# Patient Record
Sex: FEMALE | Race: WHITE | NOT HISPANIC OR LATINO | Employment: FULL TIME | RURAL
[De-identification: names, ages, dates, MRNs, and addresses within clinical notes are randomized per-mention and may not be internally consistent; named-entity substitution may affect disease eponyms.]

---

## 2016-12-19 LAB — CRC RECOMMENDATION EXT: NORMAL

## 2020-04-06 ENCOUNTER — HISTORICAL (OUTPATIENT)
Dept: ADMINISTRATIVE | Facility: HOSPITAL | Age: 55
End: 2020-04-06

## 2020-07-30 ENCOUNTER — HISTORICAL (OUTPATIENT)
Dept: ADMINISTRATIVE | Facility: HOSPITAL | Age: 55
End: 2020-07-30

## 2021-03-30 ENCOUNTER — HOSPITAL ENCOUNTER (OUTPATIENT)
Dept: RADIOLOGY | Facility: HOSPITAL | Age: 56
Discharge: HOME | End: 2021-03-30
Attending: NURSE PRACTITIONER
Payer: COMMERCIAL

## 2021-03-30 DIAGNOSIS — Q76.7 OTHER CONGENITAL ANOMALY OF RIBS AND STERNUM: ICD-10-CM

## 2021-03-30 DIAGNOSIS — Q76.6 OTHER CONGENITAL ANOMALY OF RIBS AND STERNUM: ICD-10-CM

## 2021-03-30 DIAGNOSIS — R07.81 PLEURITIC CHEST PAIN: ICD-10-CM

## 2021-03-30 PROCEDURE — 25500020 PHARM REV CODE 255: Performed by: NURSE PRACTITIONER

## 2021-03-30 PROCEDURE — 71260 CT CHEST WITH CONTRAST: ICD-10-PCS | Mod: 26,,, | Performed by: RADIOLOGY

## 2021-03-30 PROCEDURE — 71260 CT THORAX DX C+: CPT | Mod: 26,,, | Performed by: RADIOLOGY

## 2021-03-30 PROCEDURE — 71260 CT THORAX DX C+: CPT | Mod: TC

## 2021-03-30 RX ADMIN — IOPAMIDOL 100 ML: 755 INJECTION, SOLUTION INTRAVENOUS at 01:03

## 2021-04-02 ENCOUNTER — TELEPHONE (OUTPATIENT)
Dept: FAMILY MEDICINE | Facility: CLINIC | Age: 56
End: 2021-04-02

## 2021-04-06 ENCOUNTER — TELEPHONE (OUTPATIENT)
Dept: FAMILY MEDICINE | Facility: CLINIC | Age: 56
End: 2021-04-06

## 2021-04-11 ENCOUNTER — OFFICE VISIT (OUTPATIENT)
Dept: FAMILY MEDICINE | Facility: CLINIC | Age: 56
End: 2021-04-11
Payer: COMMERCIAL

## 2021-04-11 VITALS
DIASTOLIC BLOOD PRESSURE: 90 MMHG | HEIGHT: 60 IN | SYSTOLIC BLOOD PRESSURE: 150 MMHG | OXYGEN SATURATION: 97 % | HEART RATE: 88 BPM | BODY MASS INDEX: 29.06 KG/M2 | TEMPERATURE: 99 F | WEIGHT: 148 LBS

## 2021-04-11 DIAGNOSIS — J45.909 ASTHMA, UNSPECIFIED ASTHMA SEVERITY, UNSPECIFIED WHETHER COMPLICATED, UNSPECIFIED WHETHER PERSISTENT: Primary | ICD-10-CM

## 2021-04-11 DIAGNOSIS — R06.2 WHEEZING: ICD-10-CM

## 2021-04-11 DIAGNOSIS — J01.00 ACUTE NON-RECURRENT MAXILLARY SINUSITIS: ICD-10-CM

## 2021-04-11 PROCEDURE — 99214 OFFICE O/P EST MOD 30 MIN: CPT | Mod: ,,, | Performed by: NURSE PRACTITIONER

## 2021-04-11 PROCEDURE — 99051 PR MEDICAL SERVICES, EVE/WKEND/HOLIDAY: ICD-10-PCS | Mod: ,,, | Performed by: NURSE PRACTITIONER

## 2021-04-11 PROCEDURE — 99214 PR OFFICE/OUTPT VISIT, EST, LEVL IV, 30-39 MIN: ICD-10-PCS | Mod: ,,, | Performed by: NURSE PRACTITIONER

## 2021-04-11 PROCEDURE — 99051 MED SERV EVE/WKEND/HOLIDAY: CPT | Mod: ,,, | Performed by: NURSE PRACTITIONER

## 2021-04-11 RX ORDER — LEVOTHYROXINE SODIUM 75 UG/1
75 TABLET ORAL DAILY
COMMUNITY
Start: 2021-04-05 | End: 2021-04-21 | Stop reason: SDUPTHER

## 2021-04-11 RX ORDER — AZITHROMYCIN 250 MG/1
TABLET, FILM COATED ORAL
Qty: 6 TABLET | Refills: 0 | Status: SHIPPED | OUTPATIENT
Start: 2021-04-11 | End: 2021-04-21 | Stop reason: ALTCHOICE

## 2021-04-11 RX ORDER — ALBUTEROL SULFATE 90 UG/1
2 AEROSOL, METERED RESPIRATORY (INHALATION) EVERY 6 HOURS PRN
Qty: 18 G | Refills: 1 | Status: SHIPPED | OUTPATIENT
Start: 2021-04-11 | End: 2021-04-21 | Stop reason: SDUPTHER

## 2021-04-11 RX ORDER — MONTELUKAST SODIUM 10 MG/1
10 TABLET ORAL NIGHTLY
COMMUNITY
End: 2021-04-21 | Stop reason: SDUPTHER

## 2021-04-11 RX ORDER — ERGOCALCIFEROL 1.25 MG/1
50000 CAPSULE ORAL
COMMUNITY
Start: 2021-03-06 | End: 2021-04-21 | Stop reason: SDUPTHER

## 2021-04-11 RX ORDER — HYDROCODONE BITARTRATE AND ACETAMINOPHEN 7.5; 325 MG/1; MG/1
TABLET ORAL
COMMUNITY
Start: 2021-04-07 | End: 2024-03-04

## 2021-04-11 RX ORDER — ALBUTEROL SULFATE 90 UG/1
180 INHALANT RESPIRATORY (INHALATION)
COMMUNITY
End: 2021-04-11

## 2021-04-11 RX ORDER — PREDNISONE 20 MG/1
40 TABLET ORAL DAILY
Qty: 10 TABLET | Refills: 0 | Status: SHIPPED | OUTPATIENT
Start: 2021-04-11 | End: 2021-04-16

## 2021-04-21 ENCOUNTER — OFFICE VISIT (OUTPATIENT)
Dept: FAMILY MEDICINE | Facility: CLINIC | Age: 56
End: 2021-04-21
Payer: COMMERCIAL

## 2021-04-21 VITALS
DIASTOLIC BLOOD PRESSURE: 64 MMHG | BODY MASS INDEX: 28.32 KG/M2 | WEIGHT: 150 LBS | HEIGHT: 61 IN | SYSTOLIC BLOOD PRESSURE: 112 MMHG | HEART RATE: 76 BPM | OXYGEN SATURATION: 99 % | TEMPERATURE: 98 F

## 2021-04-21 DIAGNOSIS — M81.0 OSTEOPOROSIS, UNSPECIFIED OSTEOPOROSIS TYPE, UNSPECIFIED PATHOLOGICAL FRACTURE PRESENCE: ICD-10-CM

## 2021-04-21 DIAGNOSIS — R06.2 WHEEZING: ICD-10-CM

## 2021-04-21 DIAGNOSIS — E03.9 HYPOTHYROIDISM, UNSPECIFIED TYPE: Primary | ICD-10-CM

## 2021-04-21 DIAGNOSIS — E55.9 VITAMIN D DEFICIENCY: ICD-10-CM

## 2021-04-21 DIAGNOSIS — J45.909 ASTHMA, UNSPECIFIED ASTHMA SEVERITY, UNSPECIFIED WHETHER COMPLICATED, UNSPECIFIED WHETHER PERSISTENT: ICD-10-CM

## 2021-04-21 DIAGNOSIS — M19.90 ARTHRITIS: ICD-10-CM

## 2021-04-21 LAB
25(OH)D3 SERPL-MCNC: 77 NG/ML
ALBUMIN SERPL BCP-MCNC: 4 G/DL (ref 3.5–5)
ALBUMIN/GLOB SERPL: 1.4 {RATIO}
ALP SERPL-CCNC: 69 U/L (ref 46–118)
ALT SERPL W P-5'-P-CCNC: 21 U/L (ref 13–56)
ANION GAP SERPL CALCULATED.3IONS-SCNC: 8 MMOL/L (ref 7–16)
AST SERPL W P-5'-P-CCNC: 13 U/L (ref 15–37)
BILIRUB SERPL-MCNC: 0.4 MG/DL (ref 0–1.2)
BUN SERPL-MCNC: 9 MG/DL (ref 7–18)
BUN/CREAT SERPL: 16 (ref 6–20)
CALCIUM SERPL-MCNC: 8.7 MG/DL (ref 8.5–10.1)
CHLORIDE SERPL-SCNC: 110 MMOL/L (ref 98–107)
CO2 SERPL-SCNC: 30 MMOL/L (ref 21–32)
CREAT SERPL-MCNC: 0.58 MG/DL (ref 0.55–1.02)
GLOBULIN SER-MCNC: 2.9 G/DL (ref 2–4)
GLUCOSE SERPL-MCNC: 77 MG/DL (ref 74–106)
POTASSIUM SERPL-SCNC: 4 MMOL/L (ref 3.5–5.1)
PROT SERPL-MCNC: 6.9 G/DL (ref 6.4–8.2)
SODIUM SERPL-SCNC: 144 MMOL/L (ref 136–145)
TSH SERPL DL<=0.005 MIU/L-ACNC: 0.69 UIU/ML (ref 0.36–3.74)

## 2021-04-21 PROCEDURE — 80053 COMPREHENSIVE METABOLIC PANEL: ICD-10-PCS | Mod: QW,,, | Performed by: CLINICAL MEDICAL LABORATORY

## 2021-04-21 PROCEDURE — 99214 OFFICE O/P EST MOD 30 MIN: CPT | Mod: ,,, | Performed by: NURSE PRACTITIONER

## 2021-04-21 PROCEDURE — 84443 ASSAY THYROID STIM HORMONE: CPT | Mod: QW,,, | Performed by: CLINICAL MEDICAL LABORATORY

## 2021-04-21 PROCEDURE — 84443 TSH: ICD-10-PCS | Mod: QW,,, | Performed by: CLINICAL MEDICAL LABORATORY

## 2021-04-21 PROCEDURE — 82306 VITAMIN D: ICD-10-PCS | Mod: ,,, | Performed by: CLINICAL MEDICAL LABORATORY

## 2021-04-21 PROCEDURE — 99214 PR OFFICE/OUTPT VISIT, EST, LEVL IV, 30-39 MIN: ICD-10-PCS | Mod: ,,, | Performed by: NURSE PRACTITIONER

## 2021-04-21 PROCEDURE — 80053 COMPREHEN METABOLIC PANEL: CPT | Mod: QW,,, | Performed by: CLINICAL MEDICAL LABORATORY

## 2021-04-21 PROCEDURE — 82306 VITAMIN D 25 HYDROXY: CPT | Mod: ,,, | Performed by: CLINICAL MEDICAL LABORATORY

## 2021-04-21 RX ORDER — MELOXICAM 7.5 MG/1
7.5 TABLET ORAL
COMMUNITY
End: 2022-09-14 | Stop reason: ALTCHOICE

## 2021-04-21 RX ORDER — ERGOCALCIFEROL 1.25 MG/1
50000 CAPSULE ORAL
Qty: 8 CAPSULE | Refills: 5 | Status: SHIPPED | OUTPATIENT
Start: 2021-04-22 | End: 2023-10-02

## 2021-04-21 RX ORDER — MONTELUKAST SODIUM 10 MG/1
10 TABLET ORAL NIGHTLY
Qty: 30 TABLET | Refills: 5 | Status: SHIPPED | OUTPATIENT
Start: 2021-04-21 | End: 2022-09-14 | Stop reason: ALTCHOICE

## 2021-04-21 RX ORDER — ALBUTEROL SULFATE 90 UG/1
2 AEROSOL, METERED RESPIRATORY (INHALATION) EVERY 6 HOURS PRN
Qty: 18 G | Refills: 5 | Status: SHIPPED | OUTPATIENT
Start: 2021-04-21 | End: 2024-03-25 | Stop reason: SDUPTHER

## 2021-04-21 RX ORDER — IBANDRONATE SODIUM 150 MG/1
150 TABLET, FILM COATED ORAL
Qty: 1 TABLET | Refills: 5 | Status: SHIPPED | OUTPATIENT
Start: 2021-04-21 | End: 2022-09-14 | Stop reason: ALTCHOICE

## 2021-04-21 RX ORDER — LEVOTHYROXINE SODIUM 75 UG/1
75 TABLET ORAL DAILY
Qty: 30 TABLET | Refills: 5 | Status: SHIPPED | OUTPATIENT
Start: 2021-04-21 | End: 2021-11-15 | Stop reason: SDUPTHER

## 2021-04-26 ENCOUNTER — TELEPHONE (OUTPATIENT)
Dept: FAMILY MEDICINE | Facility: CLINIC | Age: 56
End: 2021-04-26

## 2021-04-27 ENCOUNTER — TELEPHONE (OUTPATIENT)
Dept: FAMILY MEDICINE | Facility: CLINIC | Age: 56
End: 2021-04-27

## 2021-04-27 DIAGNOSIS — M81.0 OSTEOPOROSIS, UNSPECIFIED OSTEOPOROSIS TYPE, UNSPECIFIED PATHOLOGICAL FRACTURE PRESENCE: ICD-10-CM

## 2021-04-27 DIAGNOSIS — E55.9 VITAMIN D DEFICIENCY: ICD-10-CM

## 2021-04-29 RX ORDER — ERGOCALCIFEROL 1.25 MG/1
50000 CAPSULE ORAL
Qty: 8 CAPSULE | Refills: 5 | OUTPATIENT
Start: 2021-04-29

## 2021-06-01 ENCOUNTER — OFFICE VISIT (OUTPATIENT)
Dept: OBSTETRICS AND GYNECOLOGY | Facility: CLINIC | Age: 56
End: 2021-06-01
Payer: COMMERCIAL

## 2021-06-01 VITALS
WEIGHT: 150.81 LBS | BODY MASS INDEX: 28.47 KG/M2 | DIASTOLIC BLOOD PRESSURE: 42 MMHG | SYSTOLIC BLOOD PRESSURE: 85 MMHG | HEIGHT: 61 IN

## 2021-06-01 DIAGNOSIS — Z01.419 WOMEN'S ANNUAL ROUTINE GYNECOLOGICAL EXAMINATION: Primary | ICD-10-CM

## 2021-06-01 DIAGNOSIS — N95.1 SYMPTOMS, SUCH AS FLUSHING, SLEEPLESSNESS, HEADACHE, LACK OF CONCENTRATION, ASSOCIATED WITH THE MENOPAUSE: ICD-10-CM

## 2021-06-01 DIAGNOSIS — Z12.72 SPECIAL SCREENING FOR MALIGNANT NEOPLASMS, VAGINA: ICD-10-CM

## 2021-06-01 PROCEDURE — 88142 CYTOPATH C/V THIN LAYER: CPT | Mod: GCY | Performed by: OBSTETRICS & GYNECOLOGY

## 2021-06-01 PROCEDURE — 3008F PR BODY MASS INDEX (BMI) DOCUMENTED: ICD-10-PCS | Mod: ,,, | Performed by: OBSTETRICS & GYNECOLOGY

## 2021-06-01 PROCEDURE — 99213 OFFICE O/P EST LOW 20 MIN: CPT | Mod: PBBFAC | Performed by: OBSTETRICS & GYNECOLOGY

## 2021-06-01 PROCEDURE — 3008F BODY MASS INDEX DOCD: CPT | Mod: ,,, | Performed by: OBSTETRICS & GYNECOLOGY

## 2021-06-01 PROCEDURE — 99386 PREV VISIT NEW AGE 40-64: CPT | Mod: S$PBB,,, | Performed by: OBSTETRICS & GYNECOLOGY

## 2021-06-01 PROCEDURE — 99386 PR PREVENTIVE VISIT,NEW,40-64: ICD-10-PCS | Mod: S$PBB,,, | Performed by: OBSTETRICS & GYNECOLOGY

## 2021-06-03 LAB
GH SERPL-MCNC: NORMAL NG/ML
INSULIN SERPL-ACNC: NORMAL U[IU]/ML
LAB AP CLINICAL INFORMATION: NORMAL
LAB AP GYN INTERPRETATION: NEGATIVE
LAB AP PAP DISCLAIMER COMMENTS: NORMAL
RENIN PLAS-CCNC: NORMAL NG/ML/H

## 2021-06-17 ENCOUNTER — OFFICE VISIT (OUTPATIENT)
Dept: FAMILY MEDICINE | Facility: CLINIC | Age: 56
End: 2021-06-17
Payer: COMMERCIAL

## 2021-06-17 VITALS
TEMPERATURE: 99 F | DIASTOLIC BLOOD PRESSURE: 68 MMHG | HEART RATE: 98 BPM | SYSTOLIC BLOOD PRESSURE: 120 MMHG | HEIGHT: 61 IN | WEIGHT: 143 LBS | OXYGEN SATURATION: 98 % | BODY MASS INDEX: 27 KG/M2

## 2021-06-17 DIAGNOSIS — R07.0 PAIN IN THROAT: Primary | ICD-10-CM

## 2021-06-17 DIAGNOSIS — J00 ACUTE NASOPHARYNGITIS (COMMON COLD): ICD-10-CM

## 2021-06-17 LAB
CTP QC/QA: YES
S PYO RRNA THROAT QL PROBE: NEGATIVE

## 2021-06-17 PROCEDURE — 3008F BODY MASS INDEX DOCD: CPT | Mod: ,,, | Performed by: FAMILY MEDICINE

## 2021-06-17 PROCEDURE — 99213 PR OFFICE/OUTPT VISIT, EST, LEVL III, 20-29 MIN: ICD-10-PCS | Mod: ,,, | Performed by: FAMILY MEDICINE

## 2021-06-17 PROCEDURE — 3008F PR BODY MASS INDEX (BMI) DOCUMENTED: ICD-10-PCS | Mod: ,,, | Performed by: FAMILY MEDICINE

## 2021-06-17 PROCEDURE — 99213 OFFICE O/P EST LOW 20 MIN: CPT | Mod: ,,, | Performed by: FAMILY MEDICINE

## 2021-06-17 PROCEDURE — 87880 STREP A ASSAY W/OPTIC: CPT | Mod: QW,,, | Performed by: FAMILY MEDICINE

## 2021-06-17 PROCEDURE — 87880 POCT RAPID STREP A: ICD-10-PCS | Mod: QW,,, | Performed by: FAMILY MEDICINE

## 2021-06-17 RX ORDER — ESTRADIOL 1 MG/1
1 TABLET ORAL DAILY
COMMUNITY
Start: 2021-06-01 | End: 2022-09-14 | Stop reason: ALTCHOICE

## 2021-06-17 RX ORDER — PREDNISONE 20 MG/1
20 TABLET ORAL DAILY
Qty: 5 TABLET | Refills: 0 | Status: SHIPPED | OUTPATIENT
Start: 2021-06-17 | End: 2022-09-14 | Stop reason: ALTCHOICE

## 2021-06-17 RX ORDER — CETIRIZINE HYDROCHLORIDE AND PSEUDOEPHEDRINE HYDROCHLORIDE 5; 120 MG/1; MG/1
1 TABLET, FILM COATED, EXTENDED RELEASE ORAL 2 TIMES DAILY PRN
COMMUNITY
Start: 2020-12-27 | End: 2022-09-14 | Stop reason: ALTCHOICE

## 2021-06-17 RX ORDER — IPRATROPIUM BROMIDE 21 UG/1
2 SPRAY, METERED NASAL 3 TIMES DAILY
Qty: 30 ML | Refills: 0 | Status: SHIPPED | OUTPATIENT
Start: 2021-06-17 | End: 2022-12-27

## 2021-06-17 RX ORDER — TRAMADOL HYDROCHLORIDE 50 MG/1
50 TABLET ORAL NIGHTLY
COMMUNITY
Start: 2021-06-08

## 2021-06-17 RX ORDER — ONDANSETRON 4 MG/1
4 TABLET, FILM COATED ORAL EVERY 6 HOURS PRN
Qty: 30 TABLET | Refills: 0 | Status: SHIPPED | OUTPATIENT
Start: 2021-06-17 | End: 2022-06-16 | Stop reason: SDUPTHER

## 2021-06-17 RX ORDER — SUMATRIPTAN SUCCINATE 25 MG/1
TABLET ORAL
COMMUNITY
Start: 2020-08-17 | End: 2022-09-14 | Stop reason: ALTCHOICE

## 2021-06-17 RX ORDER — ERGOCALCIFEROL 1.25 MG/1
CAPSULE ORAL
COMMUNITY
Start: 2020-12-14 | End: 2021-11-15 | Stop reason: SDUPTHER

## 2021-06-17 RX ORDER — PSEUDOEPHEDRINE HCL 30 MG
TABLET ORAL
COMMUNITY
Start: 2021-01-22 | End: 2022-09-14 | Stop reason: ALTCHOICE

## 2021-06-21 ENCOUNTER — TELEPHONE (OUTPATIENT)
Dept: FAMILY MEDICINE | Facility: CLINIC | Age: 56
End: 2021-06-21

## 2021-06-21 RX ORDER — AZITHROMYCIN 250 MG/1
TABLET, FILM COATED ORAL
Qty: 6 TABLET | Refills: 0 | Status: SHIPPED | OUTPATIENT
Start: 2021-06-21 | End: 2021-06-26

## 2021-09-10 ENCOUNTER — OFFICE VISIT (OUTPATIENT)
Dept: FAMILY MEDICINE | Facility: CLINIC | Age: 56
End: 2021-09-10
Payer: COMMERCIAL

## 2021-09-10 VITALS
DIASTOLIC BLOOD PRESSURE: 76 MMHG | OXYGEN SATURATION: 98 % | HEART RATE: 83 BPM | TEMPERATURE: 98 F | WEIGHT: 149.5 LBS | HEIGHT: 61 IN | SYSTOLIC BLOOD PRESSURE: 124 MMHG | BODY MASS INDEX: 28.22 KG/M2

## 2021-09-10 DIAGNOSIS — G47.09 OTHER INSOMNIA: Primary | ICD-10-CM

## 2021-09-10 DIAGNOSIS — M79.672 LEFT FOOT PAIN: ICD-10-CM

## 2021-09-10 PROCEDURE — 1160F PR REVIEW ALL MEDS BY PRESCRIBER/CLIN PHARMACIST DOCUMENTED: ICD-10-PCS | Mod: ,,, | Performed by: FAMILY MEDICINE

## 2021-09-10 PROCEDURE — 3008F PR BODY MASS INDEX (BMI) DOCUMENTED: ICD-10-PCS | Mod: ,,, | Performed by: FAMILY MEDICINE

## 2021-09-10 PROCEDURE — 3044F PR MOST RECENT HEMOGLOBIN A1C LEVEL <7.0%: ICD-10-PCS | Mod: ,,, | Performed by: FAMILY MEDICINE

## 2021-09-10 PROCEDURE — 3044F HG A1C LEVEL LT 7.0%: CPT | Mod: ,,, | Performed by: FAMILY MEDICINE

## 2021-09-10 PROCEDURE — 1159F MED LIST DOCD IN RCRD: CPT | Mod: ,,, | Performed by: FAMILY MEDICINE

## 2021-09-10 PROCEDURE — 99214 PR OFFICE/OUTPT VISIT, EST, LEVL IV, 30-39 MIN: ICD-10-PCS | Mod: ,,, | Performed by: FAMILY MEDICINE

## 2021-09-10 PROCEDURE — 3078F DIAST BP <80 MM HG: CPT | Mod: ,,, | Performed by: FAMILY MEDICINE

## 2021-09-10 PROCEDURE — 1159F PR MEDICATION LIST DOCUMENTED IN MEDICAL RECORD: ICD-10-PCS | Mod: ,,, | Performed by: FAMILY MEDICINE

## 2021-09-10 PROCEDURE — 99214 OFFICE O/P EST MOD 30 MIN: CPT | Mod: ,,, | Performed by: FAMILY MEDICINE

## 2021-09-10 PROCEDURE — 3074F PR MOST RECENT SYSTOLIC BLOOD PRESSURE < 130 MM HG: ICD-10-PCS | Mod: ,,, | Performed by: FAMILY MEDICINE

## 2021-09-10 PROCEDURE — 3008F BODY MASS INDEX DOCD: CPT | Mod: ,,, | Performed by: FAMILY MEDICINE

## 2021-09-10 PROCEDURE — 1160F RVW MEDS BY RX/DR IN RCRD: CPT | Mod: ,,, | Performed by: FAMILY MEDICINE

## 2021-09-10 PROCEDURE — 3078F PR MOST RECENT DIASTOLIC BLOOD PRESSURE < 80 MM HG: ICD-10-PCS | Mod: ,,, | Performed by: FAMILY MEDICINE

## 2021-09-10 PROCEDURE — 3074F SYST BP LT 130 MM HG: CPT | Mod: ,,, | Performed by: FAMILY MEDICINE

## 2021-09-11 RX ORDER — TRAZODONE HYDROCHLORIDE 50 MG/1
TABLET ORAL
Qty: 60 TABLET | Refills: 2 | Status: SHIPPED | OUTPATIENT
Start: 2021-09-11 | End: 2022-09-14 | Stop reason: ALTCHOICE

## 2021-09-13 ENCOUNTER — TELEPHONE (OUTPATIENT)
Dept: FAMILY MEDICINE | Facility: CLINIC | Age: 56
End: 2021-09-13

## 2021-10-19 ENCOUNTER — PATIENT OUTREACH (OUTPATIENT)
Dept: FAMILY MEDICINE | Facility: CLINIC | Age: 56
End: 2021-10-19

## 2021-11-15 ENCOUNTER — OFFICE VISIT (OUTPATIENT)
Dept: FAMILY MEDICINE | Facility: CLINIC | Age: 56
End: 2021-11-15
Payer: COMMERCIAL

## 2021-11-15 VITALS
BODY MASS INDEX: 27.47 KG/M2 | HEIGHT: 61 IN | SYSTOLIC BLOOD PRESSURE: 120 MMHG | DIASTOLIC BLOOD PRESSURE: 80 MMHG | TEMPERATURE: 98 F | OXYGEN SATURATION: 97 % | HEART RATE: 80 BPM | WEIGHT: 145.5 LBS

## 2021-11-15 DIAGNOSIS — Z01.818 PREOPERATIVE CLEARANCE: Primary | ICD-10-CM

## 2021-11-15 DIAGNOSIS — E03.9 HYPOTHYROIDISM, UNSPECIFIED TYPE: ICD-10-CM

## 2021-11-15 DIAGNOSIS — Z01.810 PREOP CARDIOVASCULAR EXAM: ICD-10-CM

## 2021-11-15 DIAGNOSIS — Z01.812 PRE-OPERATIVE LABORATORY EXAMINATION: ICD-10-CM

## 2021-11-15 LAB
ANION GAP SERPL CALCULATED.3IONS-SCNC: 11 MMOL/L (ref 7–16)
BASOPHILS # BLD AUTO: 0.04 K/UL (ref 0–0.2)
BASOPHILS NFR BLD AUTO: 0.5 % (ref 0–1)
BUN SERPL-MCNC: 10 MG/DL (ref 7–18)
BUN/CREAT SERPL: 15 (ref 6–20)
CALCIUM SERPL-MCNC: 8.7 MG/DL (ref 8.5–10.1)
CHLORIDE SERPL-SCNC: 106 MMOL/L (ref 98–107)
CO2 SERPL-SCNC: 29 MMOL/L (ref 21–32)
CREAT SERPL-MCNC: 0.68 MG/DL (ref 0.55–1.02)
DIFFERENTIAL METHOD BLD: ABNORMAL
EOSINOPHIL # BLD AUTO: 0.28 K/UL (ref 0–0.5)
EOSINOPHIL NFR BLD AUTO: 3.7 % (ref 1–4)
ERYTHROCYTE [DISTWIDTH] IN BLOOD BY AUTOMATED COUNT: 12.8 % (ref 11.5–14.5)
GLUCOSE SERPL-MCNC: 80 MG/DL (ref 74–106)
HCT VFR BLD AUTO: 40.1 % (ref 38–47)
HGB BLD-MCNC: 13.2 G/DL (ref 12–16)
IMM GRANULOCYTES # BLD AUTO: 0.01 K/UL (ref 0–0.04)
IMM GRANULOCYTES NFR BLD: 0.1 % (ref 0–0.4)
LYMPHOCYTES # BLD AUTO: 2.9 K/UL (ref 1–4.8)
LYMPHOCYTES NFR BLD AUTO: 38.5 % (ref 27–41)
MCH RBC QN AUTO: 28.3 PG (ref 27–31)
MCHC RBC AUTO-ENTMCNC: 32.9 G/DL (ref 32–36)
MCV RBC AUTO: 86.1 FL (ref 80–96)
MONOCYTES # BLD AUTO: 0.62 K/UL (ref 0–0.8)
MONOCYTES NFR BLD AUTO: 8.2 % (ref 2–6)
MPC BLD CALC-MCNC: 10.6 FL (ref 9.4–12.4)
NEUTROPHILS # BLD AUTO: 3.69 K/UL (ref 1.8–7.7)
NEUTROPHILS NFR BLD AUTO: 49 % (ref 53–65)
NRBC # BLD AUTO: 0 X10E3/UL
NRBC, AUTO (.00): 0 %
PLATELET # BLD AUTO: 267 K/UL (ref 150–400)
POTASSIUM SERPL-SCNC: 4 MMOL/L (ref 3.5–5.1)
RBC # BLD AUTO: 4.66 M/UL (ref 4.2–5.4)
SODIUM SERPL-SCNC: 142 MMOL/L (ref 136–145)
TSH SERPL DL<=0.005 MIU/L-ACNC: 2.05 UIU/ML (ref 0.36–3.74)
WBC # BLD AUTO: 7.54 K/UL (ref 4.5–11)

## 2021-11-15 PROCEDURE — 3074F SYST BP LT 130 MM HG: CPT | Mod: ,,, | Performed by: FAMILY MEDICINE

## 2021-11-15 PROCEDURE — 3008F PR BODY MASS INDEX (BMI) DOCUMENTED: ICD-10-PCS | Mod: ,,, | Performed by: FAMILY MEDICINE

## 2021-11-15 PROCEDURE — 1159F MED LIST DOCD IN RCRD: CPT | Mod: ,,, | Performed by: FAMILY MEDICINE

## 2021-11-15 PROCEDURE — 3079F DIAST BP 80-89 MM HG: CPT | Mod: ,,, | Performed by: FAMILY MEDICINE

## 2021-11-15 PROCEDURE — 80048 BASIC METABOLIC PANEL: ICD-10-PCS | Mod: ,,, | Performed by: CLINICAL MEDICAL LABORATORY

## 2021-11-15 PROCEDURE — 99213 OFFICE O/P EST LOW 20 MIN: CPT | Mod: ,,, | Performed by: FAMILY MEDICINE

## 2021-11-15 PROCEDURE — 1159F PR MEDICATION LIST DOCUMENTED IN MEDICAL RECORD: ICD-10-PCS | Mod: ,,, | Performed by: FAMILY MEDICINE

## 2021-11-15 PROCEDURE — 84443 TSH: ICD-10-PCS | Mod: ,,, | Performed by: CLINICAL MEDICAL LABORATORY

## 2021-11-15 PROCEDURE — 3074F PR MOST RECENT SYSTOLIC BLOOD PRESSURE < 130 MM HG: ICD-10-PCS | Mod: ,,, | Performed by: FAMILY MEDICINE

## 2021-11-15 PROCEDURE — 3044F HG A1C LEVEL LT 7.0%: CPT | Mod: ,,, | Performed by: FAMILY MEDICINE

## 2021-11-15 PROCEDURE — 99213 PR OFFICE/OUTPT VISIT, EST, LEVL III, 20-29 MIN: ICD-10-PCS | Mod: ,,, | Performed by: FAMILY MEDICINE

## 2021-11-15 PROCEDURE — 1160F RVW MEDS BY RX/DR IN RCRD: CPT | Mod: ,,, | Performed by: FAMILY MEDICINE

## 2021-11-15 PROCEDURE — 93000 PR ELECTROCARDIOGRAM, COMPLETE: ICD-10-PCS | Mod: ,,, | Performed by: FAMILY MEDICINE

## 2021-11-15 PROCEDURE — 3008F BODY MASS INDEX DOCD: CPT | Mod: ,,, | Performed by: FAMILY MEDICINE

## 2021-11-15 PROCEDURE — 84443 ASSAY THYROID STIM HORMONE: CPT | Mod: ,,, | Performed by: CLINICAL MEDICAL LABORATORY

## 2021-11-15 PROCEDURE — 3079F PR MOST RECENT DIASTOLIC BLOOD PRESSURE 80-89 MM HG: ICD-10-PCS | Mod: ,,, | Performed by: FAMILY MEDICINE

## 2021-11-15 PROCEDURE — 85025 CBC WITH DIFFERENTIAL: ICD-10-PCS | Mod: ,,, | Performed by: CLINICAL MEDICAL LABORATORY

## 2021-11-15 PROCEDURE — 3044F PR MOST RECENT HEMOGLOBIN A1C LEVEL <7.0%: ICD-10-PCS | Mod: ,,, | Performed by: FAMILY MEDICINE

## 2021-11-15 PROCEDURE — 85025 COMPLETE CBC W/AUTO DIFF WBC: CPT | Mod: ,,, | Performed by: CLINICAL MEDICAL LABORATORY

## 2021-11-15 PROCEDURE — 93000 ELECTROCARDIOGRAM COMPLETE: CPT | Mod: ,,, | Performed by: FAMILY MEDICINE

## 2021-11-15 PROCEDURE — 80048 BASIC METABOLIC PNL TOTAL CA: CPT | Mod: ,,, | Performed by: CLINICAL MEDICAL LABORATORY

## 2021-11-15 PROCEDURE — 1160F PR REVIEW ALL MEDS BY PRESCRIBER/CLIN PHARMACIST DOCUMENTED: ICD-10-PCS | Mod: ,,, | Performed by: FAMILY MEDICINE

## 2021-11-15 RX ORDER — LEVOTHYROXINE SODIUM 75 UG/1
75 TABLET ORAL DAILY
Qty: 30 TABLET | Refills: 5 | Status: SHIPPED | OUTPATIENT
Start: 2021-11-15 | End: 2022-06-16 | Stop reason: SDUPTHER

## 2021-11-15 RX ORDER — ERGOCALCIFEROL 1.25 MG/1
CAPSULE ORAL
Qty: 24 CAPSULE | Refills: 0 | Status: SHIPPED | OUTPATIENT
Start: 2021-11-15 | End: 2022-06-16 | Stop reason: SDUPTHER

## 2021-11-16 ENCOUNTER — TELEPHONE (OUTPATIENT)
Dept: FAMILY MEDICINE | Facility: CLINIC | Age: 56
End: 2021-11-16
Payer: COMMERCIAL

## 2021-12-06 ENCOUNTER — HOSPITAL ENCOUNTER (OUTPATIENT)
Dept: RADIOLOGY | Facility: HOSPITAL | Age: 56
Discharge: HOME | End: 2021-12-06
Attending: ORTHOPAEDIC SURGERY
Payer: COMMERCIAL

## 2021-12-06 DIAGNOSIS — M79.642 LEFT HAND PAIN: ICD-10-CM

## 2021-12-06 PROBLEM — M19.042 PRIMARY OSTEOARTHRITIS OF LEFT HAND: Status: ACTIVE | Noted: 2021-12-06

## 2021-12-06 PROCEDURE — 73130 X-RAY EXAM OF HAND: CPT | Mod: TC,LT

## 2021-12-20 ENCOUNTER — OFFICE VISIT (OUTPATIENT)
Dept: FAMILY MEDICINE | Facility: CLINIC | Age: 56
End: 2021-12-20
Payer: COMMERCIAL

## 2021-12-20 VITALS
SYSTOLIC BLOOD PRESSURE: 115 MMHG | OXYGEN SATURATION: 98 % | WEIGHT: 146 LBS | DIASTOLIC BLOOD PRESSURE: 55 MMHG | HEART RATE: 77 BPM | RESPIRATION RATE: 17 BRPM | BODY MASS INDEX: 27.56 KG/M2 | TEMPERATURE: 98 F | HEIGHT: 61 IN

## 2021-12-20 DIAGNOSIS — B02.9 HERPES ZOSTER WITHOUT COMPLICATION: Primary | ICD-10-CM

## 2021-12-20 PROBLEM — B02.8 HERPES ZOSTER WITH COMPLICATION: Status: ACTIVE | Noted: 2021-12-20

## 2021-12-20 PROCEDURE — 99213 PR OFFICE/OUTPT VISIT, EST, LEVL III, 20-29 MIN: ICD-10-PCS | Mod: ,,, | Performed by: NURSE PRACTITIONER

## 2021-12-20 PROCEDURE — 99213 OFFICE O/P EST LOW 20 MIN: CPT | Mod: ,,, | Performed by: NURSE PRACTITIONER

## 2021-12-20 RX ORDER — VALACYCLOVIR HYDROCHLORIDE 1 G/1
1000 TABLET, FILM COATED ORAL 3 TIMES DAILY
Qty: 21 TABLET | Refills: 0 | Status: SHIPPED | OUTPATIENT
Start: 2021-12-20 | End: 2022-09-14 | Stop reason: ALTCHOICE

## 2022-05-15 ENCOUNTER — OFFICE VISIT (OUTPATIENT)
Dept: FAMILY MEDICINE | Facility: CLINIC | Age: 57
End: 2022-05-15
Payer: COMMERCIAL

## 2022-05-15 VITALS
HEIGHT: 61 IN | HEART RATE: 87 BPM | TEMPERATURE: 99 F | OXYGEN SATURATION: 98 % | SYSTOLIC BLOOD PRESSURE: 119 MMHG | DIASTOLIC BLOOD PRESSURE: 82 MMHG | BODY MASS INDEX: 27.94 KG/M2 | WEIGHT: 148 LBS

## 2022-05-15 DIAGNOSIS — J32.9 SINUSITIS, UNSPECIFIED CHRONICITY, UNSPECIFIED LOCATION: Primary | ICD-10-CM

## 2022-05-15 DIAGNOSIS — J02.9 SORE THROAT: ICD-10-CM

## 2022-05-15 DIAGNOSIS — R05.9 COUGH: ICD-10-CM

## 2022-05-15 LAB
CTP QC/QA: YES
CTP QC/QA: YES
FLUAV AG NPH QL: NEGATIVE
FLUBV AG NPH QL: NEGATIVE
S PYO RRNA THROAT QL PROBE: NEGATIVE
SARS-COV-2 AG RESP QL IA.RAPID: NEGATIVE

## 2022-05-15 PROCEDURE — 87880 POCT RAPID STREP A: ICD-10-PCS | Mod: QW,,, | Performed by: FAMILY MEDICINE

## 2022-05-15 PROCEDURE — 1160F PR REVIEW ALL MEDS BY PRESCRIBER/CLIN PHARMACIST DOCUMENTED: ICD-10-PCS | Mod: ,,, | Performed by: FAMILY MEDICINE

## 2022-05-15 PROCEDURE — 3074F SYST BP LT 130 MM HG: CPT | Mod: ,,, | Performed by: FAMILY MEDICINE

## 2022-05-15 PROCEDURE — 96372 PR INJECTION,THERAP/PROPH/DIAG2ST, IM OR SUBCUT: ICD-10-PCS | Mod: ,,, | Performed by: FAMILY MEDICINE

## 2022-05-15 PROCEDURE — 3008F PR BODY MASS INDEX (BMI) DOCUMENTED: ICD-10-PCS | Mod: ,,, | Performed by: FAMILY MEDICINE

## 2022-05-15 PROCEDURE — 99214 OFFICE O/P EST MOD 30 MIN: CPT | Mod: 25,,, | Performed by: FAMILY MEDICINE

## 2022-05-15 PROCEDURE — 87428 POCT SARS-COV2 (COVID) WITH FLU ANTIGEN: ICD-10-PCS | Mod: QW,,, | Performed by: FAMILY MEDICINE

## 2022-05-15 PROCEDURE — 3079F DIAST BP 80-89 MM HG: CPT | Mod: ,,, | Performed by: FAMILY MEDICINE

## 2022-05-15 PROCEDURE — 87428 SARSCOV & INF VIR A&B AG IA: CPT | Mod: QW,,, | Performed by: FAMILY MEDICINE

## 2022-05-15 PROCEDURE — 99051 MED SERV EVE/WKEND/HOLIDAY: CPT | Mod: ,,, | Performed by: FAMILY MEDICINE

## 2022-05-15 PROCEDURE — 1160F RVW MEDS BY RX/DR IN RCRD: CPT | Mod: ,,, | Performed by: FAMILY MEDICINE

## 2022-05-15 PROCEDURE — 3074F PR MOST RECENT SYSTOLIC BLOOD PRESSURE < 130 MM HG: ICD-10-PCS | Mod: ,,, | Performed by: FAMILY MEDICINE

## 2022-05-15 PROCEDURE — 99051 PR MEDICAL SERVICES, EVE/WKEND/HOLIDAY: ICD-10-PCS | Mod: ,,, | Performed by: FAMILY MEDICINE

## 2022-05-15 PROCEDURE — 96372 THER/PROPH/DIAG INJ SC/IM: CPT | Mod: ,,, | Performed by: FAMILY MEDICINE

## 2022-05-15 PROCEDURE — 3008F BODY MASS INDEX DOCD: CPT | Mod: ,,, | Performed by: FAMILY MEDICINE

## 2022-05-15 PROCEDURE — 99214 PR OFFICE/OUTPT VISIT, EST, LEVL IV, 30-39 MIN: ICD-10-PCS | Mod: 25,,, | Performed by: FAMILY MEDICINE

## 2022-05-15 PROCEDURE — 1159F PR MEDICATION LIST DOCUMENTED IN MEDICAL RECORD: ICD-10-PCS | Mod: ,,, | Performed by: FAMILY MEDICINE

## 2022-05-15 PROCEDURE — 87880 STREP A ASSAY W/OPTIC: CPT | Mod: QW,,, | Performed by: FAMILY MEDICINE

## 2022-05-15 PROCEDURE — 3079F PR MOST RECENT DIASTOLIC BLOOD PRESSURE 80-89 MM HG: ICD-10-PCS | Mod: ,,, | Performed by: FAMILY MEDICINE

## 2022-05-15 PROCEDURE — 1159F MED LIST DOCD IN RCRD: CPT | Mod: ,,, | Performed by: FAMILY MEDICINE

## 2022-05-15 RX ORDER — AZITHROMYCIN 250 MG/1
TABLET, FILM COATED ORAL
Qty: 6 TABLET | Refills: 0 | Status: SHIPPED | OUTPATIENT
Start: 2022-05-15 | End: 2022-09-14 | Stop reason: ALTCHOICE

## 2022-05-15 RX ORDER — CEFTRIAXONE 1 G/1
1 INJECTION, POWDER, FOR SOLUTION INTRAMUSCULAR; INTRAVENOUS
Status: COMPLETED | OUTPATIENT
Start: 2022-05-15 | End: 2022-05-15

## 2022-05-15 RX ORDER — CYCLOSPORINE 0.5 MG/ML
EMULSION OPHTHALMIC
COMMUNITY
Start: 2022-03-28 | End: 2023-10-02

## 2022-05-15 RX ORDER — DEXAMETHASONE SODIUM PHOSPHATE 4 MG/ML
4 INJECTION, SOLUTION INTRA-ARTICULAR; INTRALESIONAL; INTRAMUSCULAR; INTRAVENOUS; SOFT TISSUE
Status: COMPLETED | OUTPATIENT
Start: 2022-05-15 | End: 2022-05-15

## 2022-05-15 RX ORDER — PREDNISONE 20 MG/1
20 TABLET ORAL DAILY
Qty: 5 TABLET | Refills: 0 | Status: SHIPPED | OUTPATIENT
Start: 2022-05-15 | End: 2022-05-20

## 2022-05-15 RX ORDER — PREDNISOLONE ACETATE 10 MG/ML
SUSPENSION/ DROPS OPHTHALMIC
COMMUNITY
Start: 2021-12-07 | End: 2023-10-02

## 2022-05-15 RX ORDER — PANTOPRAZOLE SODIUM 40 MG/1
40 TABLET, DELAYED RELEASE ORAL DAILY PRN
COMMUNITY
Start: 2022-04-27

## 2022-05-15 RX ADMIN — DEXAMETHASONE SODIUM PHOSPHATE 4 MG: 4 INJECTION, SOLUTION INTRA-ARTICULAR; INTRALESIONAL; INTRAMUSCULAR; INTRAVENOUS; SOFT TISSUE at 02:05

## 2022-05-15 RX ADMIN — CEFTRIAXONE 1 G: 1 INJECTION, POWDER, FOR SOLUTION INTRAMUSCULAR; INTRAVENOUS at 02:05

## 2022-05-15 NOTE — PROGRESS NOTES
Subjective:       Patient ID: Muna Newberry is a 56 y.o. female.    Chief Complaint: Cough (X 3 days), Sinus Problem (Congestion and drainage X 3 days), and Sore Throat (X 3 days)    Pt with cough, congestion, drainage, fatigue sinus pressure x 3 days.     Review of Systems   Constitutional: Positive for fatigue. Negative for activity change, appetite change, chills, diaphoresis, fever and unexpected weight change.   HENT: Positive for nasal congestion, postnasal drip, rhinorrhea, sinus pressure/congestion and sore throat. Negative for dental problem, drooling, ear discharge, ear pain, facial swelling, hearing loss, mouth sores, nosebleeds, sneezing, tinnitus, trouble swallowing, voice change and goiter.    Eyes: Negative for photophobia, discharge, itching and visual disturbance.   Respiratory: Positive for cough. Negative for apnea, choking, chest tightness, shortness of breath, wheezing and stridor.    Cardiovascular: Negative for chest pain, palpitations, leg swelling and claudication.   Gastrointestinal: Negative for abdominal distention, abdominal pain, anal bleeding, blood in stool, change in bowel habit, constipation, diarrhea, nausea, vomiting and change in bowel habit.   Endocrine: Negative for cold intolerance, heat intolerance, polydipsia, polyphagia and polyuria.   Genitourinary: Negative for bladder incontinence, decreased urine volume, difficulty urinating, dysuria, enuresis, flank pain, frequency, hematuria, nocturia, pelvic pain and urgency.   Musculoskeletal: Negative for arthralgias, back pain, gait problem, joint swelling, leg pain, myalgias, neck pain, neck stiffness and joint deformity.   Integumentary:  Negative for pallor, rash, wound, breast mass and breast tenderness.   Allergic/Immunologic: Negative for environmental allergies, food allergies and immunocompromised state.   Neurological: Negative for dizziness, vertigo, tremors, seizures, syncope, facial asymmetry, speech difficulty,  weakness, light-headedness, numbness, headaches, disturbances in coordination, memory loss and coordination difficulties.   Hematological: Negative for adenopathy. Does not bruise/bleed easily.   Psychiatric/Behavioral: Negative for agitation, behavioral problems, confusion, decreased concentration, dysphoric mood, hallucinations, self-injury, sleep disturbance and suicidal ideas. The patient is not nervous/anxious and is not hyperactive.    Breast: Negative for mass and tenderness        Objective:      Physical Exam  Vitals reviewed.   Constitutional:       Appearance: Normal appearance.   HENT:      Head: Normocephalic and atraumatic.      Right Ear: Tympanic membrane, ear canal and external ear normal.      Left Ear: Tympanic membrane, ear canal and external ear normal.      Nose: Congestion and rhinorrhea present.      Comments: Bilateral sinus pressure.      Mouth/Throat:      Mouth: Mucous membranes are moist.      Pharynx: Oropharynx is clear. Posterior oropharyngeal erythema present.   Eyes:      Extraocular Movements: Extraocular movements intact.      Conjunctiva/sclera: Conjunctivae normal.      Pupils: Pupils are equal, round, and reactive to light.   Cardiovascular:      Rate and Rhythm: Normal rate and regular rhythm.      Pulses: Normal pulses.      Heart sounds: Normal heart sounds.   Pulmonary:      Effort: Pulmonary effort is normal.      Breath sounds: Normal breath sounds.   Abdominal:      General: Bowel sounds are normal.      Palpations: Abdomen is soft.   Musculoskeletal:         General: Normal range of motion.      Cervical back: Normal range of motion and neck supple.   Skin:     General: Skin is warm and dry.   Neurological:      General: No focal deficit present.      Mental Status: She is alert. Mental status is at baseline.   Psychiatric:         Mood and Affect: Mood normal.         Behavior: Behavior normal.         Thought Content: Thought content normal.         Judgment: Judgment  normal.         Assessment:       1. Sinusitis, unspecified chronicity, unspecified location    2. Sore throat    3. Cough        Plan:     Sinusitis, unspecified chronicity, unspecified location  -     cefTRIAXone injection 1 g  -     dexamethasone injection 4 mg    Sore throat  -     POCT rapid strep A    Cough  -     POCT SARS-COV2 (COVID) with Flu Antigen    Other orders  -     predniSONE (DELTASONE) 20 MG tablet; Take 1 tablet (20 mg total) by mouth once daily. for 5 days  Dispense: 5 tablet; Refill: 0  -     azithromycin (Z-MAR) 250 MG tablet; Take 2 tablets by mouth on day 1; Take 1 tablet by mouth on days 2-5  Dispense: 6 tablet; Refill: 0     Covid, strep and flu negative.

## 2022-05-15 NOTE — LETTER
May 15, 2022      [facility]  [address] [place] [address]  Phone: [phone]  Fax: [phone]       Patient: Muna Newberry   YOB: 1965  Date of Visit: 05/15/2022    To Whom It May Concern:    Ant Newberry  was at [facility] on 05/15/2022. The patient may return to work/school on 05/16/2022 with no restrictions. This letter also pertains to this date  5/13/2022. If you have any questions or concerns, or if I can be of further assistance, please do not hesitate to contact me.    Sincerely,    Nataliya Cornell, CMA

## 2022-06-16 ENCOUNTER — OFFICE VISIT (OUTPATIENT)
Dept: FAMILY MEDICINE | Facility: CLINIC | Age: 57
End: 2022-06-16
Payer: COMMERCIAL

## 2022-06-16 VITALS
TEMPERATURE: 98 F | SYSTOLIC BLOOD PRESSURE: 110 MMHG | WEIGHT: 152 LBS | HEART RATE: 83 BPM | OXYGEN SATURATION: 98 % | BODY MASS INDEX: 28.7 KG/M2 | HEIGHT: 61 IN | DIASTOLIC BLOOD PRESSURE: 60 MMHG

## 2022-06-16 DIAGNOSIS — E03.9 HYPOTHYROIDISM, UNSPECIFIED TYPE: ICD-10-CM

## 2022-06-16 DIAGNOSIS — D50.8 OTHER IRON DEFICIENCY ANEMIA: Primary | ICD-10-CM

## 2022-06-16 DIAGNOSIS — E55.9 VITAMIN D DEFICIENCY: ICD-10-CM

## 2022-06-16 LAB
25(OH)D3 SERPL-MCNC: 42.7 NG/ML
ALBUMIN SERPL BCP-MCNC: 4 G/DL (ref 3.5–5)
ALBUMIN/GLOB SERPL: 1.5 {RATIO}
ALP SERPL-CCNC: 97 U/L (ref 46–118)
ALT SERPL W P-5'-P-CCNC: 27 U/L (ref 13–56)
ANION GAP SERPL CALCULATED.3IONS-SCNC: 11 MMOL/L (ref 7–16)
AST SERPL W P-5'-P-CCNC: 16 U/L (ref 15–37)
BASOPHILS # BLD AUTO: 0.03 K/UL (ref 0–0.2)
BASOPHILS NFR BLD AUTO: 0.5 % (ref 0–1)
BILIRUB SERPL-MCNC: 0.4 MG/DL (ref 0–1.2)
BUN SERPL-MCNC: 9 MG/DL (ref 7–18)
BUN/CREAT SERPL: 13 (ref 6–20)
CALCIUM SERPL-MCNC: 8.9 MG/DL (ref 8.5–10.1)
CHLORIDE SERPL-SCNC: 106 MMOL/L (ref 98–107)
CO2 SERPL-SCNC: 28 MMOL/L (ref 21–32)
CREAT SERPL-MCNC: 0.68 MG/DL (ref 0.55–1.02)
DIFFERENTIAL METHOD BLD: ABNORMAL
EOSINOPHIL # BLD AUTO: 0.25 K/UL (ref 0–0.5)
EOSINOPHIL NFR BLD AUTO: 3.8 % (ref 1–4)
ERYTHROCYTE [DISTWIDTH] IN BLOOD BY AUTOMATED COUNT: 12.7 % (ref 11.5–14.5)
FERRITIN SERPL-MCNC: 483 NG/ML (ref 8–252)
GLOBULIN SER-MCNC: 2.7 G/DL (ref 2–4)
GLUCOSE SERPL-MCNC: 79 MG/DL (ref 74–106)
HCT VFR BLD AUTO: 42.5 % (ref 38–47)
HGB BLD-MCNC: 14.1 G/DL (ref 12–16)
IMM GRANULOCYTES # BLD AUTO: 0.01 K/UL (ref 0–0.04)
IMM GRANULOCYTES NFR BLD: 0.2 % (ref 0–0.4)
IRON SATN MFR SERPL: 44 % (ref 14–50)
IRON SERPL-MCNC: 92 ΜG/DL (ref 50–170)
LYMPHOCYTES # BLD AUTO: 2.4 K/UL (ref 1–4.8)
LYMPHOCYTES NFR BLD AUTO: 36.4 % (ref 27–41)
MCH RBC QN AUTO: 29.9 PG (ref 27–31)
MCHC RBC AUTO-ENTMCNC: 33.2 G/DL (ref 32–36)
MCV RBC AUTO: 90 FL (ref 80–96)
MONOCYTES # BLD AUTO: 0.5 K/UL (ref 0–0.8)
MONOCYTES NFR BLD AUTO: 7.6 % (ref 2–6)
MPC BLD CALC-MCNC: 10.5 FL (ref 9.4–12.4)
NEUTROPHILS # BLD AUTO: 3.4 K/UL (ref 1.8–7.7)
NEUTROPHILS NFR BLD AUTO: 51.5 % (ref 53–65)
NRBC # BLD AUTO: 0 X10E3/UL
NRBC, AUTO (.00): 0 %
PLATELET # BLD AUTO: 277 K/UL (ref 150–400)
POTASSIUM SERPL-SCNC: 4.2 MMOL/L (ref 3.5–5.1)
PROT SERPL-MCNC: 6.7 G/DL (ref 6.4–8.2)
RBC # BLD AUTO: 4.72 M/UL (ref 4.2–5.4)
SODIUM SERPL-SCNC: 141 MMOL/L (ref 136–145)
TIBC SERPL-MCNC: 207 ΜG/DL (ref 250–450)
TSH SERPL DL<=0.005 MIU/L-ACNC: 1.64 UIU/ML (ref 0.36–3.74)
WBC # BLD AUTO: 6.59 K/UL (ref 4.5–11)

## 2022-06-16 PROCEDURE — 82306 VITAMIN D: ICD-10-PCS | Mod: ICN,,, | Performed by: CLINICAL MEDICAL LABORATORY

## 2022-06-16 PROCEDURE — 99396 PR PREVENTIVE VISIT,EST,40-64: ICD-10-PCS | Mod: ,,, | Performed by: FAMILY MEDICINE

## 2022-06-16 PROCEDURE — 83550 IRON AND TIBC: ICD-10-PCS | Mod: ICN,,, | Performed by: CLINICAL MEDICAL LABORATORY

## 2022-06-16 PROCEDURE — 3008F PR BODY MASS INDEX (BMI) DOCUMENTED: ICD-10-PCS | Mod: ,,, | Performed by: FAMILY MEDICINE

## 2022-06-16 PROCEDURE — 1160F RVW MEDS BY RX/DR IN RCRD: CPT | Mod: ,,, | Performed by: FAMILY MEDICINE

## 2022-06-16 PROCEDURE — 83550 IRON BINDING TEST: CPT | Mod: ICN,,, | Performed by: CLINICAL MEDICAL LABORATORY

## 2022-06-16 PROCEDURE — 1159F MED LIST DOCD IN RCRD: CPT | Mod: ,,, | Performed by: FAMILY MEDICINE

## 2022-06-16 PROCEDURE — 3074F PR MOST RECENT SYSTOLIC BLOOD PRESSURE < 130 MM HG: ICD-10-PCS | Mod: ,,, | Performed by: FAMILY MEDICINE

## 2022-06-16 PROCEDURE — 83540 IRON AND TIBC: ICD-10-PCS | Mod: ICN,,, | Performed by: CLINICAL MEDICAL LABORATORY

## 2022-06-16 PROCEDURE — 1159F PR MEDICATION LIST DOCUMENTED IN MEDICAL RECORD: ICD-10-PCS | Mod: ,,, | Performed by: FAMILY MEDICINE

## 2022-06-16 PROCEDURE — 99396 PREV VISIT EST AGE 40-64: CPT | Mod: ,,, | Performed by: FAMILY MEDICINE

## 2022-06-16 PROCEDURE — 1160F PR REVIEW ALL MEDS BY PRESCRIBER/CLIN PHARMACIST DOCUMENTED: ICD-10-PCS | Mod: ,,, | Performed by: FAMILY MEDICINE

## 2022-06-16 PROCEDURE — 80050 GENERAL HEALTH PANEL: CPT | Mod: ICN,,, | Performed by: CLINICAL MEDICAL LABORATORY

## 2022-06-16 PROCEDURE — 3008F BODY MASS INDEX DOCD: CPT | Mod: ,,, | Performed by: FAMILY MEDICINE

## 2022-06-16 PROCEDURE — 3078F PR MOST RECENT DIASTOLIC BLOOD PRESSURE < 80 MM HG: ICD-10-PCS | Mod: ,,, | Performed by: FAMILY MEDICINE

## 2022-06-16 PROCEDURE — 3074F SYST BP LT 130 MM HG: CPT | Mod: ,,, | Performed by: FAMILY MEDICINE

## 2022-06-16 PROCEDURE — 80050 PR  GENERAL HEALTH PANEL: ICD-10-PCS | Mod: ICN,,, | Performed by: CLINICAL MEDICAL LABORATORY

## 2022-06-16 PROCEDURE — 82306 VITAMIN D 25 HYDROXY: CPT | Mod: ICN,,, | Performed by: CLINICAL MEDICAL LABORATORY

## 2022-06-16 PROCEDURE — 82728 ASSAY OF FERRITIN: CPT | Mod: ICN,,, | Performed by: CLINICAL MEDICAL LABORATORY

## 2022-06-16 PROCEDURE — 82728 FERRITIN: ICD-10-PCS | Mod: ICN,,, | Performed by: CLINICAL MEDICAL LABORATORY

## 2022-06-16 PROCEDURE — 3078F DIAST BP <80 MM HG: CPT | Mod: ,,, | Performed by: FAMILY MEDICINE

## 2022-06-16 PROCEDURE — 83540 ASSAY OF IRON: CPT | Mod: ICN,,, | Performed by: CLINICAL MEDICAL LABORATORY

## 2022-06-16 RX ORDER — ERGOCALCIFEROL 1.25 MG/1
CAPSULE ORAL
Qty: 24 CAPSULE | Refills: 0 | Status: SHIPPED | OUTPATIENT
Start: 2022-06-16 | End: 2022-12-27 | Stop reason: SDUPTHER

## 2022-06-16 RX ORDER — ONDANSETRON 4 MG/1
4 TABLET, FILM COATED ORAL EVERY 6 HOURS PRN
Qty: 30 TABLET | Refills: 0 | Status: SHIPPED | OUTPATIENT
Start: 2022-06-16 | End: 2022-09-14 | Stop reason: ALTCHOICE

## 2022-06-16 RX ORDER — LEVOTHYROXINE SODIUM 75 UG/1
75 TABLET ORAL DAILY
Qty: 30 TABLET | Refills: 5 | Status: SHIPPED | OUTPATIENT
Start: 2022-06-16 | End: 2023-03-20 | Stop reason: SDUPTHER

## 2022-06-17 NOTE — PROGRESS NOTES
[facility]    Chief Complaint    Follow up      History of Present Illness      Muna Newberry is a 56 y.o. female with chronic conditions of asthma, hypothyroidism, migraines, iron def anemia and osteoarthritis who presents today for routine follow up. Pt states she has been seeing Dr. King (heme/onc) for iron def anemia. Was felt her chronic leg pain/myalgia were related to low ferritin. Tried PO iron supplement but had too many GI side effects. Underwent iron infusion in April. Leg cramps improved for a month or two, but she's noted return of symptoms over the last few weeks. States other chronic conditions stable. Tolerating medications w/o adverse side effects.    Past Medical History:  Past Medical History:   Diagnosis Date    Idiopathic thrombotic thrombocytopenic purpura     Osteoarthrosis     Osteoporosis        Past Surgical History:   has a past surgical history that includes Tonsillectomy; Cholecystectomy; Hysterectomy; Appendectomy; and Oophorectomy.    Social History:  Social History     Tobacco Use    Smoking status: Never Smoker    Smokeless tobacco: Never Used   Substance Use Topics    Alcohol use: Never    Drug use: Never       I personally reviewed all past medical, surgical, and social.     Review of Systems   Constitutional: Negative for fatigue and fever.   HENT: Negative for ear pain.    Eyes: Negative for pain and visual disturbance.   Respiratory: Negative for chest tightness and shortness of breath.    Cardiovascular: Negative for chest pain and leg swelling.   Gastrointestinal: Negative for abdominal pain.   Genitourinary: Negative for difficulty urinating.   Musculoskeletal: Positive for myalgias. Negative for gait problem.   Skin: Negative for rash.   Neurological: Negative for dizziness and light-headedness.   Hematological: Does not bruise/bleed easily.        Medications:  Outpatient Encounter Medications as of 6/16/2022   Medication Sig Dispense Refill     albuterol (VENTOLIN HFA) 90 mcg/actuation inhaler Inhale 2 puffs into the lungs every 6 (six) hours as needed for Wheezing. Rescue 18 g 5    azithromycin (Z-MAR) 250 MG tablet Take 2 tablets by mouth on day 1; Take 1 tablet by mouth on days 2-5 6 tablet 0    ergocalciferol (ERGOCALCIFEROL) 50,000 unit Cap Take 1 capsule (50,000 Units total) by mouth twice a week. 8 capsule 5    ergocalciferol (VITAMIN D2) 50,000 unit Cap Vitamin D (Ergocalciferol) 1.25 MG (52892 UT) Oral Capsule QTY: 24 capsule Days: 90 Refills: 1  Written: 20 Patient Instructions: Vitamin D2 1,250 mcg (50,000 unit) oral capsule Dispense: 24 - Take 1 capsule twice weekly. capsules 24 capsule 0    estradioL (ESTRACE) 1 MG tablet Take 1 mg by mouth once daily.      HYDROcodone-acetaminophen (NORCO) 7.5-325 mg per tablet TAKE 1 TABLET BY MOUTH EVERY 12 TO 24 HOURS.      ibandronate (BONIVA) 150 mg tablet Take 1 tablet (150 mg total) by mouth every 30 days. 1 tablet 5    ipratropium (ATROVENT) 21 mcg (0.03 %) nasal spray 2 sprays by Nasal route 3 (three) times daily. 30 mL 0    meloxicam (MOBIC) 7.5 MG tablet Take 7.5 mg by mouth every Monday and Thursday.      montelukast (SINGULAIR) 10 mg tablet Take 1 tablet (10 mg total) by mouth every evening. 30 tablet 5    ondansetron (ZOFRAN) 4 MG tablet Take 1 tablet (4 mg total) by mouth every 6 (six) hours as needed for Nausea. 30 tablet 0    pantoprazole (PROTONIX) 40 MG tablet Take 40 mg by mouth once daily.      prednisoLONE acetate (PRED FORTE) 1 % DrpS       predniSONE (DELTASONE) 20 MG tablet Take 1 tablet (20 mg total) by mouth once daily. (Patient not taking: No sig reported) 5 tablet 0    pseudoephedrine (SUDAFED) 30 MG tablet Sudafed 30 MG Oral Tablet QTY: 30 tablet Days: 30 Refills: 0  Written: 21 Patient Instructions: as directed--Take one tablet daily as needed      RESTASIS 0.05 % ophthalmic emulsion SMARTSI Drop(s) In Eye(s) Every 12 Hours      sumatriptan  (IMITREX) 25 MG Tab Imitrex 25 MG Oral Tablet QTY: 9  Days: 0 Refills: 1  Written: 08/17/20 Patient Instructions: Imitrex 25 mg oral tablet Dispense: 9 - take 1 tablet (25 mg) by oral route once with fluids as early as possible after the onset of a migraine attack;may repeat after 2 hours if headache returns, not to exceed 200mg in 24hrs tablets      SYNTHROID 75 mcg tablet Take 1 tablet (75 mcg total) by mouth once daily. 30 tablet 5    traMADoL (ULTRAM) 50 mg tablet Take 50 mg by mouth 2 (two) times daily as needed.      traZODone (DESYREL) 50 MG tablet One to two tablets at bedtime as needed for insomnia 60 tablet 2    valACYclovir (VALTREX) 1000 MG tablet Take 1 tablet (1,000 mg total) by mouth 3 (three) times daily. for 7 days 21 tablet 0    ZYRTEC-D 5-120 mg Tb12 Take 1 tablet by mouth 2 (two) times daily as needed.      [DISCONTINUED] ergocalciferol (VITAMIN D2) 50,000 unit Cap Vitamin D (Ergocalciferol) 1.25 MG (14371 UT) Oral Capsule QTY: 24 capsule Days: 90 Refills: 1  Written: 12/14/20 Patient Instructions: Vitamin D2 1,250 mcg (50,000 unit) oral capsule Dispense: 24 - Take 1 capsule twice weekly. capsules 24 capsule 0    [DISCONTINUED] ondansetron (ZOFRAN) 4 MG tablet Take 1 tablet (4 mg total) by mouth every 6 (six) hours as needed for Nausea. 30 tablet 0    [DISCONTINUED] SYNTHROID 75 mcg tablet Take 1 tablet (75 mcg total) by mouth once daily. 30 tablet 5     No facility-administered encounter medications on file as of 6/16/2022.       Allergies:  Review of patient's allergies indicates:   Allergen Reactions    Phenergan [promethazine]      IV phenergan only    Sulfa (sulfonamide antibiotics) Blisters    Opioids - morphine analogues Rash    Penicillins Rash       Health Maintenance:    There is no immunization history on file for this patient.   Health Maintenance   Topic Date Due    Hepatitis C Screening  Never done    TETANUS VACCINE  Never done    Mammogram  07/30/2021    Lipid  "Panel  06/01/2022    DEXA Scan  12/30/2023        Physical Exam      Vital Signs  Temp: 97.9 °F (36.6 °C)  Temp src: Oral  Pulse: 83  SpO2: 98 %  BP: 110/60  BP Location: Left arm  Patient Position: Sitting  Height and Weight  Height: 5' 1" (154.9 cm)  Weight: 68.9 kg (152 lb)  BSA (Calculated - sq m): 1.72 sq meters  BMI (Calculated): 28.7  Weight in (lb) to have BMI = 25: 132]    Physical Exam  Constitutional:       Appearance: Normal appearance.   HENT:      Head: Normocephalic and atraumatic.   Eyes:      Extraocular Movements: Extraocular movements intact.      Conjunctiva/sclera: Conjunctivae normal.      Pupils: Pupils are equal, round, and reactive to light.   Cardiovascular:      Rate and Rhythm: Normal rate and regular rhythm.      Pulses: Normal pulses.           Radial pulses are 2+ on the right side and 2+ on the left side.      Heart sounds: Normal heart sounds.   Pulmonary:      Effort: Pulmonary effort is normal.      Breath sounds: Normal breath sounds.   Abdominal:      Palpations: Abdomen is soft.      Tenderness: There is no abdominal tenderness.   Musculoskeletal:         General: Normal range of motion.      Right lower leg: No edema.      Left lower leg: No edema.   Skin:     General: Skin is warm and dry.      Findings: No rash.   Neurological:      General: No focal deficit present.      Mental Status: She is alert. Mental status is at baseline.   Psychiatric:         Mood and Affect: Mood normal.          Laboratory:  CBC:  Recent Labs   Lab 06/01/21  1400 11/15/21  1614 06/16/22  1432   WBC 5.02 7.54 6.59   RBC 4.54 4.66 4.72   Hemoglobin 12.7 13.2 14.1   Hematocrit 40.6 40.1 42.5   Platelet Count 249 267 277   MCV 89.4 86.1 90.0   MCH 28.0 28.3 29.9   MCHC 31.3 L 32.9 33.2     CMP:  Recent Labs   Lab 04/21/21  1627 06/01/21  1400 11/15/21  1614 06/16/22  1432   Glucose 77 87   < > 79   Calcium 8.7 8.6   < > 8.9   Albumin 4.0 3.9  --  4.0   Total Protein 6.9 7.1  --  6.7   Sodium 144 141  "  < > 141   Potassium 4.0 3.9   < > 4.2   CO2 30 26   < > 28   Chloride 110 H 108 H   < > 106   BUN 9 9   < > 9   Alk Phos 69 59  --  97   ALT 21 31  --  27   AST 13 L 22  --  16   Bilirubin, Total 0.4 0.3  --  0.4    < > = values in this interval not displayed.     LIPIDS:  Recent Labs   Lab 06/01/21  1400 11/15/21  1614 06/16/22  1432   TSH 0.839 2.050 1.640   HDL Cholesterol 56  --   --    Cholesterol 221 H  --   --    Triglycerides 119  --   --    LDL Calculated 141  --   --    Cholesterol/HDL Ratio (Risk Factor) 3.9  --   --    Non-  --   --      TSH:  Recent Labs   Lab 06/01/21  1400 11/15/21  1614 06/16/22  1432   TSH 0.839 2.050 1.640     A1C:  Recent Labs   Lab 06/01/21  1400   Hemoglobin A1C 5.3       Assessment/Plan     Muna Newberry is a 56 y.o.female with:     1. Hypothyroidism, unspecified type  - SYNTHROID 75 mcg tablet; Take 1 tablet (75 mcg total) by mouth once daily.  Dispense: 30 tablet; Refill: 5  - TSH; Future  - TSH    2. Other iron deficiency anemia  - CBC Auto Differential; Future  - Comprehensive Metabolic Panel; Future  - Iron and TIBC; Future  - Ferritin; Future  - CBC Auto Differential  - Comprehensive Metabolic Panel  - Iron and TIBC  - Ferritin    3. Vitamin D deficiency  - Vitamin D; Future  - Vitamin D       Total time spent face-to-face and non-face-to-face coordinating care for this encounter was: 30 min    Chronic conditions status updated as per HPI.  Other than changes above, cont current medications and maintain follow up with specialists.  Return to clinic in 6 months.    Spike Mathis,   [facility]

## 2022-06-20 ENCOUNTER — TELEPHONE (OUTPATIENT)
Dept: FAMILY MEDICINE | Facility: CLINIC | Age: 57
End: 2022-06-20
Payer: COMMERCIAL

## 2022-06-20 NOTE — TELEPHONE ENCOUNTER
----- Message from Spike Mathis DO sent at 6/17/2022 12:33 PM CDT -----  Ferritin and other iron studies normal. TSH and other labs normal.

## 2022-09-12 ENCOUNTER — HOSPITAL ENCOUNTER (OUTPATIENT)
Dept: RADIOLOGY | Facility: HOSPITAL | Age: 57
Discharge: HOME | End: 2022-09-12
Payer: COMMERCIAL

## 2022-09-12 DIAGNOSIS — Z12.31 VISIT FOR SCREENING MAMMOGRAM: ICD-10-CM

## 2022-09-12 PROCEDURE — 77067 SCR MAMMO BI INCL CAD: CPT | Mod: TC

## 2022-09-14 ENCOUNTER — OFFICE VISIT (OUTPATIENT)
Dept: FAMILY MEDICINE | Facility: CLINIC | Age: 57
End: 2022-09-14
Payer: COMMERCIAL

## 2022-09-14 VITALS
TEMPERATURE: 98 F | HEIGHT: 61 IN | WEIGHT: 150 LBS | OXYGEN SATURATION: 99 % | DIASTOLIC BLOOD PRESSURE: 76 MMHG | HEART RATE: 92 BPM | BODY MASS INDEX: 28.32 KG/M2 | SYSTOLIC BLOOD PRESSURE: 132 MMHG

## 2022-09-14 DIAGNOSIS — G43.111 INTRACTABLE MIGRAINE WITH AURA WITH STATUS MIGRAINOSUS: ICD-10-CM

## 2022-09-14 DIAGNOSIS — G43.111 INTRACTABLE MIGRAINE WITH AURA WITH STATUS MIGRAINOSUS: Primary | ICD-10-CM

## 2022-09-14 PROCEDURE — 1159F PR MEDICATION LIST DOCUMENTED IN MEDICAL RECORD: ICD-10-PCS | Mod: ,,, | Performed by: NURSE PRACTITIONER

## 2022-09-14 PROCEDURE — 1159F MED LIST DOCD IN RCRD: CPT | Mod: ,,, | Performed by: NURSE PRACTITIONER

## 2022-09-14 PROCEDURE — 3075F SYST BP GE 130 - 139MM HG: CPT | Mod: ,,, | Performed by: NURSE PRACTITIONER

## 2022-09-14 PROCEDURE — 99213 OFFICE O/P EST LOW 20 MIN: CPT | Mod: ,,, | Performed by: NURSE PRACTITIONER

## 2022-09-14 PROCEDURE — 1160F PR REVIEW ALL MEDS BY PRESCRIBER/CLIN PHARMACIST DOCUMENTED: ICD-10-PCS | Mod: ,,, | Performed by: NURSE PRACTITIONER

## 2022-09-14 PROCEDURE — 3008F PR BODY MASS INDEX (BMI) DOCUMENTED: ICD-10-PCS | Mod: ,,, | Performed by: NURSE PRACTITIONER

## 2022-09-14 PROCEDURE — 99213 PR OFFICE/OUTPT VISIT, EST, LEVL III, 20-29 MIN: ICD-10-PCS | Mod: ,,, | Performed by: NURSE PRACTITIONER

## 2022-09-14 PROCEDURE — 3078F DIAST BP <80 MM HG: CPT | Mod: ,,, | Performed by: NURSE PRACTITIONER

## 2022-09-14 PROCEDURE — 3075F PR MOST RECENT SYSTOLIC BLOOD PRESS GE 130-139MM HG: ICD-10-PCS | Mod: ,,, | Performed by: NURSE PRACTITIONER

## 2022-09-14 PROCEDURE — 3008F BODY MASS INDEX DOCD: CPT | Mod: ,,, | Performed by: NURSE PRACTITIONER

## 2022-09-14 PROCEDURE — 1160F RVW MEDS BY RX/DR IN RCRD: CPT | Mod: ,,, | Performed by: NURSE PRACTITIONER

## 2022-09-14 PROCEDURE — 3078F PR MOST RECENT DIASTOLIC BLOOD PRESSURE < 80 MM HG: ICD-10-PCS | Mod: ,,, | Performed by: NURSE PRACTITIONER

## 2022-09-14 RX ORDER — PROMETHAZINE HYDROCHLORIDE 12.5 MG/1
12.5 TABLET ORAL EVERY 6 HOURS PRN
Qty: 30 TABLET | Refills: 0 | Status: SHIPPED | OUTPATIENT
Start: 2022-09-14 | End: 2024-03-25

## 2022-09-14 RX ORDER — RIMEGEPANT SULFATE 75 MG/75MG
75 TABLET, ORALLY DISINTEGRATING ORAL DAILY PRN
Qty: 8 TABLET | Refills: 2 | Status: SHIPPED | OUTPATIENT
Start: 2022-09-14 | End: 2022-09-14 | Stop reason: SDUPTHER

## 2022-09-14 RX ORDER — RIMEGEPANT SULFATE 75 MG/75MG
75 TABLET, ORALLY DISINTEGRATING ORAL DAILY PRN
Qty: 30 TABLET | Refills: 0 | Status: SHIPPED | OUTPATIENT
Start: 2022-09-14 | End: 2023-06-02

## 2022-09-14 NOTE — PROGRESS NOTES
[facility] Medicine          Chief Complaint        Chief Complaint   Patient presents with    Referral             History of Present Illness           Muna Newberry is a 57 y.o. female with chronic conditions of migraines who presents today for headache.  Pt has had persistent migraines with aura for years and has had multiple treatments for this including sumatriptan, maxalt, amitriptyline, topamax, fioricet with no improvement.  Pt would like to try Nurtec next to see if this can improve her s/s.  She was following with Dr. Castro but no longer seeing him due to him moving from [place].            Past Medical History:     Past Medical History:   Diagnosis Date    Idiopathic thrombotic thrombocytopenic purpura     Osteoarthrosis     Osteoporosis              Past Surgical History:      has a past surgical history that includes Tonsillectomy; Cholecystectomy; Hysterectomy; Appendectomy; Oophorectomy; and Breast biopsy.          Social History:     Social History     Tobacco Use    Smoking status: Never    Smokeless tobacco: Never   Substance Use Topics    Alcohol use: Never    Drug use: Never             I personally reviewed all past medical, surgical, and social.           Review of Systems   Constitutional: Negative.    HENT: Negative.     Eyes: Negative.    Respiratory: Negative.     Cardiovascular: Negative.    Gastrointestinal: Negative.    Endocrine: Negative.    Genitourinary: Negative.    Musculoskeletal: Negative.    Skin: Negative.    Allergic/Immunologic: Negative.    Neurological:  Positive for headaches.   Psychiatric/Behavioral: Negative.              Medications:     Outpatient Encounter Medications as of 9/14/2022   Medication Sig Dispense Refill    albuterol (VENTOLIN HFA) 90 mcg/actuation inhaler Inhale 2 puffs into the lungs every 6 (six) hours as needed for Wheezing. Rescue 18 g 5    ergocalciferol (ERGOCALCIFEROL) 50,000 unit Cap Take 1 capsule (50,000 Units total) by mouth twice a  week. 8 capsule 5    ergocalciferol (VITAMIN D2) 50,000 unit Cap Vitamin D (Ergocalciferol) 1.25 MG (17707 UT) Oral Capsule QTY: 24 capsule Days: 90 Refills: 1  Written: 20 Patient Instructions: Vitamin D2 1,250 mcg (50,000 unit) oral capsule Dispense: 24 - Take 1 capsule twice weekly. capsules 24 capsule 0    HYDROcodone-acetaminophen (NORCO) 7.5-325 mg per tablet TAKE 1 TABLET BY MOUTH EVERY 12 TO 24 HOURS.      ipratropium (ATROVENT) 21 mcg (0.03 %) nasal spray 2 sprays by Nasal route 3 (three) times daily. 30 mL 0    pantoprazole (PROTONIX) 40 MG tablet Take 40 mg by mouth once daily.      prednisoLONE acetate (PRED FORTE) 1 % DrpS       promethazine (PHENERGAN) 12.5 MG Tab Take 1 tablet (12.5 mg total) by mouth every 6 (six) hours as needed (nausea assoc. with migraines). 30 tablet 0    RESTASIS 0.05 % ophthalmic emulsion SMARTSI Drop(s) In Eye(s) Every 12 Hours      rimegepant (NURTEC) 75 mg odt Take 1 tablet (75 mg total) by mouth daily as needed for Migraine. Place ODT tablet on the tongue; alternatively the ODT tablet may be placed under the tongue 8 tablet 2    SYNTHROID 75 mcg tablet Take 1 tablet (75 mcg total) by mouth once daily. 30 tablet 5    traMADoL (ULTRAM) 50 mg tablet Take 50 mg by mouth 2 (two) times daily as needed.      [DISCONTINUED] azithromycin (Z-MAR) 250 MG tablet Take 2 tablets by mouth on day 1; Take 1 tablet by mouth on days 2-5 6 tablet 0    [DISCONTINUED] estradioL (ESTRACE) 1 MG tablet Take 1 mg by mouth once daily.      [DISCONTINUED] ibandronate (BONIVA) 150 mg tablet Take 1 tablet (150 mg total) by mouth every 30 days. 1 tablet 5    [DISCONTINUED] meloxicam (MOBIC) 7.5 MG tablet Take 7.5 mg by mouth every Monday and Thursday.      [DISCONTINUED] montelukast (SINGULAIR) 10 mg tablet Take 1 tablet (10 mg total) by mouth every evening. 30 tablet 5    [DISCONTINUED] ondansetron (ZOFRAN) 4 MG tablet Take 1 tablet (4 mg total) by mouth every 6 (six) hours as needed for  "Nausea. 30 tablet 0    [DISCONTINUED] predniSONE (DELTASONE) 20 MG tablet Take 1 tablet (20 mg total) by mouth once daily. (Patient not taking: No sig reported) 5 tablet 0    [DISCONTINUED] pseudoephedrine (SUDAFED) 30 MG tablet Sudafed 30 MG Oral Tablet QTY: 30 tablet Days: 30 Refills: 0  Written: 01/22/21 Patient Instructions: as directed--Take one tablet daily as needed      [DISCONTINUED] sumatriptan (IMITREX) 25 MG Tab Imitrex 25 MG Oral Tablet QTY: 9  Days: 0 Refills: 1  Written: 08/17/20 Patient Instructions: Imitrex 25 mg oral tablet Dispense: 9 - take 1 tablet (25 mg) by oral route once with fluids as early as possible after the onset of a migraine attack;may repeat after 2 hours if headache returns, not to exceed 200mg in 24hrs tablets      [DISCONTINUED] traZODone (DESYREL) 50 MG tablet One to two tablets at bedtime as needed for insomnia 60 tablet 2    [DISCONTINUED] valACYclovir (VALTREX) 1000 MG tablet Take 1 tablet (1,000 mg total) by mouth 3 (three) times daily. for 7 days 21 tablet 0    [DISCONTINUED] ZYRTEC-D 5-120 mg Tb12 Take 1 tablet by mouth 2 (two) times daily as needed.       No facility-administered encounter medications on file as of 9/14/2022.             Allergies:     Review of patient's allergies indicates:   Allergen Reactions    Phenergan [promethazine]      IV phenergan only    Sulfa (sulfonamide antibiotics) Blisters    Opioids - morphine analogues Rash    Penicillins Rash             Health Maintenance:       There is no immunization history on file for this patient.      Health Maintenance   Topic Date Due    Hepatitis C Screening  Never done    TETANUS VACCINE  Never done    Lipid Panel  06/01/2022    Mammogram  09/12/2023    DEXA Scan  12/30/2023              Physical Exam           Vital Signs  Temp: 98.3 °F (36.8 °C)  Temp src: Oral  Pulse: 92  SpO2: 99 %  BP: 132/76  BP Location: Right arm  Patient Position: Sitting  Height and Weight  Height: 5' 1" (154.9 cm)  Weight: 68 kg " (150 lb)  BSA (Calculated - sq m): 1.71 sq meters  BMI (Calculated): 28.4  Weight in (lb) to have BMI = 25: 132]          Physical Exam  Vitals and nursing note reviewed.   Constitutional:       General: She is not in acute distress.     Appearance: Normal appearance. She is not ill-appearing.   HENT:      Head: Normocephalic.      Right Ear: External ear normal.      Left Ear: External ear normal.      Nose: Nose normal. No congestion or rhinorrhea.      Mouth/Throat:      Mouth: Mucous membranes are moist.   Eyes:      Pupils: Pupils are equal, round, and reactive to light.   Cardiovascular:      Rate and Rhythm: Normal rate and regular rhythm.      Pulses: Normal pulses.      Heart sounds: Normal heart sounds. No murmur heard.    No friction rub. No gallop.   Pulmonary:      Effort: Pulmonary effort is normal. No respiratory distress.      Breath sounds: Normal breath sounds. No stridor. No wheezing, rhonchi or rales.   Abdominal:      General: There is no distension.      Palpations: Abdomen is soft.   Musculoskeletal:         General: Normal range of motion.      Cervical back: Normal range of motion and neck supple. No rigidity or tenderness.   Skin:     General: Skin is warm and dry.      Coloration: Skin is not jaundiced or pale.   Neurological:      General: No focal deficit present.      Mental Status: She is alert and oriented to person, place, and time. Mental status is at baseline.      Cranial Nerves: No cranial nerve deficit.      Sensory: No sensory deficit.      Motor: No weakness.      Coordination: Coordination normal.      Gait: Gait normal.   Psychiatric:         Mood and Affect: Mood normal.         Behavior: Behavior normal.         Thought Content: Thought content normal.         Judgment: Judgment normal.              Laboratory:     CBC:     Recent Labs   Lab 06/01/21  1400 11/15/21  1614 06/16/22  1432   WBC 5.02 7.54 6.59   RBC 4.54 4.66 4.72   Hemoglobin 12.7 13.2 14.1   Hematocrit 40.6  40.1 42.5   Platelet Count 249 267 277   MCV 89.4 86.1 90.0   MCH 28.0 28.3 29.9   MCHC 31.3 L 32.9 33.2        CMP:     Recent Labs   Lab 04/21/21  1627 06/01/21  1400 11/15/21  1614 06/16/22  1432   Glucose 77 87   < > 79   Calcium 8.7 8.6   < > 8.9   Albumin 4.0 3.9  --  4.0   Total Protein 6.9 7.1  --  6.7   Sodium 144 141   < > 141   Potassium 4.0 3.9   < > 4.2   CO2 30 26   < > 28   Chloride 110 H 108 H   < > 106   BUN 9 9   < > 9   Alk Phos 69 59  --  97   ALT 21 31  --  27   AST 13 L 22  --  16   Bilirubin, Total 0.4 0.3  --  0.4    < > = values in this interval not displayed.        LIPIDS:     Recent Labs   Lab 06/01/21  1400 11/15/21  1614 06/16/22  1432   TSH 0.839 2.050 1.640   HDL Cholesterol 56  --   --    Cholesterol 221 H  --   --    Triglycerides 119  --   --    LDL Calculated 141  --   --    Cholesterol/HDL Ratio (Risk Factor) 3.9  --   --    Non-  --   --         TSH:     Recent Labs   Lab 06/01/21  1400 11/15/21  1614 06/16/22  1432   TSH 0.839 2.050 1.640        A1C:     Recent Labs   Lab 06/01/21  1400   Hemoglobin A1C 5.3             Assessment/Plan          Muna Newberry is a 57 y.o.female with:           1. Intractable migraine with aura with status migrainosus  - rimegepant (NURTEC) 75 mg odt; Take 1 tablet (75 mg total) by mouth daily as needed for Migraine. Place ODT tablet on the tongue; alternatively the ODT tablet may be placed under the tongue  Dispense: 8 tablet; Refill: 2  - promethazine (PHENERGAN) 12.5 MG Tab; Take 1 tablet (12.5 mg total) by mouth every 6 (six) hours as needed (nausea assoc. with migraines).  Dispense: 30 tablet; Refill: 0      Plan:  -try Nurtec due to failure of multiple migraine meds mentioned in HPI; refer to Dr. Garby       Total time spent face-to-face and non-face-to-face coordinating care for this encounter was: 25 min          Chronic conditions status updated as per HPI.  Other than changes above, cont current medications and maintain follow  up with specialists.  Return to clinic PRN.          NORIS PalafoxC     [facility]

## 2022-11-14 ENCOUNTER — OFFICE VISIT (OUTPATIENT)
Dept: NEUROLOGY | Facility: CLINIC | Age: 57
End: 2022-11-14
Payer: COMMERCIAL

## 2022-11-14 VITALS
HEIGHT: 61 IN | SYSTOLIC BLOOD PRESSURE: 120 MMHG | OXYGEN SATURATION: 98 % | HEART RATE: 76 BPM | BODY MASS INDEX: 27.94 KG/M2 | WEIGHT: 148 LBS | DIASTOLIC BLOOD PRESSURE: 76 MMHG

## 2022-11-14 DIAGNOSIS — G43.111 INTRACTABLE MIGRAINE WITH AURA WITH STATUS MIGRAINOSUS: Primary | ICD-10-CM

## 2022-11-14 PROCEDURE — 99204 PR OFFICE/OUTPT VISIT, NEW, LEVL IV, 45-59 MIN: ICD-10-PCS | Mod: S$PBB,,, | Performed by: PSYCHIATRY & NEUROLOGY

## 2022-11-14 PROCEDURE — 99215 OFFICE O/P EST HI 40 MIN: CPT | Mod: PBBFAC | Performed by: PSYCHIATRY & NEUROLOGY

## 2022-11-14 PROCEDURE — 1160F PR REVIEW ALL MEDS BY PRESCRIBER/CLIN PHARMACIST DOCUMENTED: ICD-10-PCS | Mod: ,,, | Performed by: PSYCHIATRY & NEUROLOGY

## 2022-11-14 PROCEDURE — 1159F MED LIST DOCD IN RCRD: CPT | Mod: ,,, | Performed by: PSYCHIATRY & NEUROLOGY

## 2022-11-14 PROCEDURE — 1160F RVW MEDS BY RX/DR IN RCRD: CPT | Mod: ,,, | Performed by: PSYCHIATRY & NEUROLOGY

## 2022-11-14 PROCEDURE — 99204 OFFICE O/P NEW MOD 45 MIN: CPT | Mod: S$PBB,,, | Performed by: PSYCHIATRY & NEUROLOGY

## 2022-11-14 PROCEDURE — 3074F PR MOST RECENT SYSTOLIC BLOOD PRESSURE < 130 MM HG: ICD-10-PCS | Mod: ,,, | Performed by: PSYCHIATRY & NEUROLOGY

## 2022-11-14 PROCEDURE — 3078F DIAST BP <80 MM HG: CPT | Mod: ,,, | Performed by: PSYCHIATRY & NEUROLOGY

## 2022-11-14 PROCEDURE — 3078F PR MOST RECENT DIASTOLIC BLOOD PRESSURE < 80 MM HG: ICD-10-PCS | Mod: ,,, | Performed by: PSYCHIATRY & NEUROLOGY

## 2022-11-14 PROCEDURE — 3008F BODY MASS INDEX DOCD: CPT | Mod: ,,, | Performed by: PSYCHIATRY & NEUROLOGY

## 2022-11-14 PROCEDURE — 1159F PR MEDICATION LIST DOCUMENTED IN MEDICAL RECORD: ICD-10-PCS | Mod: ,,, | Performed by: PSYCHIATRY & NEUROLOGY

## 2022-11-14 PROCEDURE — 3008F PR BODY MASS INDEX (BMI) DOCUMENTED: ICD-10-PCS | Mod: ,,, | Performed by: PSYCHIATRY & NEUROLOGY

## 2022-11-14 PROCEDURE — 3074F SYST BP LT 130 MM HG: CPT | Mod: ,,, | Performed by: PSYCHIATRY & NEUROLOGY

## 2022-11-14 RX ORDER — ATOGEPANT 60 MG/1
60 TABLET ORAL DAILY
Qty: 90 TABLET | Refills: 3 | Status: SHIPPED | OUTPATIENT
Start: 2022-11-14 | End: 2023-06-01

## 2022-11-14 NOTE — PATIENT INSTRUCTIONS
Avoid any triggers or stressors  Healthy lifestyle habits   Cont Pain mgmt eval  Trial Qulipta daily   F/u 3 months

## 2022-11-14 NOTE — PROGRESS NOTES
Subjective:       Patient ID: Muna Newberry is a 57 y.o. female     Chief Complaint:    Chief Complaint   Patient presents with    Migraine        Allergies:  Phenergan [promethazine], Sulfa (sulfonamide antibiotics), Opioids - morphine analogues, and Penicillins    Current Medications:    Outpatient Encounter Medications as of 2022   Medication Sig Dispense Refill    albuterol (VENTOLIN HFA) 90 mcg/actuation inhaler Inhale 2 puffs into the lungs every 6 (six) hours as needed for Wheezing. Rescue 18 g 5    ergocalciferol (ERGOCALCIFEROL) 50,000 unit Cap Take 1 capsule (50,000 Units total) by mouth twice a week. 8 capsule 5    HYDROcodone-acetaminophen (NORCO) 7.5-325 mg per tablet TAKE 1 TABLET BY MOUTH EVERY 12 TO 24 HOURS.      prednisoLONE acetate (PRED FORTE) 1 % DrpS       promethazine (PHENERGAN) 12.5 MG Tab Take 1 tablet (12.5 mg total) by mouth every 6 (six) hours as needed (nausea assoc. with migraines). 30 tablet 0    RESTASIS 0.05 % ophthalmic emulsion SMARTSI Drop(s) In Eye(s) Every 12 Hours      rimegepant (NURTEC) 75 mg odt Take 1 tablet (75 mg total) by mouth daily as needed for Migraine. Place ODT tablet on the tongue; alternatively the ODT tablet may be placed under the tongue 30 tablet 0    SYNTHROID 75 mcg tablet Take 1 tablet (75 mcg total) by mouth once daily. 30 tablet 5    traMADoL (ULTRAM) 50 mg tablet Take 50 mg by mouth 2 (two) times daily as needed.      ergocalciferol (VITAMIN D2) 50,000 unit Cap Vitamin D (Ergocalciferol) 1.25 MG (62429 UT) Oral Capsule QTY: 24 capsule Days: 90 Refills: 1  Written: 20 Patient Instructions: Vitamin D2 1,250 mcg (50,000 unit) oral capsule Dispense: 24 - Take 1 capsule twice weekly. capsules (Patient not taking: Reported on 2022) 24 capsule 0    ipratropium (ATROVENT) 21 mcg (0.03 %) nasal spray 2 sprays by Nasal route 3 (three) times daily. (Patient not taking: Reported on 2022) 30 mL 0    pantoprazole (PROTONIX) 40 MG  "tablet Take 40 mg by mouth once daily.       No facility-administered encounter medications on file as of 11/14/2022.       History of Present Illness  Patient is a 57-year-old white female who presents in clinic after several years of chronic migraine- like headaches that had been refractory previously to multiple over-the-counter meds, migraine abortive agents and migraine prophylactics including triptans, Topamax, Elavil, Fioricet, etc.   Patient recently started a trial of Nurtec p.r.n. for hopeful improvement of migraine headache frequency and intensity- it helped her mod well.  Pt has also failed all 3 neuropathic pain meds for postherpetic neuralgia   Describes HEADACHE origin as posterior occipital in nature and radiates anteriorly over L facial area - stress/ anxiety, poor sleep exacerbate above symptoms   Pt gets Lidocaine injec's  quarterly from Pain mgmt which works well   Spinal stimulator in thoracic spine for chronic spinal pains but no surgery required   Freq roughly weekly to twice weekly       Past Medical History:   Diagnosis Date    Idiopathic thrombotic thrombocytopenic purpura     Osteoarthrosis     Osteoporosis        Past Surgical History:   Procedure Laterality Date    APPENDECTOMY      BREAST BIOPSY      CHOLECYSTECTOMY      HYSTERECTOMY      OOPHORECTOMY      TONSILLECTOMY         Social History  Ms. Newberry  reports that she has never smoked. She has never used smokeless tobacco. She reports that she does not drink alcohol and does not use drugs.    Family History  Ms.'s Newberry family history includes Breast cancer in her paternal aunt; Heart disease in her mother; Leukemia in her father.    Review of Systems  Review of Systems   Neurological:  Positive for headaches.   All other systems reviewed and are negative.   Objective:   /76 (BP Location: Right arm, Patient Position: Sitting, BP Method: Large (Manual))   Pulse 76   Ht 5' 1" (1.549 m)   Wt 67.1 kg (148 lb)   SpO2 98%   BMI " 27.96 kg/m²    NEUROLOGICAL EXAMINATION:     MENTAL STATUS   Oriented to person, place, and time.   Level of consciousness: alert  Knowledge: good.     CRANIAL NERVES   Cranial nerves II through XII intact.     MOTOR EXAM     Strength   Strength 5/5 throughout.     GAIT AND COORDINATION     Gait  Gait: normal     Physical Exam  Vitals reviewed.   Constitutional:       Appearance: She is normal weight.   Neurological:      General: No focal deficit present.      Mental Status: She is alert and oriented to person, place, and time. Mental status is at baseline.      Cranial Nerves: Cranial nerves 2-12 are intact.      Motor: Motor strength is normal.      Gait: Gait is intact.        Assessment:     Intractable migraine with aura with status migrainosus  -     Ambulatory referral/consult to Neurology       Primary Diagnosis and ICD10  Intractable migraine with aura with status migrainosus [G43.111]    Plan:     There are no Patient Instructions on file for this visit.    There are no discontinued medications.    Requested Prescriptions      No prescriptions requested or ordered in this encounter

## 2022-11-22 RX ORDER — GALCANEZUMAB 120 MG/ML
120 INJECTION, SOLUTION SUBCUTANEOUS
Qty: 3 EACH | Refills: 3 | Status: SHIPPED | OUTPATIENT
Start: 2022-11-22 | End: 2023-10-02

## 2022-12-21 NOTE — TELEPHONE ENCOUNTER
"Subjective:     Chief Complaint   Patient presents with    Follow-Up     Physical therapy        HPI:   Jaclyn presents today with     Problem   Tobacco Dependence    Chronic in nature. Stable. Continues smoking a pack of cigarettes daily. still not ready to quit.     Pelvic Floor Dysfunction    Chronic in nature. Significant improvement with pelvic floor PT. States rectal and bladder prolapse. Pain has been much improved.     Stage 3b Chronic Kidney Disease (Hcc)    Continues to follow with Dr. Najjar and is doing well.  Labs 11/10/2022 indicated GFR of 45.  This is a slight increase.  Patient states she is doing well with hydration.     Sciatica of Right Side    Chronic in nature. States the \"overlapping vertebra\" and she has had flares of sciatica pain. States needs to continue current PT prior to trying another PT.     Essential Tremor    Chronic in nature. Worsening recently. States due to increased excitement. States she does not want to take medication. States she is still overall ok currently. States will plan to decrease caffeine.     Bipolar Affective Disorder, Current Episode Depressed (Hcc)    Chronic in nature. Stable. Continues taking remeron 30 mg PO nightly.  Treatment continues to work well without side effects.     Facet arthropathy, lumbar    States significantly more severe recently. States that she has been struggling with this recently. States that it has been difficult to control. Interested in pain management in the new year.      Fibromyalgia    Chronic in nature. Stable overall.  Has had a lot of increased pain but states that she feels she is overall doing well.  Patient does not wish to take narcotic pain medications we discussed this as she states these are not well researched for fibromyalgia and she states she does not feel that they would be a good choice for her discussed that I agree with her. continues taking gabapentin 600 mg PO three times daily and voltaren 4 times daily. " Spoke to patient verbalized results. Patient understood. No questions or concerns voiced at this time.   States pain is well-managed with current regimen. States that she attempts to walk for 30 minutes daily. Reports that her fibromyalgia is sensitive to cold.  States that there has been a lot going on lately putting hopefully closing on her house soon.      Mood Disorder (Hcc) (Resolved)   Abdominal Pain, Luq (Left Upper Quadrant) (Resolved)   Essential Hypertension (Resolved)    Blood pressure today is low at 112/62.  Patient states that it has been running in the 110s at home.  She does continue to have dizziness, unchanged.  Denies any headaches, blurry vision, chest pain or palpitations.  Consider referral to neurology.     Recurrent Major Depressive Disorder (Hcc) (Resolved)       Current Outpatient Medications Ordered in Epic   Medication Sig Dispense Refill    topiramate (TOPAMAX) 50 MG tablet TAKE 1 TABLET BY MOUTH THREE TIMES A  Tablet 0    mirtazapine (REMERON) 30 MG Tab tablet TAKE 1 TABLET BY MOUTH EVERYDAY AT BEDTIME 90 Tablet 0    Milnacipran HCl (SAVELLA) 100 MG Tab Take 1 Tablet by mouth 2 times a day. 180 Tablet 2    baclofen (LIORESAL) 10 MG Tab Take 1 Tablet by mouth 3 times a day. 270 Tablet 3    albuterol (PROAIR HFA) 108 (90 Base) MCG/ACT Aero Soln inhalation aerosol Inhale 2 Puffs every four hours as needed for Shortness of Breath. 8.5 g 0    gabapentin (NEURONTIN) 600 MG tablet TAKE 1 TABLET BY MOUTH THREE TIMES A  Tablet 0    diclofenac sodium (VOLTAREN) 1 % Gel Apply 2 g topically 4 times a day as needed (pain). 100 g 0    topiramate (TOPAMAX) 50 MG tablet Take 1 Tablet by mouth 3 times a day. 270 Tablet 0    gabapentin (NEURONTIN) 600 MG tablet Take 1 Tablet by mouth 3 times a day. 270 Tablet 0    ondansetron (ZOFRAN ODT) 4 MG TABLET DISPERSIBLE Take 1 Tablet by mouth every 8 hours as needed for Nausea. 15 Tablet 0    Omega-3 Fatty Acids (FISH OIL) 1000 MG Cap capsule Take 1,000 mg by mouth 1 time a day as needed.      magnesium oxide (MAG-OX) 400 MG Tab tablet Take 400 mg by  "mouth every day.      clindamycin (CLEOCIN) 2 % vaginal cream Use vaginally after sexual activity for irritation and odor related to BV. 40 g 0    lidocaine (LIDODERM) 5 % Patch Place 1 Patch on the skin every 24 hours. 10 Patch 0    Loratadine (CLARITIN PO) Take 1 tablet by mouth every day.      PROAIR  (90 Base) MCG/ACT Aero Soln inhalation aerosol INHALE 2 PUFFS BY MOUTH EVERY 6 HOURS AS NEEDED FOR SHORTNESS OF BREATH. 8.5 Inhaler 3    hydrOXYzine HCl (ATARAX) 25 MG Tab Take 1 Tab by mouth 3 times a day as needed for Anxiety. 90 Tab 0    dicyclomine (BENTYL) 20 MG Tab Take 20 mg by mouth every 6 hours as needed.       No current Epic-ordered facility-administered medications on file.       Health Maintenance: Discuss hep C screening with next labs    Review of Systems   Constitutional:  Negative for chills, fever and malaise/fatigue.   HENT:  Negative for hearing loss.    Respiratory:  Negative for cough.    Cardiovascular:  Negative for chest pain, palpitations and leg swelling.   Gastrointestinal:  Negative for heartburn.   Musculoskeletal:  Negative for myalgias.   Neurological:  Negative for dizziness.   Psychiatric/Behavioral:  Negative for depression and suicidal ideas.        Objective:     Exam:  /70 (BP Location: Left arm, Patient Position: Sitting, BP Cuff Size: Adult)   Pulse 84   Temp 36.6 °C (97.8 °F) (Temporal)   Ht 1.664 m (5' 5.5\")   Wt 84.1 kg (185 lb 6.4 oz)   LMP 12/01/2000   SpO2 93%   BMI 30.38 kg/m²  Body mass index is 30.38 kg/m².    Physical Exam  Constitutional:       Appearance: Normal appearance.   HENT:      Head: Normocephalic and atraumatic.   Eyes:      Pupils: Pupils are equal, round, and reactive to light.   Cardiovascular:      Rate and Rhythm: Normal rate and regular rhythm.      Pulses: Normal pulses.      Heart sounds: Normal heart sounds.   Pulmonary:      Effort: Pulmonary effort is normal.      Breath sounds: Normal breath sounds.   Skin:     General: " Skin is warm and dry.      Capillary Refill: Capillary refill takes less than 2 seconds.   Neurological:      General: No focal deficit present.      Mental Status: She is alert and oriented to person, place, and time.     Assessment & Plan:     55 y.o. female with the following -     Problem List Items Addressed This Visit       Bipolar affective disorder, current episode depressed (HCC)     -states that Remeron 30 mg         Essential tremor     - will continue conservative management.         Facet arthropathy, lumbar     -continue savella and gabapentin.          Relevant Orders    Referral to Pain Management    Fibromyalgia     -states continue current medications.   -states that she is continuing exercise which is going well. Will continue to monitor.   -Referral to pain management.         Relevant Orders    Referral to Pain Management    Pelvic floor dysfunction     - continue pelvic floor PT         Sciatica of right side     - will re-address at next visit.         Relevant Orders    Referral to Pain Management    Stage 3b chronic kidney disease (HCC)     -continue to Dr. Najjar            Return in about 6 months (around 6/19/2023), or if symptoms worsen or fail to improve, for annual wellness.    I have placed the below orders and discussed them with an approved delegating provider. The MA is performing the below orders under the direction of Dr. Duke.     Please note that this dictation was created using voice recognition software. I have made every reasonable attempt to correct obvious errors, but I expect that there are errors of grammar and possibly content that I did not discover before finalizing the note.

## 2022-12-27 ENCOUNTER — OFFICE VISIT (OUTPATIENT)
Dept: FAMILY MEDICINE | Facility: CLINIC | Age: 57
End: 2022-12-27
Payer: COMMERCIAL

## 2022-12-27 VITALS
TEMPERATURE: 100 F | BODY MASS INDEX: 27.94 KG/M2 | SYSTOLIC BLOOD PRESSURE: 130 MMHG | HEART RATE: 106 BPM | HEIGHT: 61 IN | RESPIRATION RATE: 16 BRPM | DIASTOLIC BLOOD PRESSURE: 70 MMHG | OXYGEN SATURATION: 98 % | WEIGHT: 148 LBS

## 2022-12-27 DIAGNOSIS — U07.1 COVID-19 VIRUS INFECTION: Primary | ICD-10-CM

## 2022-12-27 DIAGNOSIS — R05.9 COUGH, UNSPECIFIED TYPE: ICD-10-CM

## 2022-12-27 PROBLEM — J32.9 CHRONIC SINUSITIS: Status: ACTIVE | Noted: 2021-01-25

## 2022-12-27 PROBLEM — K86.89 PANCREATIC INSUFFICIENCY: Status: ACTIVE | Noted: 2022-12-27

## 2022-12-27 PROBLEM — D50.9 IRON DEFICIENCY ANEMIA: Status: ACTIVE | Noted: 2022-12-27

## 2022-12-27 PROBLEM — G89.29 CHRONIC PAIN: Status: ACTIVE | Noted: 2020-06-13

## 2022-12-27 PROBLEM — G25.81 RESTLESS LEGS SYNDROME: Status: ACTIVE | Noted: 2022-12-27

## 2022-12-27 LAB
CTP QC/QA: YES
FLUAV AG NPH QL: NEGATIVE
FLUBV AG NPH QL: NEGATIVE
SARS-COV-2 AG RESP QL IA.RAPID: POSITIVE

## 2022-12-27 PROCEDURE — 3008F BODY MASS INDEX DOCD: CPT | Mod: ,,, | Performed by: NURSE PRACTITIONER

## 2022-12-27 PROCEDURE — 1160F PR REVIEW ALL MEDS BY PRESCRIBER/CLIN PHARMACIST DOCUMENTED: ICD-10-PCS | Mod: ,,, | Performed by: NURSE PRACTITIONER

## 2022-12-27 PROCEDURE — 87428 POCT SARS-COV2 (COVID) WITH FLU ANTIGEN: ICD-10-PCS | Mod: QW,,, | Performed by: NURSE PRACTITIONER

## 2022-12-27 PROCEDURE — 99214 OFFICE O/P EST MOD 30 MIN: CPT | Mod: ,,, | Performed by: NURSE PRACTITIONER

## 2022-12-27 PROCEDURE — 3078F PR MOST RECENT DIASTOLIC BLOOD PRESSURE < 80 MM HG: ICD-10-PCS | Mod: ,,, | Performed by: NURSE PRACTITIONER

## 2022-12-27 PROCEDURE — 1159F MED LIST DOCD IN RCRD: CPT | Mod: ,,, | Performed by: NURSE PRACTITIONER

## 2022-12-27 PROCEDURE — 1160F RVW MEDS BY RX/DR IN RCRD: CPT | Mod: ,,, | Performed by: NURSE PRACTITIONER

## 2022-12-27 PROCEDURE — 87428 SARSCOV & INF VIR A&B AG IA: CPT | Mod: QW,,, | Performed by: NURSE PRACTITIONER

## 2022-12-27 PROCEDURE — 1159F PR MEDICATION LIST DOCUMENTED IN MEDICAL RECORD: ICD-10-PCS | Mod: ,,, | Performed by: NURSE PRACTITIONER

## 2022-12-27 PROCEDURE — 3078F DIAST BP <80 MM HG: CPT | Mod: ,,, | Performed by: NURSE PRACTITIONER

## 2022-12-27 PROCEDURE — 3075F PR MOST RECENT SYSTOLIC BLOOD PRESS GE 130-139MM HG: ICD-10-PCS | Mod: ,,, | Performed by: NURSE PRACTITIONER

## 2022-12-27 PROCEDURE — 99214 PR OFFICE/OUTPT VISIT, EST, LEVL IV, 30-39 MIN: ICD-10-PCS | Mod: ,,, | Performed by: NURSE PRACTITIONER

## 2022-12-27 PROCEDURE — 3075F SYST BP GE 130 - 139MM HG: CPT | Mod: ,,, | Performed by: NURSE PRACTITIONER

## 2022-12-27 PROCEDURE — 3008F PR BODY MASS INDEX (BMI) DOCUMENTED: ICD-10-PCS | Mod: ,,, | Performed by: NURSE PRACTITIONER

## 2022-12-27 RX ORDER — ONDANSETRON 4 MG/1
4 TABLET, FILM COATED ORAL EVERY 6 HOURS PRN
Qty: 20 TABLET | Refills: 0 | Status: SHIPPED | OUTPATIENT
Start: 2022-12-27 | End: 2024-03-25 | Stop reason: SDUPTHER

## 2022-12-27 NOTE — PROGRESS NOTES
[facility]    Chief Complaint      Chief Complaint   Patient presents with    covid test      @ home test pos. Yesterday     Fatigue    Generalized Body Aches    Headache    Nasal Congestion    Cough    Nausea    Emesis    Fever     History of Present Illness      Muna Newberry is a 57 y.o. female with chronic conditions of ITP and osteoporosis who presents today for c/o URI symptoms.    URI   This is a new problem. The problem has been gradually worsening. The maximum temperature recorded prior to her arrival was 101 - 101.9 F. The fever has been present for 1 to 2 days. Associated symptoms include congestion, coughing, diarrhea, ear pain, headaches, nausea, a plugged ear sensation, rhinorrhea, sneezing, a sore throat and vomiting (yesterday). Pertinent negatives include no abdominal pain, chest pain, dysuria, rash, sinus pain or wheezing. She has tried acetaminophen (Dayquil and tylenol arthritis) for the symptoms. The treatment provided mild relief.     Past Medical History:  Past Medical History:   Diagnosis Date    Idiopathic thrombotic thrombocytopenic purpura     Osteoarthrosis     Osteoporosis      Past Surgical History:   has a past surgical history that includes Tonsillectomy; Cholecystectomy; Hysterectomy; Appendectomy; Oophorectomy; and Breast biopsy.    Social History:  Social History     Tobacco Use    Smoking status: Never    Smokeless tobacco: Never   Substance Use Topics    Alcohol use: Never    Drug use: Never     I personally reviewed all past medical, surgical, and social.     Review of Systems   Constitutional:  Positive for chills and malaise/fatigue. Negative for fever.   HENT:  Positive for congestion, ear pain, rhinorrhea, sneezing and sore throat. Negative for ear discharge and sinus pain.    Eyes:  Negative for discharge and redness.   Respiratory:  Positive for cough. Negative for sputum production, shortness of breath and wheezing.    Cardiovascular:  Negative for chest  pain.   Gastrointestinal:  Positive for diarrhea, nausea and vomiting (yesterday). Negative for abdominal pain.   Genitourinary:  Negative for dysuria, frequency and urgency.   Musculoskeletal:  Positive for back pain and myalgias.   Skin:  Negative for itching and rash.   Neurological:  Positive for headaches.   Psychiatric/Behavioral:  Negative for depression and suicidal ideas.       Medications:  Outpatient Encounter Medications as of 2022   Medication Sig Dispense Refill    albuterol (VENTOLIN HFA) 90 mcg/actuation inhaler Inhale 2 puffs into the lungs every 6 (six) hours as needed for Wheezing. Rescue 18 g 5    ergocalciferol (ERGOCALCIFEROL) 50,000 unit Cap Take 1 capsule (50,000 Units total) by mouth twice a week. 8 capsule 5    galcanezumab-gnlm (EMGALITY PEN) 120 mg/mL PnIj Inject 120 mg into the skin every 30 days. 3 each 3    HYDROcodone-acetaminophen (NORCO) 7.5-325 mg per tablet TAKE 1 TABLET BY MOUTH EVERY 12 TO 24 HOURS.      pantoprazole (PROTONIX) 40 MG tablet Take 40 mg by mouth once daily.      promethazine (PHENERGAN) 12.5 MG Tab Take 1 tablet (12.5 mg total) by mouth every 6 (six) hours as needed (nausea assoc. with migraines). 30 tablet 0    RESTASIS 0.05 % ophthalmic emulsion SMARTSI Drop(s) In Eye(s) Every 12 Hours      rimegepant (NURTEC) 75 mg odt Take 1 tablet (75 mg total) by mouth daily as needed for Migraine. Place ODT tablet on the tongue; alternatively the ODT tablet may be placed under the tongue 30 tablet 0    SYNTHROID 75 mcg tablet Take 1 tablet (75 mcg total) by mouth once daily. 30 tablet 5    traMADoL (ULTRAM) 50 mg tablet Take 50 mg by mouth 2 (two) times daily as needed.      atogepant (QULIPTA) 60 mg Tab Take 60 mg by mouth once daily. 90 tablet 3    nirmatrelvir-ritonavir 300 mg (150 mg x 2)-100 mg copackaged tablets (EUA) Take 3 tablets by mouth 2 (two) times daily for 5 days. Each dose contains 2 nirmatrelvir (pink tablets) and 1 ritonavir (white tablet). Take  "all 3 tablets together 30 tablet 0    ondansetron (ZOFRAN) 4 MG tablet Take 1 tablet (4 mg total) by mouth every 6 (six) hours as needed for Nausea. 20 tablet 0    prednisoLONE acetate (PRED FORTE) 1 % DrpS       [DISCONTINUED] ergocalciferol (VITAMIN D2) 50,000 unit Cap Vitamin D (Ergocalciferol) 1.25 MG (14368 UT) Oral Capsule QTY: 24 capsule Days: 90 Refills: 1  Written: 12/14/20 Patient Instructions: Vitamin D2 1,250 mcg (50,000 unit) oral capsule Dispense: 24 - Take 1 capsule twice weekly. capsules (Patient not taking: Reported on 11/14/2022) 24 capsule 0    [DISCONTINUED] ipratropium (ATROVENT) 21 mcg (0.03 %) nasal spray 2 sprays by Nasal route 3 (three) times daily. (Patient not taking: Reported on 11/14/2022) 30 mL 0     No facility-administered encounter medications on file as of 12/27/2022.     Allergies:  Review of patient's allergies indicates:   Allergen Reactions    Phenergan [promethazine]      IV phenergan only    Sulfa (sulfonamide antibiotics) Blisters    Opioids - morphine analogues Rash    Penicillins Rash     Health Maintenance:    There is no immunization history on file for this patient.   Health Maintenance   Topic Date Due    Hepatitis C Screening  Never done    TETANUS VACCINE  Never done    Lipid Panel  06/01/2022    Mammogram  09/12/2023    DEXA Scan  12/30/2023      Physical Exam      Vital Signs  Temp: 100.1 °F (37.8 °C)  Pulse: 106  Resp: 16  SpO2: 98 %  BP: 130/70  Pain Score:   6  Height and Weight  Height: 5' 1" (154.9 cm)  Weight: 67.1 kg (148 lb)  BSA (Calculated - sq m): 1.7 sq meters  BMI (Calculated): 28  Weight in (lb) to have BMI = 25: 132]    Physical Exam  Vitals reviewed.   Constitutional:       General: She is not in acute distress.     Appearance: Normal appearance.   HENT:      Head: Normocephalic and atraumatic.      Right Ear: External ear normal.      Left Ear: External ear normal.      Nose: Congestion present.      Right Turbinates: Swollen.      Left Turbinates: " Swollen.      Mouth/Throat:      Mouth: Mucous membranes are moist.      Pharynx: Posterior oropharyngeal erythema present.   Eyes:      Conjunctiva/sclera: Conjunctivae normal.   Cardiovascular:      Rate and Rhythm: Normal rate and regular rhythm.      Heart sounds: Normal heart sounds.   Pulmonary:      Effort: Pulmonary effort is normal. No respiratory distress.      Breath sounds: Normal breath sounds.   Musculoskeletal:         General: Normal range of motion.      Cervical back: Normal range of motion.   Skin:     General: Skin is warm.   Neurological:      Mental Status: She is alert and oriented to person, place, and time.      Gait: Gait normal.   Psychiatric:         Mood and Affect: Mood normal.         Behavior: Behavior normal.         Thought Content: Thought content normal.         Judgment: Judgment normal.        Laboratory:  CBC:  Recent Labs   Lab 06/01/21  1400 11/15/21  1614 06/16/22  1432   WBC 5.02 7.54 6.59   RBC 4.54 4.66 4.72   Hemoglobin 12.7 13.2 14.1   Hematocrit 40.6 40.1 42.5   Platelet Count 249 267 277   MCV 89.4 86.1 90.0   MCH 28.0 28.3 29.9   MCHC 31.3 L 32.9 33.2     CMP:  Recent Labs   Lab 04/21/21  1627 06/01/21  1400 11/15/21  1614 06/16/22  1432   Glucose 77 87   < > 79   Calcium 8.7 8.6   < > 8.9   Albumin 4.0 3.9  --  4.0   Total Protein 6.9 7.1  --  6.7   Sodium 144 141   < > 141   Potassium 4.0 3.9   < > 4.2   CO2 30 26   < > 28   Chloride 110 H 108 H   < > 106   BUN 9 9   < > 9   Alk Phos 69 59  --  97   ALT 21 31  --  27   AST 13 L 22  --  16   Bilirubin, Total 0.4 0.3  --  0.4    < > = values in this interval not displayed.     LIPIDS:  Recent Labs   Lab 06/01/21  1400 11/15/21  1614 06/16/22  1432   TSH 0.839 2.050 1.640   HDL Cholesterol 56  --   --    Cholesterol 221 H  --   --    Triglycerides 119  --   --    LDL Calculated 141  --   --    Cholesterol/HDL Ratio (Risk Factor) 3.9  --   --    Non-  --   --      TSH:  Recent Labs   Lab 06/01/21  1400  11/15/21  1614 06/16/22  1432   TSH 0.839 2.050 1.640     A1C:  Recent Labs   Lab 06/01/21  1400   Hemoglobin A1C 5.3     Assessment/Plan     Muna Newberry is a 57 y.o.female with:    1. Cough, unspecified type  - POCT SARS-COV2 (COVID) with Flu Antigen    2. COVID-19 virus infection  - ondansetron (ZOFRAN) 4 MG tablet; Take 1 tablet (4 mg total) by mouth every 6 (six) hours as needed for Nausea.  Dispense: 20 tablet; Refill: 0  - nirmatrelvir-ritonavir 300 mg (150 mg x 2)-100 mg copackaged tablets (EUA); Take 3 tablets by mouth 2 (two) times daily for 5 days. Each dose contains 2 nirmatrelvir (pink tablets) and 1 ritonavir (white tablet). Take all 3 tablets together  Dispense: 30 tablet; Refill: 0   -OTC tylenol/ibuprofen as needed for temperature 100.4 or greater/body aches.     Chronic conditions status updated as per HPI.  Other than changes above, cont current medications and maintain follow up with specialists.  Return to clinic as needed.     Pili Newberry, GRETCHENP  [facility]

## 2023-03-03 DIAGNOSIS — M79.642 LEFT HAND PAIN: Primary | ICD-10-CM

## 2023-03-03 DIAGNOSIS — M25.561 RIGHT KNEE PAIN, UNSPECIFIED CHRONICITY: ICD-10-CM

## 2023-03-06 ENCOUNTER — OFFICE VISIT (OUTPATIENT)
Dept: ORTHOPEDICS | Facility: CLINIC | Age: 58
End: 2023-03-06
Payer: OTHER MISCELLANEOUS

## 2023-03-06 ENCOUNTER — HOSPITAL ENCOUNTER (OUTPATIENT)
Dept: RADIOLOGY | Facility: HOSPITAL | Age: 58
Discharge: HOME | End: 2023-03-06
Attending: ORTHOPAEDIC SURGERY
Payer: OTHER MISCELLANEOUS

## 2023-03-06 DIAGNOSIS — M25.561 RIGHT KNEE PAIN, UNSPECIFIED CHRONICITY: ICD-10-CM

## 2023-03-06 DIAGNOSIS — S60.222A CONTUSION OF LEFT HAND, INITIAL ENCOUNTER: Primary | ICD-10-CM

## 2023-03-06 DIAGNOSIS — S80.01XA CONTUSION OF RIGHT KNEE, INITIAL ENCOUNTER: ICD-10-CM

## 2023-03-06 DIAGNOSIS — M79.642 LEFT HAND PAIN: ICD-10-CM

## 2023-03-06 PROCEDURE — 73562 XR KNEE AP LAT WITH SUNRISE RIGHT: ICD-10-PCS | Mod: 26,RT,, | Performed by: ORTHOPAEDIC SURGERY

## 2023-03-06 PROCEDURE — 73130 X-RAY EXAM OF HAND: CPT | Mod: 26,LT,, | Performed by: ORTHOPAEDIC SURGERY

## 2023-03-06 PROCEDURE — 73562 X-RAY EXAM OF KNEE 3: CPT | Mod: 26,RT,, | Performed by: ORTHOPAEDIC SURGERY

## 2023-03-06 PROCEDURE — 99214 PR OFFICE/OUTPT VISIT, EST, LEVL IV, 30-39 MIN: ICD-10-PCS | Mod: S$PBB,,, | Performed by: ORTHOPAEDIC SURGERY

## 2023-03-06 PROCEDURE — 99213 OFFICE O/P EST LOW 20 MIN: CPT | Mod: PBBFAC | Performed by: ORTHOPAEDIC SURGERY

## 2023-03-06 PROCEDURE — 99214 OFFICE O/P EST MOD 30 MIN: CPT | Mod: S$PBB,,, | Performed by: ORTHOPAEDIC SURGERY

## 2023-03-06 PROCEDURE — 73130 X-RAY EXAM OF HAND: CPT | Mod: TC,LT

## 2023-03-06 PROCEDURE — 73130 XR HAND COMPLETE 3 VIEW LEFT: ICD-10-PCS | Mod: 26,LT,, | Performed by: ORTHOPAEDIC SURGERY

## 2023-03-06 PROCEDURE — 73562 X-RAY EXAM OF KNEE 3: CPT | Mod: TC,RT

## 2023-03-06 NOTE — PROGRESS NOTES
CLINIC NOTE       Chief Complaint   Patient presents with    Right Knee - Pain    Left Hand - Pain        Muna Newberry is a 57 y.o. female seen today for evaluation of left hand and right knee pain.  She reportedly inadvertently ran into by a  with a flat metal cart on 02/16/2023.  She was knocked to the ground.  She attempted to break her fall on outstretched left palm.  She initially had discomfort involving the index and long fingers.  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  She landed on the anterior aspect of her right knee.  She showed me a picture on her phone documenting significant bruising involving the proximal tibial region and especially overlying the pes anserine bursa.  She has some lingering pain in the area of the pes anserine bursa with stair negotiation.  X-rays left hand today 03/06/2023 three views AP lateral oblique views of the bones well mineralized.  Carpus normally aligned.  There is moderate 1st CMC joint DJD.  No evidence of acute fracture, dislocation or pathologic bone.  X-rays right knee today 03/06/2023 three views AP lateral and patellar views:  Bones well mineralized.  There is minimal narrowing of the medial and lateral joint spaces with hint of tiny early osteophyte formation involving the distal femur  Past Medical History:   Diagnosis Date    Idiopathic thrombotic thrombocytopenic purpura     Osteoarthrosis     Osteoporosis      Family History   Problem Relation Age of Onset    Heart disease Mother     Leukemia Father     Breast cancer Paternal Aunt      Current Outpatient Medications on File Prior to Visit   Medication Sig Dispense Refill    albuterol (VENTOLIN HFA) 90 mcg/actuation inhaler Inhale 2 puffs into the lungs every 6 (six) hours as needed for Wheezing. Rescue 18 g 5    atogepant (QULIPTA) 60 mg Tab Take 60 mg by mouth once daily. 90 tablet 3    ergocalciferol (ERGOCALCIFEROL) 50,000 unit Cap Take 1 capsule (50,000 Units  total) by mouth twice a week. 8 capsule 5    galcanezumab-gnlm (EMGALITY PEN) 120 mg/mL PnIj Inject 120 mg into the skin every 30 days. 3 each 3    HYDROcodone-acetaminophen (NORCO) 7.5-325 mg per tablet TAKE 1 TABLET BY MOUTH EVERY 12 TO 24 HOURS.      ondansetron (ZOFRAN) 4 MG tablet Take 1 tablet (4 mg total) by mouth every 6 (six) hours as needed for Nausea. 20 tablet 0    pantoprazole (PROTONIX) 40 MG tablet Take 40 mg by mouth once daily.      prednisoLONE acetate (PRED FORTE) 1 % DrpS       promethazine (PHENERGAN) 12.5 MG Tab Take 1 tablet (12.5 mg total) by mouth every 6 (six) hours as needed (nausea assoc. with migraines). 30 tablet 0    RESTASIS 0.05 % ophthalmic emulsion SMARTSI Drop(s) In Eye(s) Every 12 Hours      rimegepant (NURTEC) 75 mg odt Take 1 tablet (75 mg total) by mouth daily as needed for Migraine. Place ODT tablet on the tongue; alternatively the ODT tablet may be placed under the tongue 30 tablet 0    SYNTHROID 75 mcg tablet Take 1 tablet (75 mcg total) by mouth once daily. 30 tablet 5    traMADoL (ULTRAM) 50 mg tablet Take 50 mg by mouth 2 (two) times daily as needed.       No current facility-administered medications on file prior to visit.       ROS     There were no vitals filed for this visit.    Past Surgical History:   Procedure Laterality Date    APPENDECTOMY      BREAST BIOPSY      CHOLECYSTECTOMY      HYSTERECTOMY      OOPHORECTOMY      TONSILLECTOMY          Review of patient's allergies indicates:   Allergen Reactions    Phenergan [promethazine]      IV phenergan only    Sulfa (sulfonamide antibiotics) Blisters    Opioids - morphine analogues Rash    Penicillins Rash        Ortho Exam :  Well-developed well-nourished  female no acute distress.  She is alert oriented cooperative.  Neck is supple without JVD.  Breathing is regular nonlabored.  Skin is warm dry no lesions seen.  Exam left hand is normal contour.  She has mild tenderness to palpation over the basilar  joint.  Exam of the fully flex and extend the thumb and lesser fingers.  No instability signs.  No residual soft tissue swelling or ecchymosis.  No triggering of the fingers or thumb.  Exam of the right knee shows no sign of intra-articular effusion.  Quadriceps and patellar tendon mechanism are intact.  She is able to fully extend her knee against gravity and resistance.  There is no tenderness palpation along the course of the patellar tendon.  There is some mild residual soft tissue swelling and tenderness palpation over the pes anserine bursal region skin changes suggesting resolving contusion.      Assessment and Plan  Patient Active Problem List    Diagnosis Date Noted    Restless legs syndrome 12/27/2022    Pancreatic insufficiency 12/27/2022    Iron deficiency anemia 12/27/2022    Chronic migraine 12/27/2022    Intractable migraine with aura with status migrainosus 09/14/2022    Herpes zoster with complication 12/20/2021    Herpes zoster without complication 12/20/2021    Primary osteoarthritis of left hand 12/06/2021    Hypothyroidism 04/21/2021    Osteoporosis 04/21/2021    Vitamin D deficiency 04/21/2021    Asthma 04/21/2021    Arthritis 04/21/2021    Chronic sinusitis 01/25/2021    Chronic pain 06/13/2020    Impression:  Contusion left hand and right knee (upper tibial region)  Plan:  1. Mobic 15 mg p.o. q.d. p.r.n. 2. Ice therapy 3. Voltaren gel before slow progression of activities.  Recheck as needed                                  Radiology Interpretation        Patient Name: Muna Newberry  Date: 3/6/2023  YOB: 1965  MRN# [id number]        ORDERING DIAGNOSIS:  No diagnosis found.          X-rays left hand today 03/06/2023 three views AP lateral oblique views of the bones well mineralized.  Carpus normally aligned.  There is moderate 1st CMC joint DJD.  No evidence of acute fracture, dislocation or pathologic bone.  X-rays right knee today 03/06/2023 three views AP lateral and  patellar views:  Bones well mineralized.  There is minimal narrowing of the medial and lateral joint spaces with hint of tiny early osteophyte formation involving the distal femur             MD Salomon Howard M.D.

## 2023-03-20 ENCOUNTER — OFFICE VISIT (OUTPATIENT)
Dept: FAMILY MEDICINE | Facility: CLINIC | Age: 58
End: 2023-03-20
Payer: COMMERCIAL

## 2023-03-20 VITALS
DIASTOLIC BLOOD PRESSURE: 72 MMHG | HEART RATE: 66 BPM | TEMPERATURE: 98 F | BODY MASS INDEX: 28.34 KG/M2 | WEIGHT: 150 LBS | SYSTOLIC BLOOD PRESSURE: 128 MMHG | OXYGEN SATURATION: 99 %

## 2023-03-20 DIAGNOSIS — E03.8 OTHER SPECIFIED HYPOTHYROIDISM: Primary | ICD-10-CM

## 2023-03-20 DIAGNOSIS — D50.8 OTHER IRON DEFICIENCY ANEMIA: ICD-10-CM

## 2023-03-20 LAB
ALBUMIN SERPL BCP-MCNC: 3.7 G/DL (ref 3.5–5)
ALBUMIN/GLOB SERPL: 1.3 {RATIO}
ALP SERPL-CCNC: 67 U/L (ref 46–118)
ALT SERPL W P-5'-P-CCNC: 17 U/L (ref 13–56)
ANION GAP SERPL CALCULATED.3IONS-SCNC: 9 MMOL/L (ref 7–16)
AST SERPL W P-5'-P-CCNC: 14 U/L (ref 15–37)
BASOPHILS # BLD AUTO: 0.03 K/UL (ref 0–0.2)
BASOPHILS NFR BLD AUTO: 0.4 % (ref 0–1)
BILIRUB SERPL-MCNC: 0.3 MG/DL (ref ?–1.2)
BUN SERPL-MCNC: 10 MG/DL (ref 7–18)
BUN/CREAT SERPL: 18 (ref 6–20)
CALCIUM SERPL-MCNC: 9 MG/DL (ref 8.5–10.1)
CHLORIDE SERPL-SCNC: 108 MMOL/L (ref 98–107)
CO2 SERPL-SCNC: 29 MMOL/L (ref 21–32)
CREAT SERPL-MCNC: 0.57 MG/DL (ref 0.55–1.02)
DIFFERENTIAL METHOD BLD: ABNORMAL
EGFR (NO RACE VARIABLE) (RUSH/TITUS): 105 ML/MIN/1.73M²
EOSINOPHIL # BLD AUTO: 0.21 K/UL (ref 0–0.5)
EOSINOPHIL NFR BLD AUTO: 2.7 % (ref 1–4)
ERYTHROCYTE [DISTWIDTH] IN BLOOD BY AUTOMATED COUNT: 12.6 % (ref 11.5–14.5)
FERRITIN SERPL-MCNC: 261 NG/ML (ref 8–252)
GLOBULIN SER-MCNC: 2.8 G/DL (ref 2–4)
GLUCOSE SERPL-MCNC: 78 MG/DL (ref 74–106)
HCT VFR BLD AUTO: 42 % (ref 38–47)
HGB BLD-MCNC: 13.7 G/DL (ref 12–16)
IMM GRANULOCYTES # BLD AUTO: 0.02 K/UL (ref 0–0.04)
IMM GRANULOCYTES NFR BLD: 0.3 % (ref 0–0.4)
IRON SATN MFR SERPL: 28 % (ref 14–50)
IRON SERPL-MCNC: 69 ΜG/DL (ref 50–170)
LYMPHOCYTES # BLD AUTO: 2.06 K/UL (ref 1–4.8)
LYMPHOCYTES NFR BLD AUTO: 26.8 % (ref 27–41)
MCH RBC QN AUTO: 29.6 PG (ref 27–31)
MCHC RBC AUTO-ENTMCNC: 32.6 G/DL (ref 32–36)
MCV RBC AUTO: 90.7 FL (ref 80–96)
MONOCYTES # BLD AUTO: 0.51 K/UL (ref 0–0.8)
MONOCYTES NFR BLD AUTO: 6.6 % (ref 2–6)
MPC BLD CALC-MCNC: 10.4 FL (ref 9.4–12.4)
NEUTROPHILS # BLD AUTO: 4.86 K/UL (ref 1.8–7.7)
NEUTROPHILS NFR BLD AUTO: 63.2 % (ref 53–65)
NRBC # BLD AUTO: 0 X10E3/UL
NRBC, AUTO (.00): 0 %
PLATELET # BLD AUTO: 249 K/UL (ref 150–400)
POTASSIUM SERPL-SCNC: 4.5 MMOL/L (ref 3.5–5.1)
PROT SERPL-MCNC: 6.5 G/DL (ref 6.4–8.2)
RBC # BLD AUTO: 4.63 M/UL (ref 4.2–5.4)
SODIUM SERPL-SCNC: 141 MMOL/L (ref 136–145)
TIBC SERPL-MCNC: 243 ΜG/DL (ref 250–450)
TSH SERPL DL<=0.005 MIU/L-ACNC: 3.24 UIU/ML (ref 0.36–3.74)
VIT B12 SERPL-MCNC: 269 PG/ML (ref 193–986)
WBC # BLD AUTO: 7.69 K/UL (ref 4.5–11)

## 2023-03-20 PROCEDURE — 1159F MED LIST DOCD IN RCRD: CPT | Mod: ,,, | Performed by: NURSE PRACTITIONER

## 2023-03-20 PROCEDURE — 83550 IRON BINDING TEST: CPT | Mod: ,,, | Performed by: CLINICAL MEDICAL LABORATORY

## 2023-03-20 PROCEDURE — 83550 IRON AND TIBC: ICD-10-PCS | Mod: ,,, | Performed by: CLINICAL MEDICAL LABORATORY

## 2023-03-20 PROCEDURE — 3078F PR MOST RECENT DIASTOLIC BLOOD PRESSURE < 80 MM HG: ICD-10-PCS | Mod: ,,, | Performed by: NURSE PRACTITIONER

## 2023-03-20 PROCEDURE — 82728 FERRITIN: ICD-10-PCS | Mod: ,,, | Performed by: CLINICAL MEDICAL LABORATORY

## 2023-03-20 PROCEDURE — 1160F PR REVIEW ALL MEDS BY PRESCRIBER/CLIN PHARMACIST DOCUMENTED: ICD-10-PCS | Mod: ,,, | Performed by: NURSE PRACTITIONER

## 2023-03-20 PROCEDURE — 82728 ASSAY OF FERRITIN: CPT | Mod: ,,, | Performed by: CLINICAL MEDICAL LABORATORY

## 2023-03-20 PROCEDURE — 3008F BODY MASS INDEX DOCD: CPT | Mod: ,,, | Performed by: NURSE PRACTITIONER

## 2023-03-20 PROCEDURE — 3008F PR BODY MASS INDEX (BMI) DOCUMENTED: ICD-10-PCS | Mod: ,,, | Performed by: NURSE PRACTITIONER

## 2023-03-20 PROCEDURE — 3074F PR MOST RECENT SYSTOLIC BLOOD PRESSURE < 130 MM HG: ICD-10-PCS | Mod: ,,, | Performed by: NURSE PRACTITIONER

## 2023-03-20 PROCEDURE — 99214 PR OFFICE/OUTPT VISIT, EST, LEVL IV, 30-39 MIN: ICD-10-PCS | Mod: ,,, | Performed by: NURSE PRACTITIONER

## 2023-03-20 PROCEDURE — 83540 IRON AND TIBC: ICD-10-PCS | Mod: ,,, | Performed by: CLINICAL MEDICAL LABORATORY

## 2023-03-20 PROCEDURE — 1159F PR MEDICATION LIST DOCUMENTED IN MEDICAL RECORD: ICD-10-PCS | Mod: ,,, | Performed by: NURSE PRACTITIONER

## 2023-03-20 PROCEDURE — 3078F DIAST BP <80 MM HG: CPT | Mod: ,,, | Performed by: NURSE PRACTITIONER

## 2023-03-20 PROCEDURE — 82607 VITAMIN B12: ICD-10-PCS | Mod: ,,, | Performed by: CLINICAL MEDICAL LABORATORY

## 2023-03-20 PROCEDURE — 3074F SYST BP LT 130 MM HG: CPT | Mod: ,,, | Performed by: NURSE PRACTITIONER

## 2023-03-20 PROCEDURE — 1160F RVW MEDS BY RX/DR IN RCRD: CPT | Mod: ,,, | Performed by: NURSE PRACTITIONER

## 2023-03-20 PROCEDURE — 83540 ASSAY OF IRON: CPT | Mod: ,,, | Performed by: CLINICAL MEDICAL LABORATORY

## 2023-03-20 PROCEDURE — 80050 PR  GENERAL HEALTH PANEL: ICD-10-PCS | Mod: ,,, | Performed by: CLINICAL MEDICAL LABORATORY

## 2023-03-20 PROCEDURE — 82607 VITAMIN B-12: CPT | Mod: ,,, | Performed by: CLINICAL MEDICAL LABORATORY

## 2023-03-20 PROCEDURE — 80050 GENERAL HEALTH PANEL: CPT | Mod: ,,, | Performed by: CLINICAL MEDICAL LABORATORY

## 2023-03-20 PROCEDURE — 99214 OFFICE O/P EST MOD 30 MIN: CPT | Mod: ,,, | Performed by: NURSE PRACTITIONER

## 2023-03-20 RX ORDER — LEVOTHYROXINE SODIUM 75 UG/1
75 TABLET ORAL DAILY
Qty: 30 TABLET | Refills: 5 | Status: SHIPPED | OUTPATIENT
Start: 2023-03-20 | End: 2023-10-03 | Stop reason: DRUGHIGH

## 2023-03-20 NOTE — PROGRESS NOTES
[facility]    Chief Complaint      Chief Complaint   Patient presents with    Medication Refill     PT ALSO STATED THAT SHE THINKS SHE NEEDS LABS      History of Present Illness      Muna Newberry is a 57 y.o. female with chronic conditions of hypothyroidism, anemia and migraine headaches who presents today for medication refills. She is requesting lab work with an iron panel.     Past Medical History:  Past Medical History:   Diagnosis Date    Idiopathic thrombotic thrombocytopenic purpura     Osteoarthrosis     Osteoporosis      Past Surgical History:   has a past surgical history that includes Tonsillectomy; Cholecystectomy; Hysterectomy; Appendectomy; Oophorectomy; and Breast biopsy.    Social History:  Social History     Tobacco Use    Smoking status: Never    Smokeless tobacco: Never   Substance Use Topics    Alcohol use: Never    Drug use: Never     I personally reviewed all past medical, surgical, and social.     Review of Systems   Constitutional: Negative.    HENT: Negative.     Respiratory: Negative.     Cardiovascular: Negative.    Musculoskeletal: Negative.    Neurological: Negative.    Endo/Heme/Allergies: Negative.       Medications:  Outpatient Encounter Medications as of 3/20/2023   Medication Sig Dispense Refill    albuterol (VENTOLIN HFA) 90 mcg/actuation inhaler Inhale 2 puffs into the lungs every 6 (six) hours as needed for Wheezing. Rescue 18 g 5    atogepant (QULIPTA) 60 mg Tab Take 60 mg by mouth once daily. 90 tablet 3    ergocalciferol (ERGOCALCIFEROL) 50,000 unit Cap Take 1 capsule (50,000 Units total) by mouth twice a week. 8 capsule 5    galcanezumab-gnlm (EMGALITY PEN) 120 mg/mL PnIj Inject 120 mg into the skin every 30 days. 3 each 3    HYDROcodone-acetaminophen (NORCO) 7.5-325 mg per tablet TAKE 1 TABLET BY MOUTH EVERY 12 TO 24 HOURS.      ondansetron (ZOFRAN) 4 MG tablet Take 1 tablet (4 mg total) by mouth every 6 (six) hours as needed for Nausea. 20 tablet 0     pantoprazole (PROTONIX) 40 MG tablet Take 40 mg by mouth once daily.      prednisoLONE acetate (PRED FORTE) 1 % DrpS       promethazine (PHENERGAN) 12.5 MG Tab Take 1 tablet (12.5 mg total) by mouth every 6 (six) hours as needed (nausea assoc. with migraines). 30 tablet 0    RESTASIS 0.05 % ophthalmic emulsion SMARTSI Drop(s) In Eye(s) Every 12 Hours      rimegepant (NURTEC) 75 mg odt Take 1 tablet (75 mg total) by mouth daily as needed for Migraine. Place ODT tablet on the tongue; alternatively the ODT tablet may be placed under the tongue 30 tablet 0    traMADoL (ULTRAM) 50 mg tablet Take 50 mg by mouth 2 (two) times daily as needed.      [DISCONTINUED] SYNTHROID 75 mcg tablet Take 1 tablet (75 mcg total) by mouth once daily. 30 tablet 5    SYNTHROID 75 mcg tablet Take 1 tablet (75 mcg total) by mouth once daily. 30 tablet 5     No facility-administered encounter medications on file as of 3/20/2023.     Allergies:  Review of patient's allergies indicates:   Allergen Reactions    Phenergan [promethazine]      IV phenergan only    Sulfa (sulfonamide antibiotics) Blisters    Opioids - morphine analogues Rash    Penicillins Rash     Health Maintenance:  Immunization History   Administered Date(s) Administered    Influenza 10/17/2003      Health Maintenance   Topic Date Due    Hepatitis C Screening  Never done    TETANUS VACCINE  Never done    Lipid Panel  2022    Mammogram  2023    DEXA Scan  2023      Physical Exam      Vital Signs  Temp: 98 °F (36.7 °C)  Pulse: 66  SpO2: 99 %  BP: 128/72  Pain Score:   4  Pain Loc: Back  Height and Weight  Weight: 68 kg (150 lb)]    Physical Exam  Vitals reviewed.   Constitutional:       Appearance: Normal appearance.   HENT:      Head: Normocephalic.      Nose: Nose normal.      Mouth/Throat:      Mouth: Mucous membranes are moist.   Eyes:      Conjunctiva/sclera: Conjunctivae normal.   Cardiovascular:      Rate and Rhythm: Normal rate and regular rhythm.       Heart sounds: Normal heart sounds. No murmur heard.  Musculoskeletal:      Cervical back: Normal range of motion.   Skin:     General: Skin is warm.   Neurological:      Mental Status: She is alert and oriented to person, place, and time.   Psychiatric:         Mood and Affect: Mood normal.         Behavior: Behavior normal.         Thought Content: Thought content normal.         Judgment: Judgment normal.      Laboratory:  CBC:  Recent Labs   Lab 11/15/21  1614 06/16/22  1432 03/20/23  1803   WBC 7.54 6.59 7.69   RBC 4.66 4.72 4.63   Hemoglobin 13.2 14.1 13.7   Hematocrit 40.1 42.5 42.0   Platelet Count 267 277 249   MCV 86.1 90.0 90.7   MCH 28.3 29.9 29.6   MCHC 32.9 33.2 32.6     CMP:  Recent Labs   Lab 06/01/21  1400 11/15/21  1614 06/16/22  1432 03/20/23  1803   Glucose 87   < > 79 78   Calcium 8.6   < > 8.9 9.0   Albumin 3.9  --  4.0 3.7   Total Protein 7.1  --  6.7 6.5   Sodium 141   < > 141 141   Potassium 3.9   < > 4.2 4.5   CO2 26   < > 28 29   Chloride 108 H   < > 106 108 H   BUN 9   < > 9 10   Alk Phos 59  --  97 67   ALT 31  --  27 17   AST 22  --  16 14 L   Bilirubin, Total 0.3  --  0.4 0.3    < > = values in this interval not displayed.     LIPIDS:  Recent Labs   Lab 06/01/21  1400 11/15/21  1614 06/16/22  1432 03/20/23  1803   TSH 0.839 2.050 1.640 3.240   HDL Cholesterol 56  --   --   --    Cholesterol 221 H  --   --   --    Triglycerides 119  --   --   --    LDL Calculated 141  --   --   --    Cholesterol/HDL Ratio (Risk Factor) 3.9  --   --   --    Non-  --   --   --      TSH:  Recent Labs   Lab 11/15/21  1614 06/16/22  1432 03/20/23  1803   TSH 2.050 1.640 3.240     A1C:  Recent Labs   Lab 06/01/21  1400   Hemoglobin A1C 5.3     Assessment/Plan     Problem List Items Addressed This Visit          Oncology    Iron deficiency anemia     Lab Results   Component Value Date    IRON 69 03/20/2023    TIBC 243 (L) 03/20/2023    LABIRON 28 03/20/2023      Lab Results   Component Value Date    WBC  7.69 03/20/2023    HGB 13.7 03/20/2023    HCT 42.0 03/20/2023    MCV 90.7 03/20/2023     03/20/2023   Eat a well-balanced diet that includes iron rich foods.  Maintain a healthy weight          Relevant Orders    CBC Auto Differential (Completed)    Iron and TIBC (Completed)    Vitamin B12 (Completed)    Ferritin (Completed)       Endocrine    Hypothyroidism - Primary     Lab Results   Component Value Date    TSH 3.240 03/20/2023     Lab Results   Component Value Date    WBC 7.69 03/20/2023    HGB 13.7 03/20/2023    HCT 42.0 03/20/2023    MCV 90.7 03/20/2023     03/20/2023   Optimally controlled with Synthroid 75 mg daily.   Take Synthroid on an empty stomach QAM and wait at least an hour to eat or drink coffee.  Repeat TSH annually.          Relevant Medications    SYNTHROID 75 mcg tablet    Other Relevant Orders    CBC Auto Differential (Completed)    Comprehensive Metabolic Panel (Completed)    Iron and TIBC (Completed)    Vitamin B12 (Completed)    TSH (Completed)        Muna Newberry is a 57 y.o.female with:    1. Other specified hypothyroidism  - CBC Auto Differential; Future  - Comprehensive Metabolic Panel; Future  - Iron and TIBC; Future  - Vitamin B12; Future  - TSH; Future  - SYNTHROID 75 mcg tablet; Take 1 tablet (75 mcg total) by mouth once daily.  Dispense: 30 tablet; Refill: 5  - CBC Auto Differential  - Comprehensive Metabolic Panel  - Iron and TIBC  - Vitamin B12  - TSH    2. Other iron deficiency anemia  - CBC Auto Differential; Future  - Iron and TIBC; Future  - Vitamin B12; Future  - CBC Auto Differential  - Iron and TIBC  - Vitamin B12  - Ferritin; Future  - Ferritin     Chronic conditions status updated as per HPI.  Other than changes above, cont current medications and maintain follow up with specialists.  Return to clinic in 3 months for scheduled [facility] with JOYCE Warner.    Pili Newberry, P  [facility]

## 2023-03-25 NOTE — ASSESSMENT & PLAN NOTE
Lab Results   Component Value Date    TSH 3.240 03/20/2023     Lab Results   Component Value Date    WBC 7.69 03/20/2023    HGB 13.7 03/20/2023    HCT 42.0 03/20/2023    MCV 90.7 03/20/2023     03/20/2023     Optimally controlled with Synthroid 75 mg daily.   Take Synthroid on an empty stomach QAM and wait at least an hour to eat or drink coffee.  Repeat TSH annually.

## 2023-03-25 NOTE — ASSESSMENT & PLAN NOTE
Lab Results   Component Value Date    IRON 69 03/20/2023    TIBC 243 (L) 03/20/2023    LABIRON 28 03/20/2023      Lab Results   Component Value Date    WBC 7.69 03/20/2023    HGB 13.7 03/20/2023    HCT 42.0 03/20/2023    MCV 90.7 03/20/2023     03/20/2023     Eat a well-balanced diet that includes iron rich foods.  Maintain a healthy weight

## 2023-06-01 ENCOUNTER — OFFICE VISIT (OUTPATIENT)
Dept: NEUROLOGY | Facility: CLINIC | Age: 58
End: 2023-06-01
Payer: COMMERCIAL

## 2023-06-01 VITALS
HEIGHT: 61 IN | DIASTOLIC BLOOD PRESSURE: 72 MMHG | WEIGHT: 148 LBS | SYSTOLIC BLOOD PRESSURE: 112 MMHG | OXYGEN SATURATION: 99 % | HEART RATE: 84 BPM | BODY MASS INDEX: 27.94 KG/M2 | RESPIRATION RATE: 18 BRPM

## 2023-06-01 DIAGNOSIS — M54.81 OCCIPITAL NEURALGIA OF LEFT SIDE: Primary | ICD-10-CM

## 2023-06-01 DIAGNOSIS — G43.E09 CHRONIC MIGRAINE WITH AURA: ICD-10-CM

## 2023-06-01 PROCEDURE — 1159F MED LIST DOCD IN RCRD: CPT | Mod: ,,, | Performed by: NURSE PRACTITIONER

## 2023-06-01 PROCEDURE — 3008F BODY MASS INDEX DOCD: CPT | Mod: ,,, | Performed by: NURSE PRACTITIONER

## 2023-06-01 PROCEDURE — 1160F PR REVIEW ALL MEDS BY PRESCRIBER/CLIN PHARMACIST DOCUMENTED: ICD-10-PCS | Mod: ,,, | Performed by: NURSE PRACTITIONER

## 2023-06-01 PROCEDURE — 1160F RVW MEDS BY RX/DR IN RCRD: CPT | Mod: ,,, | Performed by: NURSE PRACTITIONER

## 2023-06-01 PROCEDURE — 1159F PR MEDICATION LIST DOCUMENTED IN MEDICAL RECORD: ICD-10-PCS | Mod: ,,, | Performed by: NURSE PRACTITIONER

## 2023-06-01 PROCEDURE — 99213 PR OFFICE/OUTPT VISIT, EST, LEVL III, 20-29 MIN: ICD-10-PCS | Mod: S$PBB,,, | Performed by: NURSE PRACTITIONER

## 2023-06-01 PROCEDURE — 99213 OFFICE O/P EST LOW 20 MIN: CPT | Mod: S$PBB,,, | Performed by: NURSE PRACTITIONER

## 2023-06-01 PROCEDURE — 3078F PR MOST RECENT DIASTOLIC BLOOD PRESSURE < 80 MM HG: ICD-10-PCS | Mod: ,,, | Performed by: NURSE PRACTITIONER

## 2023-06-01 PROCEDURE — 3074F SYST BP LT 130 MM HG: CPT | Mod: ,,, | Performed by: NURSE PRACTITIONER

## 2023-06-01 PROCEDURE — 3074F PR MOST RECENT SYSTOLIC BLOOD PRESSURE < 130 MM HG: ICD-10-PCS | Mod: ,,, | Performed by: NURSE PRACTITIONER

## 2023-06-01 PROCEDURE — 99215 OFFICE O/P EST HI 40 MIN: CPT | Mod: PBBFAC | Performed by: NURSE PRACTITIONER

## 2023-06-01 PROCEDURE — 3078F DIAST BP <80 MM HG: CPT | Mod: ,,, | Performed by: NURSE PRACTITIONER

## 2023-06-01 PROCEDURE — 3008F PR BODY MASS INDEX (BMI) DOCUMENTED: ICD-10-PCS | Mod: ,,, | Performed by: NURSE PRACTITIONER

## 2023-06-01 RX ORDER — CYANOCOBALAMIN 1000 UG/ML
1000 INJECTION, SOLUTION INTRAMUSCULAR; SUBCUTANEOUS
COMMUNITY

## 2023-06-01 NOTE — PATIENT INSTRUCTIONS
Continue Nurtec as needed for headache, will use [facility]  Will refer to [facility] neurosurgery for 2nd opinion on treatment options  Continue followup with Dr. Yanez

## 2023-06-01 NOTE — PROGRESS NOTES
Subjective:       Patient ID: Muna Newberry is a 57 y.o. female     Chief Complaint:    Chief Complaint   Patient presents with    Follow-up    Migraine        Allergies:  Phenergan [promethazine], Sulfa (sulfonamide antibiotics), Opioids - morphine analogues, and Penicillins    Current Medications:    Outpatient Encounter Medications as of 2023   Medication Sig Dispense Refill    albuterol (VENTOLIN HFA) 90 mcg/actuation inhaler Inhale 2 puffs into the lungs every 6 (six) hours as needed for Wheezing. Rescue 18 g 5    cyanocobalamin 1,000 mcg/mL injection 1,000 mcg every 28 days.      ergocalciferol (ERGOCALCIFEROL) 50,000 unit Cap Take 1 capsule (50,000 Units total) by mouth twice a week. 8 capsule 5    galcanezumab-gnlm (EMGALITY PEN) 120 mg/mL PnIj Inject 120 mg into the skin every 30 days. 3 each 3    HYDROcodone-acetaminophen (NORCO) 7.5-325 mg per tablet TAKE 1 TABLET BY MOUTH EVERY 12 TO 24 HOURS.      ondansetron (ZOFRAN) 4 MG tablet Take 1 tablet (4 mg total) by mouth every 6 (six) hours as needed for Nausea. 20 tablet 0    promethazine (PHENERGAN) 12.5 MG Tab Take 1 tablet (12.5 mg total) by mouth every 6 (six) hours as needed (nausea assoc. with migraines). 30 tablet 0    RESTASIS 0.05 % ophthalmic emulsion SMARTSI Drop(s) In Eye(s) Every 12 Hours      rimegepant (NURTEC) 75 mg odt Take 1 tablet (75 mg total) by mouth daily as needed for Migraine. Place ODT tablet on the tongue; alternatively the ODT tablet may be placed under the tongue 30 tablet 0    SYNTHROID 75 mcg tablet Take 1 tablet (75 mcg total) by mouth once daily. 30 tablet 5    traMADoL (ULTRAM) 50 mg tablet Take 50 mg by mouth 2 (two) times daily as needed.      pantoprazole (PROTONIX) 40 MG tablet Take 40 mg by mouth once daily.      prednisoLONE acetate (PRED FORTE) 1 % DrpS       [DISCONTINUED] atogepant (QULIPTA) 60 mg Tab Take 60 mg by mouth once daily. (Patient not taking: Reported on 2023) 90 tablet 3     No  facility-administered encounter medications on file as of 6/1/2023.       History of Present Illness  58 y/o female following in neurology for reported migraine headaches, likely actually occipital neuralgia.    Prior seen by Dr. Davis, last visit 6 months prior.    Prior treatment included topamax and elavil without reported benefit.  She reports even the emgality not really helping headaches.  She reports as in left occipital region of the skull, and only actually benefits with use of occipital nerve blocks per Dr. Yanez.  Reports prior use of neurontin, trileptal, tegretol were not beneficial, or actually she did not tolerate them well.    She says Nurtec does help with some of the aura type vision changes that she has but her insurance has not been willing to cover it.  Sounds like she has predominant occipital neuralgia on the left with triggering migraine intermittently.  Really not use in continuing emgality as she feels it is not helping.  Last occipital injection was about a month ago, mainly lidocaine.  It is the only thing that has helped.    I discussed with her about 2nd opinion at [facility] given her prior failures in treatment and continue symptoms.  She is in agreement.  There is no head imaging here, she has lumbar stimulator and thus not able to do MRI.  Will order CT head.               Review of Systems  Review of Systems   Constitutional:  Negative for diaphoresis and fever.   HENT:  Negative for congestion, hearing loss and tinnitus.    Eyes:  Negative for blurred vision, double vision, photophobia, discharge and redness.   Respiratory:  Negative for cough and shortness of breath.    Cardiovascular:  Negative for chest pain.   Gastrointestinal:  Negative for abdominal pain, nausea and vomiting.   Musculoskeletal:  Negative for back pain, joint pain, myalgias and neck pain.   Skin:  Negative for itching and rash.   Neurological:  Positive for headaches. Negative for dizziness, tremors, sensory  change, speech change, focal weakness, seizures, loss of consciousness and weakness.   Psychiatric/Behavioral:  Negative for depression, hallucinations and memory loss. The patient does not have insomnia.    All other systems reviewed and are negative.   Objective:     NEUROLOGICAL EXAMINATION:     MENTAL STATUS   Oriented to person, place, and time.   Registration: recalls 3 of 3 objects. Recall at 5 minutes: recalls 3 of 3 objects.   Attention: normal. Concentration: normal.   Speech: speech is normal   Level of consciousness: alert  Knowledge: good and consistent with education.   Normal comprehension.     CRANIAL NERVES     CN II   Visual fields full to confrontation.   Visual acuity: normal  Right visual field deficit: none  Left visual field deficit: none     CN III, IV, VI   Pupils are equal, round, and reactive to light.  Extraocular motions are normal.   Right pupil: Size: 3 mm. Shape: regular. Reactivity: brisk. Consensual response: intact. Accommodation: intact.   Left pupil: Size: 3 mm. Shape: regular. Reactivity: brisk. Consensual response: intact. Accommodation: intact.   CN III: no CN III palsy  CN VI: no CN VI palsy  Nystagmus: none   Diplopia: none  Upgaze: normal  Downgaze: normal  Conjugate gaze: present  Vestibulo-ocular reflex: present    CN V   Facial sensation intact.   Right facial sensation deficit: none  Left facial sensation deficit: none  Right corneal reflex: normal  Left corneal reflex: normal    CN VII   Facial expression full, symmetric.   Right facial weakness: none  Left facial weakness: none  Right taste: normal  Left taste: normal    CN VIII   CN VIII normal.   Hearing: intact    CN IX, X   CN IX normal.   CN X normal.   Palate: symmetric    CN XI   CN XI normal.   Right sternocleidomastoid strength: normal  Left sternocleidomastoid strength: normal  Right trapezius strength: normal  Left trapezius strength: normal    CN XII   CN XII normal.   Tongue: not atrophic  Fasciculations:  absent  Tongue deviation: none    MOTOR EXAM   Muscle bulk: normal  Overall muscle tone: normal  Right arm tone: normal  Left arm tone: normal  Right arm pronator drift: absent  Left arm pronator drift: absent  Right leg tone: normal  Left leg tone: normal    Strength   Right neck flexion: 5/5  Left neck flexion: 5/5  Right neck extension: 5/5  Left neck extension:   Right deltoid: /5  Left deltoid:   Right biceps:   Left biceps: 5  Right triceps:   Left triceps: /  Right wrist flexion:   Left wrist flexion:   Right wrist extension:   Left wrist extension:   Right interossei:   Left interossei:   Right iliopsoas:   Left iliopsoas:   Right quadriceps:   Left quadriceps:   Right hamstrin/5  Left hamstrin/5  Right anterior tibial:   Left anterior tibial:   Right posterior tibial:   Left posterior tibial:   Right gastroc:   Left gastroc:     REFLEXES     Reflexes   Right brachioradialis: 2+  Left brachioradialis: 2+  Right biceps: 2+  Left biceps: 2+  Right triceps: 2+  Left triceps: 2+  Right patellar: 2+  Left patellar: 2+  Right achilles: 2+  Left achilles: 2+  Right plantar: normal  Left plantar: normal  Right Lizama: absent  Left Lizama: absent  Right ankle clonus: absent  Left ankle clonus: absent  Right pendular knee jerk: absent  Left pendular knee jerk: absent    SENSORY EXAM   Light touch normal.   Right arm light touch: normal  Left arm light touch: normal  Right leg light touch: normal  Left leg light touch: normal  Vibration normal.   Right arm vibration: normal  Left arm vibration: normal  Right leg vibration: normal  Left leg vibration: normal  Proprioception normal.   Right arm proprioception: normal  Left arm proprioception: normal  Right leg proprioception: normal  Left leg proprioception: normal  Pinprick normal.   Right arm pinprick: normal  Left arm pinprick: normal  Right leg pinprick: normal  Left leg pinprick:  normal  Graphesthesia: normal  Romberg: negative  Stereognosis: normal    GAIT AND COORDINATION     Gait  Gait: normal     Coordination   Finger to nose coordination: normal  Heel to shin coordination: normal  Tandem walking coordination: normal    Tremor   Resting tremor: absent  Intention tremor: absent  Action tremor: absent     Physical Exam  Vitals and nursing note reviewed.   Constitutional:       Appearance: Normal appearance.   HENT:      Head: Normocephalic.   Eyes:      Extraocular Movements: Extraocular movements intact and EOM normal.      Pupils: Pupils are equal, round, and reactive to light.   Cardiovascular:      Rate and Rhythm: Normal rate and regular rhythm.   Pulmonary:      Effort: Pulmonary effort is normal.      Breath sounds: Normal breath sounds.   Musculoskeletal:         General: No swelling or tenderness. Normal range of motion.      Cervical back: Normal range of motion and neck supple.      Right lower leg: No edema.      Left lower leg: No edema.   Skin:     General: Skin is warm and dry.      Coloration: Skin is not jaundiced.      Findings: No rash.   Neurological:      General: No focal deficit present.      Mental Status: She is alert and oriented to person, place, and time.      GCS: GCS eye subscore is 4. GCS verbal subscore is 5. GCS motor subscore is 6.      Cranial Nerves: No cranial nerve deficit.      Sensory: No sensory deficit.      Motor: Motor function is intact. No weakness.      Coordination: Coordination is intact. Coordination normal. Finger-Nose-Finger Test, Heel to Shin Test and Romberg Test normal.      Gait: Gait is intact. Gait and tandem walk normal.      Deep Tendon Reflexes: Reflexes normal.      Reflex Scores:       Tricep reflexes are 2+ on the right side and 2+ on the left side.       Bicep reflexes are 2+ on the right side and 2+ on the left side.       Brachioradialis reflexes are 2+ on the right side and 2+ on the left side.       Patellar reflexes are  2+ on the right side and 2+ on the left side.       Achilles reflexes are 2+ on the right side and 2+ on the left side.  Psychiatric:         Mood and Affect: Mood normal.         Speech: Speech normal.         Behavior: Behavior normal.        Assessment:     Problem List Items Addressed This Visit          Neuro    Chronic migraine with aura    Relevant Orders    CT Head Without Contrast       Orthopedic    Occipital neuralgia of left side - Primary    Relevant Orders    Ambulatory referral/consult to Neurosurgery    CBC Auto Differential    Comprehensive Metabolic Panel        Primary Diagnosis and ICD10  Occipital neuralgia of left side [M54.81]    Plan:     Patient Instructions   Continue Nurtec as needed for headache, will use [facility]  Will refer to [facility] neurosurgery for 2nd opinion on treatment options  Continue followup with Dr. Yanez    Medications Discontinued During This Encounter   Medication Reason    atogepant (QULIPTA) 60 mg Tab        Requested Prescriptions      No prescriptions requested or ordered in this encounter       Orders Placed This Encounter   Procedures    CT Head Without Contrast    CBC Auto Differential    Comprehensive Metabolic Panel    Ambulatory referral/consult to Neurosurgery

## 2023-06-02 RX ORDER — RIMEGEPANT SULFATE 75 MG/75MG
75 TABLET, ORALLY DISINTEGRATING ORAL ONCE AS NEEDED
Qty: 16 TABLET | Refills: 2 | Status: SHIPPED | OUTPATIENT
Start: 2023-06-02 | End: 2024-03-25

## 2023-06-19 ENCOUNTER — HOSPITAL ENCOUNTER (OUTPATIENT)
Dept: RADIOLOGY | Facility: HOSPITAL | Age: 58
Discharge: HOME | End: 2023-06-19
Attending: NURSE PRACTITIONER
Payer: COMMERCIAL

## 2023-06-19 DIAGNOSIS — G43.E09 CHRONIC MIGRAINE WITH AURA: ICD-10-CM

## 2023-06-19 PROCEDURE — 70450 CT HEAD/BRAIN W/O DYE: CPT | Mod: TC

## 2023-06-19 PROCEDURE — 70450 CT HEAD/BRAIN W/O DYE: CPT | Mod: 26,,, | Performed by: STUDENT IN AN ORGANIZED HEALTH CARE EDUCATION/TRAINING PROGRAM

## 2023-06-19 PROCEDURE — 70450 CT HEAD WITHOUT CONTRAST: ICD-10-PCS | Mod: 26,,, | Performed by: STUDENT IN AN ORGANIZED HEALTH CARE EDUCATION/TRAINING PROGRAM

## 2023-06-20 ENCOUNTER — TELEPHONE (OUTPATIENT)
Dept: NEUROLOGY | Facility: CLINIC | Age: 58
End: 2023-06-20
Payer: COMMERCIAL

## 2023-06-20 NOTE — TELEPHONE ENCOUNTER
Called and informed pt that CT of the head showed no acute findings. Pt voiced understanding by verbal return.              ----- Message from Rodríguez Ball CMA sent at 6/20/2023  8:53 AM CDT -----    ----- Message -----  From: TOMER Michaels  Sent: 6/20/2023   5:53 AM CDT  To: Rodríguez Ball CMA    No acute findings on the CT head

## 2023-07-12 ENCOUNTER — OFFICE VISIT (OUTPATIENT)
Dept: FAMILY MEDICINE | Facility: CLINIC | Age: 58
End: 2023-07-12
Payer: COMMERCIAL

## 2023-07-12 VITALS
HEART RATE: 92 BPM | DIASTOLIC BLOOD PRESSURE: 71 MMHG | WEIGHT: 143 LBS | SYSTOLIC BLOOD PRESSURE: 108 MMHG | TEMPERATURE: 98 F | BODY MASS INDEX: 27 KG/M2 | HEIGHT: 61 IN | RESPIRATION RATE: 18 BRPM | OXYGEN SATURATION: 96 %

## 2023-07-12 DIAGNOSIS — R05.1 ACUTE COUGH: ICD-10-CM

## 2023-07-12 DIAGNOSIS — H92.03 OTALGIA OF BOTH EARS: ICD-10-CM

## 2023-07-12 DIAGNOSIS — J01.00 ACUTE NON-RECURRENT MAXILLARY SINUSITIS: Primary | ICD-10-CM

## 2023-07-12 PROCEDURE — 99213 PR OFFICE/OUTPT VISIT, EST, LEVL III, 20-29 MIN: ICD-10-PCS | Mod: ,,, | Performed by: NURSE PRACTITIONER

## 2023-07-12 PROCEDURE — 99213 OFFICE O/P EST LOW 20 MIN: CPT | Mod: ,,, | Performed by: NURSE PRACTITIONER

## 2023-07-12 RX ORDER — AZITHROMYCIN 250 MG/1
250 TABLET, FILM COATED ORAL DAILY
Qty: 6 TABLET | Refills: 0 | Status: SHIPPED | OUTPATIENT
Start: 2023-07-12 | End: 2023-07-17

## 2023-07-12 RX ORDER — IPRATROPIUM BROMIDE 21 UG/1
2 SPRAY, METERED NASAL 2 TIMES DAILY
Qty: 30 ML | Refills: 0 | Status: SHIPPED | OUTPATIENT
Start: 2023-07-12 | End: 2024-03-25

## 2023-07-12 RX ORDER — PREDNISONE 20 MG/1
20 TABLET ORAL DAILY
Qty: 5 TABLET | Refills: 0 | Status: SHIPPED | OUTPATIENT
Start: 2023-07-12 | End: 2023-10-02

## 2023-07-12 RX ORDER — PHENTERMINE HYDROCHLORIDE 37.5 MG/1
37.5 TABLET ORAL EVERY MORNING
COMMUNITY
Start: 2023-04-11 | End: 2023-10-02

## 2023-07-12 RX ORDER — ALBUTEROL SULFATE 0.83 MG/ML
2.5 SOLUTION RESPIRATORY (INHALATION) EVERY 6 HOURS PRN
Qty: 90 ML | Refills: 0 | Status: SHIPPED | OUTPATIENT
Start: 2023-07-12 | End: 2024-07-11

## 2023-07-12 NOTE — PROGRESS NOTES
Subjective:       Patient ID: Muna Newberry is a 57 y.o. female.    Chief Complaint: Cough (Onset Monday. At home covid test negative today. Does not want to to swabbed. ), Otalgia (Left ear fullness. ), and Fever (Low grade fever monday. )    Cough (Onset Monday. At home covid test negative today. Does not want to to swabbed. ), Otalgia (Left ear fullness. ), and Fever (Low grade fever monday. )- needs refill of albuterol solution      Cough  Associated symptoms include ear pain and a fever. Pertinent negatives include no chest pain, headaches, rash, sore throat or shortness of breath.   Otalgia   Associated symptoms include coughing. Pertinent negatives include no abdominal pain, headaches, neck pain, rash, sore throat or vomiting.   Fever   Associated symptoms include coughing and ear pain. Pertinent negatives include no abdominal pain, chest pain, congestion, headaches, nausea, rash, sore throat or vomiting.   Review of Systems   Constitutional:  Positive for fever. Negative for appetite change and fatigue.   HENT:  Positive for ear pain. Negative for nasal congestion and sore throat.    Eyes:  Negative for pain, discharge and itching.   Respiratory:  Positive for cough. Negative for shortness of breath.    Cardiovascular:  Negative for chest pain and leg swelling.   Gastrointestinal:  Negative for abdominal pain, change in bowel habit, nausea, vomiting and change in bowel habit.   Musculoskeletal:  Negative for back pain, gait problem and neck pain.   Integumentary:  Negative for rash and wound.   Allergic/Immunologic: Negative for immunocompromised state.   Neurological:  Negative for dizziness, weakness and headaches.   All other systems reviewed and are negative.      Objective:      Physical Exam  Vitals and nursing note reviewed.   Constitutional:       General: She is not in acute distress.     Appearance: Normal appearance. She is not ill-appearing, toxic-appearing or diaphoretic.   HENT:      Head:  Normocephalic.      Right Ear: Tympanic membrane, ear canal and external ear normal.      Left Ear: Tympanic membrane, ear canal and external ear normal.      Nose: Nose normal. No congestion or rhinorrhea.      Mouth/Throat:      Mouth: Mucous membranes are moist.      Pharynx: No oropharyngeal exudate or posterior oropharyngeal erythema.   Eyes:      General: No scleral icterus.        Right eye: No discharge.         Left eye: No discharge.      Extraocular Movements: Extraocular movements intact.      Conjunctiva/sclera: Conjunctivae normal.      Pupils: Pupils are equal, round, and reactive to light.   Cardiovascular:      Rate and Rhythm: Normal rate and regular rhythm.      Pulses: Normal pulses.      Heart sounds: Normal heart sounds. No murmur heard.  Pulmonary:      Effort: Pulmonary effort is normal. No respiratory distress.      Breath sounds: Normal breath sounds. No wheezing, rhonchi or rales.   Musculoskeletal:         General: Normal range of motion.      Cervical back: Neck supple. No tenderness.   Lymphadenopathy:      Cervical: No cervical adenopathy.   Skin:     General: Skin is warm and dry.      Capillary Refill: Capillary refill takes less than 2 seconds.      Findings: No rash.   Neurological:      Mental Status: She is alert and oriented to person, place, and time.   Psychiatric:         Mood and Affect: Mood normal.         Behavior: Behavior normal.         Thought Content: Thought content normal.         Judgment: Judgment normal.          Assessment:       1. Acute non-recurrent maxillary sinusitis    2. Acute cough    3. Otalgia of both ears        Plan:   Acute non-recurrent maxillary sinusitis  -     predniSONE (DELTASONE) 20 MG tablet; Take 1 tablet (20 mg total) by mouth once daily.  Dispense: 5 tablet; Refill: 0  -     ipratropium (ATROVENT) 21 mcg (0.03 %) nasal spray; 2 sprays by Each Nostril route 2 (two) times daily.  Dispense: 30 mL; Refill: 0  -     azithromycin (ZITHROMAX  Z-MAR) 250 MG tablet; Take 1 tablet (250 mg total) by mouth once daily. Take 2 tablets on day 1 and then take 1 tablet days 2-5 for 5 days  Dispense: 6 tablet; Refill: 0    Acute cough  -     albuterol (PROVENTIL) 2.5 mg /3 mL (0.083 %) nebulizer solution; Take 3 mLs (2.5 mg total) by nebulization every 6 (six) hours as needed for Wheezing. Rescue  Dispense: 90 mL; Refill: 0    Otalgia of both ears         Risks, benefits, and side effects were discussed with the patient. All questions were answered to the fullest satisfaction of the patient, and pt verbalized understanding and agreement to treatment plan. Pt was to call with any new or worsening symptoms, or present to the ER

## 2023-07-12 NOTE — LETTER
July 12, 2023      [facility]  [address] [address]  Phone: [phone]  Fax: [phone]       Patient: Muna Newberry   YOB: 1965  Date of Visit: 07/12/2023    To Whom It May Concern:    Ant Newberry  was at [facility] on 07/12/2023. The patient may return to work/school on 07/13/2023 with no restrictions. If you have any questions or concerns, or if I can be of further assistance, please do not hesitate to contact me.    Sincerely,    TOMER Robb

## 2023-10-02 ENCOUNTER — OFFICE VISIT (OUTPATIENT)
Dept: FAMILY MEDICINE | Facility: CLINIC | Age: 58
End: 2023-10-02
Payer: COMMERCIAL

## 2023-10-02 VITALS
OXYGEN SATURATION: 97 % | WEIGHT: 147 LBS | DIASTOLIC BLOOD PRESSURE: 74 MMHG | HEART RATE: 77 BPM | HEIGHT: 61 IN | SYSTOLIC BLOOD PRESSURE: 111 MMHG | TEMPERATURE: 98 F | BODY MASS INDEX: 27.75 KG/M2 | RESPIRATION RATE: 20 BRPM

## 2023-10-02 DIAGNOSIS — Z00.00 ROUTINE GENERAL MEDICAL EXAMINATION AT A HEALTH CARE FACILITY: Primary | ICD-10-CM

## 2023-10-02 DIAGNOSIS — D50.9 IRON DEFICIENCY ANEMIA, UNSPECIFIED IRON DEFICIENCY ANEMIA TYPE: ICD-10-CM

## 2023-10-02 DIAGNOSIS — E03.9 ACQUIRED HYPOTHYROIDISM: Chronic | ICD-10-CM

## 2023-10-02 DIAGNOSIS — Z13.1 SCREENING FOR DIABETES MELLITUS: ICD-10-CM

## 2023-10-02 DIAGNOSIS — E55.9 VITAMIN D DEFICIENCY: Chronic | ICD-10-CM

## 2023-10-02 DIAGNOSIS — Z79.899 ENCOUNTER FOR LONG-TERM (CURRENT) USE OF OTHER MEDICATIONS: ICD-10-CM

## 2023-10-02 DIAGNOSIS — Z13.220 SCREENING FOR LIPOID DISORDERS: ICD-10-CM

## 2023-10-02 DIAGNOSIS — G89.29 CHRONIC PAIN OF MULTIPLE JOINTS: Chronic | ICD-10-CM

## 2023-10-02 DIAGNOSIS — G25.81 RESTLESS LEGS SYNDROME: Chronic | ICD-10-CM

## 2023-10-02 DIAGNOSIS — E53.8 COBALAMIN DEFICIENCY: Chronic | ICD-10-CM

## 2023-10-02 DIAGNOSIS — Z11.59 NEED FOR HEPATITIS C SCREENING TEST: ICD-10-CM

## 2023-10-02 DIAGNOSIS — M25.50 CHRONIC PAIN OF MULTIPLE JOINTS: Chronic | ICD-10-CM

## 2023-10-02 PROBLEM — H92.03 OTALGIA OF BOTH EARS: Status: RESOLVED | Noted: 2023-07-12 | Resolved: 2023-10-02

## 2023-10-02 PROBLEM — R05.1 ACUTE COUGH: Status: RESOLVED | Noted: 2023-07-12 | Resolved: 2023-10-02

## 2023-10-02 PROBLEM — K86.1 IDIOPATHIC CHRONIC PANCREATITIS: Chronic | Status: ACTIVE | Noted: 2022-12-27

## 2023-10-02 PROBLEM — S80.01XA CONTUSION OF RIGHT KNEE: Status: RESOLVED | Noted: 2023-03-06 | Resolved: 2023-10-02

## 2023-10-02 PROBLEM — M54.81 OCCIPITAL NEURALGIA OF LEFT SIDE: Chronic | Status: ACTIVE | Noted: 2023-06-01

## 2023-10-02 PROBLEM — J45.20 MILD INTERMITTENT ASTHMA WITHOUT COMPLICATION: Chronic | Status: ACTIVE | Noted: 2021-04-21

## 2023-10-02 PROBLEM — J01.00 ACUTE NON-RECURRENT MAXILLARY SINUSITIS: Status: RESOLVED | Noted: 2023-07-12 | Resolved: 2023-10-02

## 2023-10-02 PROBLEM — J32.9 CHRONIC SINUSITIS: Chronic | Status: ACTIVE | Noted: 2021-01-25

## 2023-10-02 PROBLEM — B02.9 HERPES ZOSTER WITHOUT COMPLICATION: Status: RESOLVED | Noted: 2021-12-20 | Resolved: 2023-10-02

## 2023-10-02 PROBLEM — J45.909 ASTHMA: Chronic | Status: ACTIVE | Noted: 2021-04-21

## 2023-10-02 PROBLEM — G43.E09 CHRONIC MIGRAINE WITH AURA: Chronic | Status: ACTIVE | Noted: 2022-12-27

## 2023-10-02 PROBLEM — M81.0 OSTEOPOROSIS: Chronic | Status: ACTIVE | Noted: 2021-04-21

## 2023-10-02 LAB
25(OH)D3 SERPL-MCNC: 37.7 NG/ML
ALBUMIN SERPL BCP-MCNC: 3.7 G/DL (ref 3.5–5)
ALBUMIN/GLOB SERPL: 1.4 {RATIO}
ALP SERPL-CCNC: 59 U/L (ref 46–118)
ALT SERPL W P-5'-P-CCNC: 23 U/L (ref 13–56)
ANION GAP SERPL CALCULATED.3IONS-SCNC: 7 MMOL/L (ref 7–16)
AST SERPL W P-5'-P-CCNC: 14 U/L (ref 15–37)
BASOPHILS # BLD AUTO: 0.04 K/UL (ref 0–0.2)
BASOPHILS NFR BLD AUTO: 0.5 % (ref 0–1)
BILIRUB SERPL-MCNC: 0.4 MG/DL (ref ?–1.2)
BUN SERPL-MCNC: 8 MG/DL (ref 7–18)
BUN/CREAT SERPL: 14 (ref 6–20)
CALCIUM SERPL-MCNC: 8.6 MG/DL (ref 8.5–10.1)
CHLORIDE SERPL-SCNC: 108 MMOL/L (ref 98–107)
CHOLEST SERPL-MCNC: 210 MG/DL (ref 0–200)
CHOLEST/HDLC SERPL: 2.8 {RATIO}
CO2 SERPL-SCNC: 30 MMOL/L (ref 21–32)
CREAT SERPL-MCNC: 0.58 MG/DL (ref 0.55–1.02)
CRP SERPL-MCNC: <0.29 MG/DL (ref 0–0.8)
DIFFERENTIAL METHOD BLD: ABNORMAL
EGFR (NO RACE VARIABLE) (RUSH/TITUS): 104 ML/MIN/1.73M2
EOSINOPHIL # BLD AUTO: 0.15 K/UL (ref 0–0.5)
EOSINOPHIL NFR BLD AUTO: 1.9 % (ref 1–4)
ERYTHROCYTE [DISTWIDTH] IN BLOOD BY AUTOMATED COUNT: 12.4 % (ref 11.5–14.5)
ERYTHROCYTE [SEDIMENTATION RATE] IN BLOOD BY WESTERGREN METHOD: 3 MM/HR (ref 0–30)
EST. AVERAGE GLUCOSE BLD GHB EST-MCNC: 81 MG/DL
FERRITIN SERPL-MCNC: 267 NG/ML (ref 8–252)
GLOBULIN SER-MCNC: 2.7 G/DL (ref 2–4)
GLUCOSE SERPL-MCNC: 79 MG/DL (ref 74–106)
HBA1C MFR BLD HPLC: 5 % (ref 4.5–6.6)
HCT VFR BLD AUTO: 39.5 % (ref 38–47)
HCV AB SER QL: NORMAL
HDLC SERPL-MCNC: 75 MG/DL (ref 40–60)
HGB BLD-MCNC: 13.3 G/DL (ref 12–16)
IMM GRANULOCYTES # BLD AUTO: 0.01 K/UL (ref 0–0.04)
IMM GRANULOCYTES NFR BLD: 0.1 % (ref 0–0.4)
IRON SATN MFR SERPL: 42 % (ref 14–50)
IRON SERPL-MCNC: 105 ΜG/DL (ref 50–170)
LDLC SERPL CALC-MCNC: 118 MG/DL
LDLC/HDLC SERPL: 1.6 {RATIO}
LYMPHOCYTES # BLD AUTO: 3.09 K/UL (ref 1–4.8)
LYMPHOCYTES NFR BLD AUTO: 39.6 % (ref 27–41)
MCH RBC QN AUTO: 30.1 PG (ref 27–31)
MCHC RBC AUTO-ENTMCNC: 33.7 G/DL (ref 32–36)
MCV RBC AUTO: 89.4 FL (ref 80–96)
MONOCYTES # BLD AUTO: 0.59 K/UL (ref 0–0.8)
MONOCYTES NFR BLD AUTO: 7.6 % (ref 2–6)
MPC BLD CALC-MCNC: 10.2 FL (ref 9.4–12.4)
NEUTROPHILS # BLD AUTO: 3.92 K/UL (ref 1.8–7.7)
NEUTROPHILS NFR BLD AUTO: 50.3 % (ref 53–65)
NONHDLC SERPL-MCNC: 135 MG/DL
NRBC # BLD AUTO: 0 X10E3/UL
NRBC, AUTO (.00): 0 %
PLATELET # BLD AUTO: 247 K/UL (ref 150–400)
POTASSIUM SERPL-SCNC: 4 MMOL/L (ref 3.5–5.1)
PROT SERPL-MCNC: 6.4 G/DL (ref 6.4–8.2)
RBC # BLD AUTO: 4.42 M/UL (ref 4.2–5.4)
RHEUMATOID FACT SER NEPH-ACNC: <10 IU/ML (ref 0–15)
SODIUM SERPL-SCNC: 141 MMOL/L (ref 136–145)
TIBC SERPL-MCNC: 250 ΜG/DL (ref 250–450)
TRIGL SERPL-MCNC: 85 MG/DL (ref 35–150)
TSH SERPL DL<=0.005 MIU/L-ACNC: 3.24 UIU/ML (ref 0.36–3.74)
VLDLC SERPL-MCNC: 17 MG/DL
WBC # BLD AUTO: 7.8 K/UL (ref 4.5–11)

## 2023-10-02 PROCEDURE — 85651 SEDIMENTATION RATE, AUTOMATED: ICD-10-PCS | Mod: ICN,,, | Performed by: CLINICAL MEDICAL LABORATORY

## 2023-10-02 PROCEDURE — 83036 HEMOGLOBIN A1C: ICD-10-PCS | Mod: ICN,,, | Performed by: CLINICAL MEDICAL LABORATORY

## 2023-10-02 PROCEDURE — 82306 VITAMIN D: ICD-10-PCS | Mod: ICN,,, | Performed by: CLINICAL MEDICAL LABORATORY

## 2023-10-02 PROCEDURE — 86431 RHEUMATOID FACTOR QUANT: CPT | Mod: ICN,,, | Performed by: CLINICAL MEDICAL LABORATORY

## 2023-10-02 PROCEDURE — 99396 PREV VISIT EST AGE 40-64: CPT | Mod: ,,, | Performed by: NURSE PRACTITIONER

## 2023-10-02 PROCEDURE — 80061 LIPID PANEL: ICD-10-PCS | Mod: ICN,,, | Performed by: CLINICAL MEDICAL LABORATORY

## 2023-10-02 PROCEDURE — 82306 VITAMIN D 25 HYDROXY: CPT | Mod: ICN,,, | Performed by: CLINICAL MEDICAL LABORATORY

## 2023-10-02 PROCEDURE — 83540 ASSAY OF IRON: CPT | Mod: ICN,,, | Performed by: CLINICAL MEDICAL LABORATORY

## 2023-10-02 PROCEDURE — 85651 RBC SED RATE NONAUTOMATED: CPT | Mod: ICN,,, | Performed by: CLINICAL MEDICAL LABORATORY

## 2023-10-02 PROCEDURE — 1159F MED LIST DOCD IN RCRD: CPT | Mod: ,,, | Performed by: NURSE PRACTITIONER

## 2023-10-02 PROCEDURE — 86140 C-REACTIVE PROTEIN: ICD-10-PCS | Mod: ICN,,, | Performed by: CLINICAL MEDICAL LABORATORY

## 2023-10-02 PROCEDURE — 86038 ANTINUCLEAR ANTIBODIES: CPT | Mod: ,,, | Performed by: CLINICAL MEDICAL LABORATORY

## 2023-10-02 PROCEDURE — 1160F RVW MEDS BY RX/DR IN RCRD: CPT | Mod: ,,, | Performed by: NURSE PRACTITIONER

## 2023-10-02 PROCEDURE — 82728 FERRITIN: ICD-10-PCS | Mod: ICN,,, | Performed by: CLINICAL MEDICAL LABORATORY

## 2023-10-02 PROCEDURE — 86803 HEPATITIS C ANTIBODY: ICD-10-PCS | Mod: ICN,,, | Performed by: CLINICAL MEDICAL LABORATORY

## 2023-10-02 PROCEDURE — 83036 HEMOGLOBIN GLYCOSYLATED A1C: CPT | Mod: ICN,,, | Performed by: CLINICAL MEDICAL LABORATORY

## 2023-10-02 PROCEDURE — 86038 ANA EIA W/REFLEX DSDNA/ENA: ICD-10-PCS | Mod: ,,, | Performed by: CLINICAL MEDICAL LABORATORY

## 2023-10-02 PROCEDURE — 83550 IRON BINDING TEST: CPT | Mod: ICN,,, | Performed by: CLINICAL MEDICAL LABORATORY

## 2023-10-02 PROCEDURE — 3044F HG A1C LEVEL LT 7.0%: CPT | Mod: ,,, | Performed by: NURSE PRACTITIONER

## 2023-10-02 PROCEDURE — 86803 HEPATITIS C AB TEST: CPT | Mod: ICN,,, | Performed by: CLINICAL MEDICAL LABORATORY

## 2023-10-02 PROCEDURE — 82728 ASSAY OF FERRITIN: CPT | Mod: ICN,,, | Performed by: CLINICAL MEDICAL LABORATORY

## 2023-10-02 PROCEDURE — 1159F PR MEDICATION LIST DOCUMENTED IN MEDICAL RECORD: ICD-10-PCS | Mod: ,,, | Performed by: NURSE PRACTITIONER

## 2023-10-02 PROCEDURE — 3074F PR MOST RECENT SYSTOLIC BLOOD PRESSURE < 130 MM HG: ICD-10-PCS | Mod: ,,, | Performed by: NURSE PRACTITIONER

## 2023-10-02 PROCEDURE — 3008F PR BODY MASS INDEX (BMI) DOCUMENTED: ICD-10-PCS | Mod: ,,, | Performed by: NURSE PRACTITIONER

## 2023-10-02 PROCEDURE — 1160F PR REVIEW ALL MEDS BY PRESCRIBER/CLIN PHARMACIST DOCUMENTED: ICD-10-PCS | Mod: ,,, | Performed by: NURSE PRACTITIONER

## 2023-10-02 PROCEDURE — 80050 GENERAL HEALTH PANEL: CPT | Mod: ICN,,, | Performed by: CLINICAL MEDICAL LABORATORY

## 2023-10-02 PROCEDURE — 83550 IRON AND TIBC: ICD-10-PCS | Mod: ICN,,, | Performed by: CLINICAL MEDICAL LABORATORY

## 2023-10-02 PROCEDURE — 3074F SYST BP LT 130 MM HG: CPT | Mod: ,,, | Performed by: NURSE PRACTITIONER

## 2023-10-02 PROCEDURE — 86431 RHEUMATOID QUANTITATIVE: ICD-10-PCS | Mod: ICN,,, | Performed by: CLINICAL MEDICAL LABORATORY

## 2023-10-02 PROCEDURE — 80050 PR  GENERAL HEALTH PANEL: ICD-10-PCS | Mod: ICN,,, | Performed by: CLINICAL MEDICAL LABORATORY

## 2023-10-02 PROCEDURE — 80061 LIPID PANEL: CPT | Mod: ICN,,, | Performed by: CLINICAL MEDICAL LABORATORY

## 2023-10-02 PROCEDURE — 99396 PR PREVENTIVE VISIT,EST,40-64: ICD-10-PCS | Mod: ,,, | Performed by: NURSE PRACTITIONER

## 2023-10-02 PROCEDURE — 3078F DIAST BP <80 MM HG: CPT | Mod: ,,, | Performed by: NURSE PRACTITIONER

## 2023-10-02 PROCEDURE — 83540 IRON AND TIBC: ICD-10-PCS | Mod: ICN,,, | Performed by: CLINICAL MEDICAL LABORATORY

## 2023-10-02 PROCEDURE — 3008F BODY MASS INDEX DOCD: CPT | Mod: ,,, | Performed by: NURSE PRACTITIONER

## 2023-10-02 PROCEDURE — 86140 C-REACTIVE PROTEIN: CPT | Mod: ICN,,, | Performed by: CLINICAL MEDICAL LABORATORY

## 2023-10-02 PROCEDURE — 3044F PR MOST RECENT HEMOGLOBIN A1C LEVEL <7.0%: ICD-10-PCS | Mod: ,,, | Performed by: NURSE PRACTITIONER

## 2023-10-02 PROCEDURE — 3078F PR MOST RECENT DIASTOLIC BLOOD PRESSURE < 80 MM HG: ICD-10-PCS | Mod: ,,, | Performed by: NURSE PRACTITIONER

## 2023-10-02 RX ORDER — PHENTERMINE HYDROCHLORIDE 8 MG/1
0.5 TABLET ORAL 3 TIMES DAILY PRN
COMMUNITY
Start: 2023-09-12 | End: 2024-01-04

## 2023-10-02 NOTE — PROGRESS NOTES
[facility] - FAMILY MEDICINE       PATIENT NAME: Muna Newberry   : 1965    AGE: 58 y.o. DATE OF ENCOUNTER: 10/2/23    MRN: [id number]      Subjective     Patient ID: Muna Newberry is a 58 y.o. female.    Chief Complaint: Establish Care (Patient presents to the clinic for est care/DEXA scan will they call?/Mammogram pt normally calls./Colonoscopy Dr Adame in [place], 7 years ago, did have polyp but was able to be surgically removed,  has chronic pancreatitis, ) and Lab Work  (Needing Ferritin levels checked, Vitamin D levels, B12 levels.(Just had b12 shot last Thursday))    HPI  Presents to establish PCP. Is fasting.   PMH multiple joint OA & DDD spine, father had RA, wants to be tested due to chronic multiple joint pain.  Has spinal cord stimulator upper back per [facility] has helped a lot.    PMH ITP - had lots of tests, treated with steroids and IVIG x1 year then resolved and developed chronic pancreatitis, also had shingles left ear and lost a lot of hearing, has hearing aids; idiopathic chronic pancreatitis diagnosed approximately , no past ETOH use has learned how to eat, and not having as many flare ups  [facility] and advised repeat 10 years.    Chronic migraines developed suddenly 5-6 yrs ago, temporarily lost vision left eye, was hospitalized and had LP then no headaches x2 years but have increased in frequency; the only thing that helps his an occipital lidocaine a Toradol injection per Dr[facility] Yane[facility], next appointment 10/10/2023; Nurtec helps or but not headache, followed by Neurology    History refractory RLS diagnosed per Dr. Toscano, history low ferritin and was on iron infusions with last done 2022, Dr. King, hematologist, f/u p.r.n..  Had EGD to evaluate for signs of bleeding per Dr. Everardo Tubbs with no source found.  Requests ferritin be checked today due to increased leg and muscle cramping and  fatigue.    Last Tdap approximately 5 years ago before grandchild was born.    Followed by [facility], provider there started B12 IM.    Review of Systems   Constitutional:  Positive for fatigue. Negative for chills and fever.   Respiratory: Negative.     Cardiovascular: Negative.    Gastrointestinal: Negative.    Musculoskeletal:  Positive for arthralgias, back pain and leg pain (RLS). Negative for gait problem.   Integumentary:  Negative.   Neurological: Negative.  Headaches: migraines.   Psychiatric/Behavioral: Negative.         Tobacco Use: Low Risk  (10/2/2023)    Patient History     Smoking Tobacco Use: Never     Smokeless Tobacco Use: Never     Passive Exposure: Not on file     Review of patient's allergies indicates:   Allergen Reactions    Phenergan [promethazine]      IV phenergan only    Sulfa (sulfonamide antibiotics) Blisters    Opioids - morphine analogues Rash    Penicillins Rash     Current Outpatient Medications   Medication Instructions    albuterol (PROVENTIL) 2.5 mg, Nebulization, Every 6 hours PRN, Rescue    albuterol (VENTOLIN HFA) 90 mcg/actuation inhaler 2 puffs, Inhalation, Every 6 hours PRN, Rescue    cyanocobalamin 1,000 mcg, Every 28 days    HYDROcodone-acetaminophen (NORCO) 7.5-325 mg per tablet TAKE 1 TABLET BY MOUTH EVERY 12 TO 24 HOURS.    ipratropium (ATROVENT) 21 mcg (0.03 %) nasal spray 2 sprays, Each Nostril, 2 times daily    LOMAIRA 8 mg Tab 0.5 tablets, Oral, 3 times daily PRN    NURTEC 75 mg, Oral, Once as needed, Place ODT tablet on the tongue; alternatively the ODT tablet may be placed under the tongue    ondansetron (ZOFRAN) 4 mg, Oral, Every 6 hours PRN    pantoprazole (PROTONIX) 40 mg, Oral, Daily PRN    promethazine (PHENERGAN) 12.5 mg, Oral, Every 6 hours PRN    SYNTHROID 75 mcg, Oral, Daily    traMADoL (ULTRAM) 50 mg, Oral, Nightly, Take 1/2-1 tablet at bedtime daily prn.     Medications Discontinued During This Encounter   Medication Reason    RESTASIS  "0.05 % ophthalmic emulsion Patient no longer taking    predniSONE (DELTASONE) 20 MG tablet Patient no longer taking    prednisoLONE acetate (PRED FORTE) 1 % DrpS Patient no longer taking    phentermine (ADIPEX-P) 37.5 mg tablet Patient no longer taking    galcanezumab-gnlm (EMGALITY PEN) 120 mg/mL PnIj Patient no longer taking    ergocalciferol (ERGOCALCIFEROL) 50,000 unit Cap Patient no longer taking    SYNTHROID 75 mcg tablet Dose adjustment       Past Medical History:   Diagnosis Date    Herpes zoster without complication 12/20/2021    Idiopathic thrombotic thrombocytopenic purpura     Osteoarthrosis     Osteoporosis      Health Maintenance Topics with due status: Not Due       Topic Last Completion Date    Hemoglobin A1c (Diabetic Prevention Screening) 10/02/2023    Lipid Panel 10/02/2023     Immunization History   Administered Date(s) Administered    Influenza 10/17/2003       Objective     Body mass index is 27.78 kg/m².  Wt Readings from Last 3 Encounters:   10/02/23 66.7 kg (147 lb)   07/12/23 64.9 kg (143 lb)   06/01/23 67.1 kg (148 lb)     Ht Readings from Last 3 Encounters:   10/02/23 5' 1" (1.549 m)   07/12/23 5' 1" (1.549 m)   06/01/23 5' 1" (1.549 m)     BP Readings from Last 3 Encounters:   10/02/23 111/74   07/12/23 108/71   06/01/23 112/72     Temp Readings from Last 3 Encounters:   10/02/23 97.7 °F (36.5 °C) (Oral)   07/12/23 98.4 °F (36.9 °C) (Oral)   03/20/23 98 °F (36.7 °C)     Pulse Readings from Last 3 Encounters:   10/02/23 77   07/12/23 92   06/01/23 84     Resp Readings from Last 3 Encounters:   10/02/23 20   07/12/23 18   06/01/23 18     PF Readings from Last 3 Encounters:   No data found for PF       Physical Exam  Vitals and nursing note reviewed.   Constitutional:       General: She is not in acute distress.     Appearance: Normal appearance.   HENT:      Head: Normocephalic.      Right Ear: Tympanic membrane, ear canal and external ear normal.      Left Ear: Tympanic membrane, ear " canal and external ear normal.      Nose: Nose normal.      Mouth/Throat:      Mouth: Mucous membranes are moist.      Pharynx: Oropharynx is clear.   Eyes:      Conjunctiva/sclera: Conjunctivae normal.   Neck:      Thyroid: No thyroid mass or thyromegaly.      Vascular: Normal carotid pulses. No carotid bruit.   Cardiovascular:      Rate and Rhythm: Normal rate and regular rhythm.      Pulses: Normal pulses.      Heart sounds: Normal heart sounds.   Pulmonary:      Effort: Pulmonary effort is normal.      Breath sounds: Normal breath sounds.   Abdominal:      Palpations: Abdomen is soft.      Tenderness: There is no abdominal tenderness.   Musculoskeletal:      Cervical back: Neck supple.      Right lower leg: No edema.      Left lower leg: No edema.   Lymphadenopathy:      Cervical: No cervical adenopathy.   Skin:     General: Skin is warm and dry.   Neurological:      General: No focal deficit present.      Mental Status: She is alert and oriented to person, place, and time.   Psychiatric:         Mood and Affect: Mood normal.         Behavior: Behavior normal.         Assessment and Plan     1. Routine general medical examination at a health care facility    2. Cobalamin deficiency    3. Restless legs syndrome  Overview:  Per Dr. Toscano has refractory RLS.  Tried lyrica, gabapentin, mirapex, requip - meds made it worse.    Iron infusion helped, f/u with Dr. King prn.    Orders:  -     Ferritin; Future; Expected date: 10/02/2023    4. Iron deficiency anemia, unspecified iron deficiency anemia type  -     Iron and TIBC; Future; Expected date: 10/02/2023  -     Ferritin; Future; Expected date: 10/02/2023  -     CBC Auto Differential; Future; Expected date: 10/02/2023    5. Vitamin D deficiency  -     Vitamin D; Future; Expected date: 10/02/2023    6. Acquired hypothyroidism  -     TSH; Future; Expected date: 10/02/2023  -     SYNTHROID 88 mcg tablet; Take 1 tablet (88 mcg total) by mouth before breakfast.   Dispense: 30 tablet; Refill: 2  -     TSH; Future; Expected date: 12/03/2023    7. Need for hepatitis C screening test  -     Hepatitis C Antibody; Future; Expected date: 10/02/2023    8. Encounter for long-term (current) use of other medications  -     Comprehensive Metabolic Panel; Future; Expected date: 10/02/2023    9. Chronic pain of multiple joints  -     LEELEE EIA w/ Reflex to dsDNA/AZIZA; Future; Expected date: 10/02/2023  -     C-Reactive Protein; Future; Expected date: 10/02/2023  -     Sedimentation Rate; Future; Expected date: 10/02/2023  -     Rheumatoid Quantitative; Future; Expected date: 10/02/2023    10. Screening for diabetes mellitus  -     Hemoglobin A1C; Future; Expected date: 10/02/2023    11. Screening for lipoid disorders  -     Lipid Panel; Future; Expected date: 10/02/2023       Plan:   Advised trying oral magnesium glycinate daily to see if it helps with headaches.  Declines HIV screen.  Missed mammo appt 2 wks ago and plans to reschedule.  Request colonoscopy report from Dr[facility] at [facility].  Healthy You plus other needed labs for monitoring and as requested.        I have reviewed the medications, allergies, and problem list.     Goal Actions:    What type of visit is the patient here for today?: Healthy You  Does the patient consent to enroll in [employer] Healthy?:  (n/a)  Is this a Wellness Follow Up?: No  What is your overall wellness goal? (select at least one): Lifestyle modifications  Choose 3: Lifestyle, Nutrition, Exercise  Lifestyle Actions : Take medications as prescribed  Nurtrition Actions: Eat heart healthy diet, drink 8-10 glasses of water per day  Exercise Actions: Recommend physical activity 30 minutes per day 3-5 times/week    F/u 6-mth f/u hypothyroidism; and HY 1 yr with fasting labs; f/u as needed.    Sharita Briggs [facility]

## 2023-10-03 ENCOUNTER — PATIENT OUTREACH (OUTPATIENT)
Dept: ADMINISTRATIVE | Facility: HOSPITAL | Age: 58
End: 2023-10-03

## 2023-10-03 LAB — ANA SER QL: NEGATIVE

## 2023-10-03 RX ORDER — LEVOTHYROXINE SODIUM 88 UG/1
88 TABLET ORAL
Qty: 30 TABLET | Refills: 2 | Status: SHIPPED | OUTPATIENT
Start: 2023-10-03 | End: 2024-01-06 | Stop reason: SDUPTHER

## 2023-10-03 NOTE — PROGRESS NOTES
Call pt and review results.  Stable lab findings, great A1c and LDL is improving.  Ferritin consistent with last check and iron WNL, no anemia noted.  Negative for rheumatoid and inflammation.  Normal A1c.  TSH goal of 0.4-0.5, increase Synthroid to 88 mcg daily and recheck TSH in 2 months.

## 2023-10-03 NOTE — PROGRESS NOTES
Post visit Population Health review of encounter with date of service  10/2/23 with Jessica.  All required HY components in encounter. Needs primary dx as z00.00 or z00.01 not  Restless legs syndrome message sent to provider's staff via staff message. Ivette Leslie appt for:  Pt needs appt for HY 2024 sent to [facility] to schedule via one note

## 2023-10-03 NOTE — PROGRESS NOTES
Patient notified of lab results.  Instructed to increase levothyroxine to 88 mcg daily (prescription sent to pharmacy)

## 2023-10-09 ENCOUNTER — PATIENT OUTREACH (OUTPATIENT)
Dept: ADMINISTRATIVE | Facility: HOSPITAL | Age: 58
End: 2023-10-09

## 2023-10-09 NOTE — PROGRESS NOTES
10/09/2023   Records Received.  Care Gaps addressed: Colorectal Cancer Screening  Outreach made to patient via: Chart Review/Record Upload     HM Updated with December 2016 Colonoscopy. Care Team and Medical History Updated. Spoke with Erika Diaz (Medical Records) at [facility] for clarification on recommended follow up; 10 year follow up is recommended.    **Due for Mammogram**

## 2023-10-25 ENCOUNTER — OFFICE VISIT (OUTPATIENT)
Dept: FAMILY MEDICINE | Facility: CLINIC | Age: 58
End: 2023-10-25
Payer: COMMERCIAL

## 2023-10-25 VITALS
BODY MASS INDEX: 26.06 KG/M2 | DIASTOLIC BLOOD PRESSURE: 80 MMHG | WEIGHT: 138 LBS | SYSTOLIC BLOOD PRESSURE: 126 MMHG | HEART RATE: 80 BPM | TEMPERATURE: 98 F | HEIGHT: 61 IN | OXYGEN SATURATION: 98 %

## 2023-10-25 DIAGNOSIS — J32.9 SINUSITIS, UNSPECIFIED CHRONICITY, UNSPECIFIED LOCATION: Primary | ICD-10-CM

## 2023-10-25 PROCEDURE — 3074F PR MOST RECENT SYSTOLIC BLOOD PRESSURE < 130 MM HG: ICD-10-PCS | Mod: ,,, | Performed by: FAMILY MEDICINE

## 2023-10-25 PROCEDURE — 96372 THER/PROPH/DIAG INJ SC/IM: CPT | Mod: ,,, | Performed by: FAMILY MEDICINE

## 2023-10-25 PROCEDURE — 3008F BODY MASS INDEX DOCD: CPT | Mod: ,,, | Performed by: FAMILY MEDICINE

## 2023-10-25 PROCEDURE — 3079F PR MOST RECENT DIASTOLIC BLOOD PRESSURE 80-89 MM HG: ICD-10-PCS | Mod: ,,, | Performed by: FAMILY MEDICINE

## 2023-10-25 PROCEDURE — 3044F HG A1C LEVEL LT 7.0%: CPT | Mod: ,,, | Performed by: FAMILY MEDICINE

## 2023-10-25 PROCEDURE — 96372 PR INJECTION,THERAP/PROPH/DIAG2ST, IM OR SUBCUT: ICD-10-PCS | Mod: ,,, | Performed by: FAMILY MEDICINE

## 2023-10-25 PROCEDURE — 1160F RVW MEDS BY RX/DR IN RCRD: CPT | Mod: ,,, | Performed by: FAMILY MEDICINE

## 2023-10-25 PROCEDURE — 99214 PR OFFICE/OUTPT VISIT, EST, LEVL IV, 30-39 MIN: ICD-10-PCS | Mod: 25,,, | Performed by: FAMILY MEDICINE

## 2023-10-25 PROCEDURE — 1159F PR MEDICATION LIST DOCUMENTED IN MEDICAL RECORD: ICD-10-PCS | Mod: ,,, | Performed by: FAMILY MEDICINE

## 2023-10-25 PROCEDURE — 99214 OFFICE O/P EST MOD 30 MIN: CPT | Mod: 25,,, | Performed by: FAMILY MEDICINE

## 2023-10-25 PROCEDURE — 3008F PR BODY MASS INDEX (BMI) DOCUMENTED: ICD-10-PCS | Mod: ,,, | Performed by: FAMILY MEDICINE

## 2023-10-25 PROCEDURE — 3079F DIAST BP 80-89 MM HG: CPT | Mod: ,,, | Performed by: FAMILY MEDICINE

## 2023-10-25 PROCEDURE — 3044F PR MOST RECENT HEMOGLOBIN A1C LEVEL <7.0%: ICD-10-PCS | Mod: ,,, | Performed by: FAMILY MEDICINE

## 2023-10-25 PROCEDURE — 1159F MED LIST DOCD IN RCRD: CPT | Mod: ,,, | Performed by: FAMILY MEDICINE

## 2023-10-25 PROCEDURE — 1160F PR REVIEW ALL MEDS BY PRESCRIBER/CLIN PHARMACIST DOCUMENTED: ICD-10-PCS | Mod: ,,, | Performed by: FAMILY MEDICINE

## 2023-10-25 PROCEDURE — 3074F SYST BP LT 130 MM HG: CPT | Mod: ,,, | Performed by: FAMILY MEDICINE

## 2023-10-25 RX ORDER — AZITHROMYCIN 250 MG/1
TABLET, FILM COATED ORAL
Qty: 6 TABLET | Refills: 0 | Status: SHIPPED | OUTPATIENT
Start: 2023-10-25 | End: 2024-01-04 | Stop reason: ALTCHOICE

## 2023-10-25 RX ORDER — CEFTRIAXONE 500 MG/1
500 INJECTION, POWDER, FOR SOLUTION INTRAMUSCULAR; INTRAVENOUS
Status: COMPLETED | OUTPATIENT
Start: 2023-10-25 | End: 2023-10-25

## 2023-10-25 RX ORDER — DEXAMETHASONE SODIUM PHOSPHATE 4 MG/ML
3 INJECTION, SOLUTION INTRA-ARTICULAR; INTRALESIONAL; INTRAMUSCULAR; INTRAVENOUS; SOFT TISSUE
Status: COMPLETED | OUTPATIENT
Start: 2023-10-25 | End: 2023-10-25

## 2023-10-25 RX ORDER — PREDNISONE 20 MG/1
20 TABLET ORAL DAILY
Qty: 5 TABLET | Refills: 0 | Status: SHIPPED | OUTPATIENT
Start: 2023-10-25 | End: 2023-10-30

## 2023-10-25 RX ADMIN — DEXAMETHASONE SODIUM PHOSPHATE 3 MG: 4 INJECTION, SOLUTION INTRA-ARTICULAR; INTRALESIONAL; INTRAMUSCULAR; INTRAVENOUS; SOFT TISSUE at 09:10

## 2023-10-25 RX ADMIN — CEFTRIAXONE 500 MG: 500 INJECTION, POWDER, FOR SOLUTION INTRAMUSCULAR; INTRAVENOUS at 09:10

## 2023-10-25 NOTE — LETTER
October 25, 2023      [facility]  [address] [facility] [address]  Phone: [phone]  Fax: [phone]       Patient: Muna Newberry   YOB: 1965  Date of Visit: 10/25/2023    To Whom It May Concern:    Ant Newberry  was at [facility] on 10/25/2023. The patient may return to work/school on 10/25/2023 with no restrictions. If you have any questions or concerns, or if I can be of further assistance, please do not hesitate to contact me.    Sincerely,    Dr. Be Auguste II

## 2023-10-25 NOTE — PROGRESS NOTES
Subjective:       Patient ID: Muna Newberry is a 58 y.o. female.    Chief Complaint: Sinus Problem (Possible sinus infection)    Sinus Problem  Associated symptoms include congestion and sinus pressure. Pertinent negatives include no chills, coughing, diaphoresis, ear pain, headaches, neck pain, shortness of breath, sneezing or sore throat.     Review of Systems   Constitutional:  Negative for activity change, appetite change, chills, diaphoresis, fatigue, fever and unexpected weight change.   HENT:  Positive for nasal congestion, postnasal drip, rhinorrhea and sinus pressure/congestion. Negative for dental problem, drooling, ear discharge, ear pain, facial swelling, hearing loss, mouth sores, nosebleeds, sneezing, sore throat, tinnitus, trouble swallowing, voice change and goiter.    Eyes:  Negative for photophobia, discharge, itching and visual disturbance.   Respiratory:  Negative for apnea, cough, choking, chest tightness, shortness of breath, wheezing and stridor.    Cardiovascular:  Negative for chest pain, palpitations, leg swelling and claudication.   Gastrointestinal:  Negative for abdominal distention, abdominal pain, anal bleeding, blood in stool, change in bowel habit, constipation, diarrhea, nausea and vomiting.   Endocrine: Negative for cold intolerance, heat intolerance, polydipsia, polyphagia and polyuria.   Genitourinary:  Negative for bladder incontinence, decreased urine volume, difficulty urinating, dysuria, enuresis, flank pain, frequency, hematuria, nocturia, pelvic pain and urgency.   Musculoskeletal:  Negative for arthralgias, back pain, gait problem, joint swelling, leg pain, myalgias, neck pain, neck stiffness and joint deformity.   Integumentary:  Negative for pallor, rash, wound, breast mass and breast tenderness.   Allergic/Immunologic: Negative for environmental allergies, food allergies and immunocompromised state.   Neurological:  Negative for dizziness, vertigo, tremors, seizures,  syncope, facial asymmetry, speech difficulty, weakness, light-headedness, numbness, headaches, memory loss and coordination difficulties.   Hematological:  Negative for adenopathy. Does not bruise/bleed easily.   Psychiatric/Behavioral:  Negative for agitation, behavioral problems, confusion, decreased concentration, dysphoric mood, hallucinations, self-injury, sleep disturbance and suicidal ideas. The patient is not nervous/anxious and is not hyperactive.    Breast: Negative for mass and tenderness        Objective:      Physical Exam  Vitals reviewed.   Constitutional:       Appearance: Normal appearance.   HENT:      Head: Normocephalic and atraumatic.      Right Ear: Tympanic membrane, ear canal and external ear normal.      Left Ear: Tympanic membrane, ear canal and external ear normal.      Nose: Congestion and rhinorrhea present.      Mouth/Throat:      Mouth: Mucous membranes are moist.      Pharynx: Oropharynx is clear. Posterior oropharyngeal erythema present.   Eyes:      Extraocular Movements: Extraocular movements intact.      Conjunctiva/sclera: Conjunctivae normal.      Pupils: Pupils are equal, round, and reactive to light.   Cardiovascular:      Rate and Rhythm: Normal rate and regular rhythm.      Pulses: Normal pulses.      Heart sounds: Normal heart sounds.   Pulmonary:      Effort: Pulmonary effort is normal.      Breath sounds: Normal breath sounds.   Abdominal:      General: Bowel sounds are normal.      Palpations: Abdomen is soft.   Musculoskeletal:         General: Normal range of motion.      Cervical back: Normal range of motion and neck supple.   Skin:     General: Skin is warm and dry.   Neurological:      General: No focal deficit present.      Mental Status: She is alert. Mental status is at baseline.   Psychiatric:         Mood and Affect: Mood normal.         Behavior: Behavior normal.         Thought Content: Thought content normal.         Judgment: Judgment normal.          Assessment:       1. Sinusitis, unspecified chronicity, unspecified location        Plan:     Sinusitis, unspecified chronicity, unspecified location  -     cefTRIAXone injection 500 mg  -     dexAMETHasone injection 3 mg  -     predniSONE (DELTASONE) 20 MG tablet; Take 1 tablet (20 mg total) by mouth once daily. for 5 days  Dispense: 5 tablet; Refill: 0  -     azithromycin (Z-MAR) 250 MG tablet; Take 2 tablets by mouth on day 1; Take 1 tablet by mouth on days 2-5  Dispense: 6 tablet; Refill: 0

## 2023-12-30 ENCOUNTER — HOSPITAL ENCOUNTER (EMERGENCY)
Facility: HOSPITAL | Age: 58
Discharge: HOME | End: 2023-12-30
Attending: EMERGENCY MEDICINE
Payer: COMMERCIAL

## 2023-12-30 VITALS
DIASTOLIC BLOOD PRESSURE: 50 MMHG | BODY MASS INDEX: 26.06 KG/M2 | TEMPERATURE: 98 F | OXYGEN SATURATION: 100 % | WEIGHT: 138 LBS | RESPIRATION RATE: 19 BRPM | HEART RATE: 82 BPM | HEIGHT: 61 IN | SYSTOLIC BLOOD PRESSURE: 120 MMHG

## 2023-12-30 DIAGNOSIS — S20.229A CONTUSION OF UPPER BACK, UNSPECIFIED LATERALITY, INITIAL ENCOUNTER: ICD-10-CM
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
## 2023-12-30 DIAGNOSIS — S80.12XA CONTUSION OF LEFT LOWER EXTREMITY, INITIAL ENCOUNTER: ICD-10-CM

## 2023-12-30 PROCEDURE — 99283 EMERGENCY DEPT VISIT LOW MDM: CPT

## 2023-12-30 PROCEDURE — 99283 EMERGENCY DEPT VISIT LOW MDM: CPT | Mod: ,,, | Performed by: EMERGENCY MEDICINE
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
## 2023-12-30 NOTE — DISCHARGE INSTRUCTIONS
Take Motrin and Tylenol together to help with pain.  Return to emergency department for any worsening or further problems.  Follow up in clinic with primary care provider as needed.  You may use a heating pad or ice pack to help with pain as well.

## 2024-01-04 ENCOUNTER — OFFICE VISIT (OUTPATIENT)
Dept: FAMILY MEDICINE | Facility: CLINIC | Age: 59
End: 2024-01-04
Payer: COMMERCIAL

## 2024-01-04 VITALS
OXYGEN SATURATION: 97 % | HEART RATE: 76 BPM | RESPIRATION RATE: 20 BRPM | DIASTOLIC BLOOD PRESSURE: 64 MMHG | SYSTOLIC BLOOD PRESSURE: 98 MMHG | WEIGHT: 144 LBS | HEIGHT: 61 IN | TEMPERATURE: 98 F | BODY MASS INDEX: 27.19 KG/M2

## 2024-01-04 DIAGNOSIS — J45.20 MILD INTERMITTENT ASTHMA WITHOUT COMPLICATION: Chronic | ICD-10-CM

## 2024-01-04 DIAGNOSIS — R09.89 SYMPTOMS OF UPPER RESPIRATORY INFECTION (URI): ICD-10-CM

## 2024-01-04 DIAGNOSIS — J01.00 ACUTE NON-RECURRENT MAXILLARY SINUSITIS: Primary | ICD-10-CM

## 2024-01-04 DIAGNOSIS — H65.92 OTITIS MEDIA WITH EFFUSION, LEFT: ICD-10-CM

## 2024-01-04 DIAGNOSIS — M25.512 LEFT SHOULDER PAIN, UNSPECIFIED CHRONICITY: Primary | ICD-10-CM

## 2024-01-04 DIAGNOSIS — E03.9 ACQUIRED HYPOTHYROIDISM: Chronic | ICD-10-CM

## 2024-01-04 LAB
CTP QC/QA: YES
FLUAV AG NPH QL: NEGATIVE
FLUBV AG NPH QL: NEGATIVE
S PYO RRNA THROAT QL PROBE: NEGATIVE
SARS-COV-2 AG RESP QL IA.RAPID: NEGATIVE
TSH SERPL DL<=0.005 MIU/L-ACNC: 2.06 UIU/ML (ref 0.36–3.74)

## 2024-01-04 PROCEDURE — 87804 INFLUENZA ASSAY W/OPTIC: CPT | Mod: 59,QW,, | Performed by: NURSE PRACTITIONER

## 2024-01-04 PROCEDURE — 1160F RVW MEDS BY RX/DR IN RCRD: CPT | Mod: ,,, | Performed by: NURSE PRACTITIONER

## 2024-01-04 PROCEDURE — 1159F MED LIST DOCD IN RCRD: CPT | Mod: ,,, | Performed by: NURSE PRACTITIONER

## 2024-01-04 PROCEDURE — 87880 STREP A ASSAY W/OPTIC: CPT | Mod: QW,,, | Performed by: NURSE PRACTITIONER

## 2024-01-04 PROCEDURE — 3078F DIAST BP <80 MM HG: CPT | Mod: ,,, | Performed by: NURSE PRACTITIONER

## 2024-01-04 PROCEDURE — 84443 ASSAY THYROID STIM HORMONE: CPT | Mod: ,,, | Performed by: CLINICAL MEDICAL LABORATORY

## 2024-01-04 PROCEDURE — 3008F BODY MASS INDEX DOCD: CPT | Mod: ,,, | Performed by: NURSE PRACTITIONER

## 2024-01-04 PROCEDURE — 3074F SYST BP LT 130 MM HG: CPT | Mod: ,,, | Performed by: NURSE PRACTITIONER

## 2024-01-04 PROCEDURE — 99214 OFFICE O/P EST MOD 30 MIN: CPT | Mod: ,,, | Performed by: NURSE PRACTITIONER

## 2024-01-04 PROCEDURE — 87426 SARSCOV CORONAVIRUS AG IA: CPT | Mod: QW,,, | Performed by: NURSE PRACTITIONER

## 2024-01-04 RX ORDER — PREDNISONE 20 MG/1
20 TABLET ORAL 2 TIMES DAILY
Qty: 10 TABLET | Refills: 0 | Status: SHIPPED | OUTPATIENT
Start: 2024-01-04

## 2024-01-04 RX ORDER — BACLOFEN 10 MG/1
10 TABLET ORAL
COMMUNITY

## 2024-01-04 RX ORDER — AZITHROMYCIN 250 MG/1
TABLET, FILM COATED ORAL
Qty: 6 TABLET | Refills: 0 | Status: SHIPPED | OUTPATIENT
Start: 2024-01-04
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
## 2024-01-04 NOTE — LETTER
January 4, 2024    Muna Newberry  [address] [place] [address]             [facility] Family [facility]  [address] [place] [address]  Phone: [phone]  Fax: [phone]   January 4, 2024     Patient: Muna Newberry   YOB: 1965   Date of Visit: 1/4/2024       To Whom it May Concern:    Muna Newberry was seen in my clinic on 1/4/2024. She may return to work on 01/05/2024 .    Please excuse her from any classes or work missed.    If you have any questions or concerns, please don't hesitate to call.    Sincerely,         Sharita Briggs, GRETCHENP

## 2024-01-06 RX ORDER — LEVOTHYROXINE SODIUM 88 UG/1
88 TABLET ORAL
Qty: 90 TABLET | Refills: 1 | Status: SHIPPED | OUTPATIENT
Start: 2024-01-06

## 2024-01-08 ENCOUNTER — OFFICE VISIT (OUTPATIENT)
Dept: ORTHOPEDICS | Facility: CLINIC | Age: 59
End: 2024-01-08
Payer: COMMERCIAL

## 2024-01-08 ENCOUNTER — HOSPITAL ENCOUNTER (OUTPATIENT)
Dept: RADIOLOGY | Facility: HOSPITAL | Age: 59
Discharge: HOME | End: 2024-01-08
Attending: ORTHOPAEDIC SURGERY
Payer: COMMERCIAL

## 2024-01-08 DIAGNOSIS — M25.512 LEFT SHOULDER PAIN, UNSPECIFIED CHRONICITY: ICD-10-CM

## 2024-01-08 DIAGNOSIS — M25.512 LEFT SHOULDER PAIN, UNSPECIFIED CHRONICITY: Primary | ICD-10-CM

## 2024-01-08 PROCEDURE — 73030 X-RAY EXAM OF SHOULDER: CPT | Mod: TC,LT

## 2024-01-08 PROCEDURE — 1160F RVW MEDS BY RX/DR IN RCRD: CPT | Mod: S$GLB,,, | Performed by: ORTHOPAEDIC SURGERY

## 2024-01-08 PROCEDURE — 73030 X-RAY EXAM OF SHOULDER: CPT | Mod: 26,LT,, | Performed by: ORTHOPAEDIC SURGERY

## 2024-01-08 PROCEDURE — 99214 OFFICE O/P EST MOD 30 MIN: CPT | Mod: S$GLB,,, | Performed by: ORTHOPAEDIC SURGERY

## 2024-01-08 PROCEDURE — 99213 OFFICE O/P EST LOW 20 MIN: CPT | Mod: PBBFAC | Performed by: ORTHOPAEDIC SURGERY

## 2024-01-08 PROCEDURE — 1159F MED LIST DOCD IN RCRD: CPT | Mod: S$GLB,,, | Performed by: ORTHOPAEDIC SURGERY

## 2024-01-08 NOTE — PROGRESS NOTES
CLINIC NOTE       Chief Complaint   Patient presents with    Left Shoulder - Pain        Muna Newberry is a 58 y.o. female seen today for evaluation of left shoulder pain.  Describes acute injury    Past Medical History:   Diagnosis Date    Herpes zoster without complication 12/20/2021    Hyperplastic rectal polyp 12/19/2016    Idiopathic thrombotic thrombocytopenic purpura     Osteoarthrosis     Osteoporosis     Personal history of colonic polyps 12/19/2016     Family History   Problem Relation Age of Onset    Heart disease Mother     Leukemia Father     Breast cancer Paternal Aunt      Current Outpatient Medications on File Prior to Visit   Medication Sig Dispense Refill    albuterol (PROVENTIL) 2.5 mg /3 mL (0.083 %) nebulizer solution Take 3 mLs (2.5 mg total) by nebulization every 6 (six) hours as needed for Wheezing. Rescue 90 mL 0    albuterol (VENTOLIN HFA) 90 mcg/actuation inhaler Inhale 2 puffs into the lungs every 6 (six) hours as needed for Wheezing. Rescue 18 g 5    azithromycin (Z-MAR) 250 MG tablet Take 2 tablets by mouth on day 1; Take 1 tablet by mouth on days 2-5 6 tablet 0    baclofen (LIORESAL) 10 MG tablet Take 10 mg by mouth as needed.      cyanocobalamin 1,000 mcg/mL injection 1,000 mcg every 28 days.      HYDROcodone-acetaminophen (NORCO) 7.5-325 mg per tablet TAKE 1 TABLET BY MOUTH EVERY 12 TO 24 HOURS.      ipratropium (ATROVENT) 21 mcg (0.03 %) nasal spray 2 sprays by Each Nostril route 2 (two) times daily. 30 mL 0    ondansetron (ZOFRAN) 4 MG tablet Take 1 tablet (4 mg total) by mouth every 6 (six) hours as needed for Nausea. 20 tablet 0    pantoprazole (PROTONIX) 40 MG tablet Take 40 mg by mouth daily as needed (prn).      predniSONE (DELTASONE) 20 MG tablet Take 1 tablet (20 mg total) by mouth 2 (two) times daily. 10 tablet 0    promethazine (PHENERGAN) 12.5 MG Tab Take 1 tablet (12.5 mg total) by mouth every 6 (six) hours as needed (nausea assoc. with migraines). 30 tablet 0     rimegepant (NURTEC) 75 mg odt Take 1 tablet (75 mg total) by mouth once as needed for Migraine. Place ODT tablet on the tongue; alternatively the ODT tablet may be placed under the tongue 16 tablet 2    SYNTHROID 88 mcg tablet Take 1 tablet (88 mcg total) by mouth before breakfast. Brand name only. 90 tablet 1    traMADoL (ULTRAM) 50 mg tablet Take 50 mg by mouth nightly. Take 1/2-1 tablet at bedtime daily prn.       No current facility-administered medications on file prior to visit.       ROS     There were no vitals filed for this visit.    Past Surgical History:   Procedure Laterality Date    APPENDECTOMY      BREAST BIOPSY      CHOLECYSTECTOMY      COLONOSCOPY  12/19/2016    Dr. Ed [facility] - [facility]    HYSTERECTOMY      OOPHORECTOMY      SPINAL CORD STIMULATOR IMPLANT      TONSILLECTOMY          Review of patient's allergies indicates:   Allergen Reactions    Phenergan [promethazine]      IV phenergan only    Sulfa (sulfonamide antibiotics) Blisters    Opioids - morphine analogues Rash    Penicillins Rash        Ortho Exam     Radiographic Examination:    Technique:    Findings:    Impression:   See Above    Assessment and Plan  Patient Active Problem List    Diagnosis Date Noted    Chronic pain of multiple joints 10/02/2023    Primary osteoarthritis involving multiple joints     Cobalamin deficiency     Occipital neuralgia of left side 06/01/2023    Restless legs syndrome 12/27/2022    Idiopathic chronic pancreatitis 12/27/2022    Iron deficiency anemia 12/27/2022    Chronic migraine with aura 12/27/2022    Primary osteoarthritis of left hand 12/06/2021    Acquired hypothyroidism 04/21/2021    Osteoporosis 04/21/2021    Vitamin D deficiency 04/21/2021    Mild intermittent asthma without complication 04/21/2021    Chronic sinusitis 01/25/2021    Chronic pain 06/13/2020          Salomon Sosa M.D.

## 2024-01-08 NOTE — LETTER
January 8, 2024      [facility]  [address] [place] [address]  Phone: [phone]  Fax: [phone]       Patient: Muna Newberry   YOB: 1965  Date of Visit: 01/08/2024    To Whom It May Concern:    Ant Newberry  was at [facility] on 01/08/2024. The patient may return to work/school on 1/8/24 with no restrictions. If you have any questions or concerns, or if I can be of further assistance, please do not hesitate to contact me.    Sincerely,  Dr. Sosa/  Sonia Restrepo RN
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
## 2024-01-24 ENCOUNTER — OFFICE VISIT (OUTPATIENT)
Dept: ORTHOPEDICS | Facility: CLINIC | Age: 59
End: 2024-01-24
Payer: COMMERCIAL

## 2024-01-24 DIAGNOSIS — S46.012D TRAUMATIC INCOMPLETE TEAR OF LEFT ROTATOR CUFF, SUBSEQUENT ENCOUNTER: Primary | ICD-10-CM

## 2024-01-24 PROBLEM — S46.012A TRAUMATIC INCOMPLETE TEAR OF LEFT ROTATOR CUFF: Status: ACTIVE | Noted: 2024-01-24

## 2024-01-24 PROCEDURE — 99214 OFFICE O/P EST MOD 30 MIN: CPT | Mod: S$GLB,,, | Performed by: ORTHOPAEDIC SURGERY

## 2024-01-24 PROCEDURE — 99213 OFFICE O/P EST LOW 20 MIN: CPT | Mod: PBBFAC | Performed by: ORTHOPAEDIC SURGERY

## 2024-01-24 PROCEDURE — 1160F RVW MEDS BY RX/DR IN RCRD: CPT | Mod: S$GLB,,, | Performed by: ORTHOPAEDIC SURGERY

## 2024-01-24 PROCEDURE — 1159F MED LIST DOCD IN RCRD: CPT | Mod: S$GLB,,, | Performed by: ORTHOPAEDIC SURGERY

## 2024-01-24 NOTE — PROGRESS NOTES
CLINIC NOTE       Chief Complaint   Patient presents with    Results        Muna Newberry is a 58 y.o. female seen today for recheck of her left elbow.  She is experienced missed improvement in symptoms in the intervening time.  She was able to abduct 90°.  An MRI examination of the left shoulder obtained 01/15/2024 shows partial-thickness tearing of the supraspinatus tendon.  We have discussed impingement and the traumatic onset of symptoms exacerbating patella underlying condition.  She is hoping to avoid surgical intervention.  Subacromial steroid injection was recommended.  The left shoulder was prepped with Betadine a subacromial steroid injection performed with triamcinolone and Marcaine 1 cc each.  We discussed subacromial decompression using models and rupture.  She was to return if symptoms persist or worsen      Past Medical History:   Diagnosis Date    Herpes zoster without complication 12/20/2021    Hyperplastic rectal polyp 12/19/2016    Idiopathic thrombotic thrombocytopenic purpura     Osteoarthrosis     Osteoporosis     Personal history of colonic polyps 12/19/2016     Family History   Problem Relation Age of Onset    Heart disease Mother     Leukemia Father     Breast cancer Paternal Aunt      Current Outpatient Medications on File Prior to Visit   Medication Sig Dispense Refill    albuterol (PROVENTIL) 2.5 mg /3 mL (0.083 %) nebulizer solution Take 3 mLs (2.5 mg total) by nebulization every 6 (six) hours as needed for Wheezing. Rescue 90 mL 0    albuterol (VENTOLIN HFA) 90 mcg/actuation inhaler Inhale 2 puffs into the lungs every 6 (six) hours as needed for Wheezing. Rescue 18 g 5    azithromycin (Z-MAR) 250 MG tablet Take 2 tablets by mouth on day 1; Take 1 tablet by mouth on days 2-5 6 tablet 0    baclofen (LIORESAL) 10 MG tablet Take 10 mg by mouth as needed.      cyanocobalamin 1,000 mcg/mL injection 1,000 mcg every 28 days.      HYDROcodone-acetaminophen (NORCO) 7.5-325 mg per tablet  TAKE 1 TABLET BY MOUTH EVERY 12 TO 24 HOURS.      ipratropium (ATROVENT) 21 mcg (0.03 %) nasal spray 2 sprays by Each Nostril route 2 (two) times daily. 30 mL 0    ondansetron (ZOFRAN) 4 MG tablet Take 1 tablet (4 mg total) by mouth every 6 (six) hours as needed for Nausea. 20 tablet 0    pantoprazole (PROTONIX) 40 MG tablet Take 40 mg by mouth daily as needed (prn).      predniSONE (DELTASONE) 20 MG tablet Take 1 tablet (20 mg total) by mouth 2 (two) times daily. 10 tablet 0    promethazine (PHENERGAN) 12.5 MG Tab Take 1 tablet (12.5 mg total) by mouth every 6 (six) hours as needed (nausea assoc. with migraines). 30 tablet 0    rimegepant (NURTEC) 75 mg odt Take 1 tablet (75 mg total) by mouth once as needed for Migraine. Place ODT tablet on the tongue; alternatively the ODT tablet may be placed under the tongue 16 tablet 2    SYNTHROID 88 mcg tablet Take 1 tablet (88 mcg total) by mouth before breakfast. Brand name only. 90 tablet 1    traMADoL (ULTRAM) 50 mg tablet Take 50 mg by mouth nightly. Take 1/2-1 tablet at bedtime daily prn.       No current facility-administered medications on file prior to visit.       ROS     There were no vitals filed for this visit.    Past Surgical History:   Procedure Laterality Date    APPENDECTOMY      BREAST BIOPSY      CHOLECYSTECTOMY      COLONOSCOPY  12/19/2016    Dr. Ed [facility] - [facility]    HYSTERECTOMY      OOPHORECTOMY      SPINAL CORD STIMULATOR IMPLANT      TONSILLECTOMY          Review of patient's allergies indicates:   Allergen Reactions    Phenergan [promethazine]      IV phenergan only    Sulfa (sulfonamide antibiotics) Blisters    Opioids - morphine analogues Rash    Penicillins Rash        Ortho Exam     Radiographic Examination:    Technique:    Findings:    Impression:   See Above    Assessment and Plan  Patient Active Problem List    Diagnosis Date Noted    Left shoulder pain 01/08/2024    Chronic pain of multiple joints 10/02/2023    Primary  osteoarthritis involving multiple joints     Cobalamin deficiency     Occipital neuralgia of left side 06/01/2023    Restless legs syndrome 12/27/2022    Idiopathic chronic pancreatitis 12/27/2022    Iron deficiency anemia 12/27/2022    Chronic migraine with aura 12/27/2022    Primary osteoarthritis of left hand 12/06/2021    Acquired hypothyroidism 04/21/2021    Osteoporosis 04/21/2021    Vitamin D deficiency 04/21/2021    Mild intermittent asthma without complication 04/21/2021    Chronic sinusitis 01/25/2021    Chronic pain 06/13/2020          Salomon Sosa M.D.

## 2024-02-19 ENCOUNTER — OFFICE VISIT (OUTPATIENT)
Dept: ORTHOPEDICS | Facility: CLINIC | Age: 59
End: 2024-02-19
Payer: COMMERCIAL

## 2024-02-19 DIAGNOSIS — M75.42 CORACOID IMPINGEMENT OF LEFT SHOULDER: ICD-10-CM

## 2024-02-19 DIAGNOSIS — Z01.810 PREOP CARDIOVASCULAR EXAM: ICD-10-CM

## 2024-02-19 DIAGNOSIS — Z01.811 PREOP RESPIRATORY EXAM: ICD-10-CM

## 2024-02-19 DIAGNOSIS — M75.42 IMPINGEMENT SYNDROME OF LEFT SHOULDER: ICD-10-CM

## 2024-02-19 DIAGNOSIS — Z01.812 PRE-PROCEDURE LAB EXAM: ICD-10-CM

## 2024-02-19 DIAGNOSIS — M25.819 SHOULDER IMPINGEMENT: Primary | ICD-10-CM

## 2024-02-19 PROCEDURE — 1159F MED LIST DOCD IN RCRD: CPT | Mod: S$GLB,,, | Performed by: ORTHOPAEDIC SURGERY

## 2024-02-19 PROCEDURE — 1160F RVW MEDS BY RX/DR IN RCRD: CPT | Mod: S$GLB,,, | Performed by: ORTHOPAEDIC SURGERY

## 2024-02-19 PROCEDURE — 99212 OFFICE O/P EST SF 10 MIN: CPT | Mod: PBBFAC | Performed by: ORTHOPAEDIC SURGERY

## 2024-02-19 PROCEDURE — 99214 OFFICE O/P EST MOD 30 MIN: CPT | Mod: S$GLB,,, | Performed by: ORTHOPAEDIC SURGERY

## 2024-02-19 NOTE — PROGRESS NOTES
CLINIC NOTE       Chief Complaint   Patient presents with    Left Shoulder - Pain        Muna Newberry is a 58 y.o. female seen today for recheck of her left shoulder.  She underwent subacromial steroid injection with short-lived and adequate relief of symptoms.  MRI previously performed showed partial-thickness tearing of the supraspinatus tendon.  We have discussed the option to proceed with left shoulder arthroscopic subacromial decompression.  The procedure was shown in details.  Potential benefits and risks of surgery outlined to include but not limited to bleeding, infection, damage to blood vessels and nerves, need for further surgery, other risks and complications including even death the patient wished to proceed.  We discussed outpatient general/regional block anesthesia.      Past Medical History:   Diagnosis Date    Herpes zoster without complication 12/20/2021    Hyperplastic rectal polyp 12/19/2016    Idiopathic thrombotic thrombocytopenic purpura     Osteoarthrosis     Osteoporosis     Personal history of colonic polyps 12/19/2016     Family History   Problem Relation Age of Onset    Heart disease Mother     Leukemia Father     Breast cancer Paternal Aunt      Current Outpatient Medications on File Prior to Visit   Medication Sig Dispense Refill    albuterol (PROVENTIL) 2.5 mg /3 mL (0.083 %) nebulizer solution Take 3 mLs (2.5 mg total) by nebulization every 6 (six) hours as needed for Wheezing. Rescue 90 mL 0    albuterol (VENTOLIN HFA) 90 mcg/actuation inhaler Inhale 2 puffs into the lungs every 6 (six) hours as needed for Wheezing. Rescue 18 g 5    azithromycin (Z-MAR) 250 MG tablet Take 2 tablets by mouth on day 1; Take 1 tablet by mouth on days 2-5 6 tablet 0    baclofen (LIORESAL) 10 MG tablet Take 10 mg by mouth as needed.      cyanocobalamin 1,000 mcg/mL injection 1,000 mcg every 28 days.      HYDROcodone-acetaminophen (NORCO) 7.5-325 mg per tablet TAKE 1 TABLET BY MOUTH EVERY 12 TO  24 HOURS.      ipratropium (ATROVENT) 21 mcg (0.03 %) nasal spray 2 sprays by Each Nostril route 2 (two) times daily. 30 mL 0    ondansetron (ZOFRAN) 4 MG tablet Take 1 tablet (4 mg total) by mouth every 6 (six) hours as needed for Nausea. 20 tablet 0    pantoprazole (PROTONIX) 40 MG tablet Take 40 mg by mouth daily as needed (prn).      predniSONE (DELTASONE) 20 MG tablet Take 1 tablet (20 mg total) by mouth 2 (two) times daily. 10 tablet 0    promethazine (PHENERGAN) 12.5 MG Tab Take 1 tablet (12.5 mg total) by mouth every 6 (six) hours as needed (nausea assoc. with migraines). 30 tablet 0    rimegepant (NURTEC) 75 mg odt Take 1 tablet (75 mg total) by mouth once as needed for Migraine. Place ODT tablet on the tongue; alternatively the ODT tablet may be placed under the tongue 16 tablet 2    SYNTHROID 88 mcg tablet Take 1 tablet (88 mcg total) by mouth before breakfast. Brand name only. 90 tablet 1    traMADoL (ULTRAM) 50 mg tablet Take 50 mg by mouth nightly. Take 1/2-1 tablet at bedtime daily prn.       No current facility-administered medications on file prior to visit.       ROS     There were no vitals filed for this visit.    Past Surgical History:   Procedure Laterality Date    APPENDECTOMY      BREAST BIOPSY      CHOLECYSTECTOMY      COLONOSCOPY  12/19/2016    Dr. Ed Adame [facility] - [facility]    HYSTERECTOMY      OOPHORECTOMY      SPINAL CORD STIMULATOR IMPLANT      TONSILLECTOMY          Review of patient's allergies indicates:   Allergen Reactions    Phenergan [promethazine]      IV phenergan only    Sulfa (sulfonamide antibiotics) Blisters    Opioids - morphine analogues Rash    Penicillins Rash        Ortho Exam     Radiographic Examination:    Technique:    Findings:    Impression:   See Above    Assessment and Plan  Patient Active Problem List    Diagnosis Date Noted    Traumatic incomplete tear of left rotator cuff 01/24/2024    Left shoulder pain 01/08/2024    Chronic pain of multiple joints  10/02/2023    Primary osteoarthritis involving multiple joints     Cobalamin deficiency     Occipital neuralgia of left side 06/01/2023    Restless legs syndrome 12/27/2022    Idiopathic chronic pancreatitis 12/27/2022    Iron deficiency anemia 12/27/2022    Chronic migraine with aura 12/27/2022    Primary osteoarthritis of left hand 12/06/2021    Acquired hypothyroidism 04/21/2021    Osteoporosis 04/21/2021    Vitamin D deficiency 04/21/2021    Mild intermittent asthma without complication 04/21/2021    Chronic sinusitis 01/25/2021    Chronic pain 06/13/2020          Salomon Sosa M.D.

## 2024-03-04 ENCOUNTER — OFFICE VISIT (OUTPATIENT)
Dept: FAMILY MEDICINE | Facility: CLINIC | Age: 59
End: 2024-03-04
Payer: COMMERCIAL

## 2024-03-04 VITALS
HEART RATE: 76 BPM | BODY MASS INDEX: 26.81 KG/M2 | SYSTOLIC BLOOD PRESSURE: 114 MMHG | RESPIRATION RATE: 20 BRPM | HEIGHT: 61 IN | WEIGHT: 142 LBS | DIASTOLIC BLOOD PRESSURE: 73 MMHG | OXYGEN SATURATION: 95 % | TEMPERATURE: 98 F

## 2024-03-04 DIAGNOSIS — R39.9 UTI SYMPTOMS: ICD-10-CM

## 2024-03-04 DIAGNOSIS — N30.90 CYSTITIS: Primary | ICD-10-CM

## 2024-03-04 LAB
BILIRUB SERPL-MCNC: NEGATIVE MG/DL
BLOOD URINE, POC: NEGATIVE
COLOR, POC UA: YELLOW
GLUCOSE UR QL STRIP: NEGATIVE
KETONES UR QL STRIP: NEGATIVE
LEUKOCYTE ESTERASE URINE, POC: NEGATIVE
NITRITE, POC UA: NEGATIVE
PH, POC UA: 6
PROTEIN, POC: NEGATIVE
SPECIFIC GRAVITY, POC UA: 1.01
UROBILINOGEN, POC UA: 0.2

## 2024-03-04 PROCEDURE — 99213 OFFICE O/P EST LOW 20 MIN: CPT | Mod: ,,, | Performed by: NURSE PRACTITIONER

## 2024-03-04 PROCEDURE — 81003 URINALYSIS AUTO W/O SCOPE: CPT | Mod: QW,,, | Performed by: CLINICAL MEDICAL LABORATORY

## 2024-03-04 PROCEDURE — 3008F BODY MASS INDEX DOCD: CPT | Mod: ,,, | Performed by: NURSE PRACTITIONER

## 2024-03-04 PROCEDURE — 1159F MED LIST DOCD IN RCRD: CPT | Mod: ,,, | Performed by: NURSE PRACTITIONER

## 2024-03-04 PROCEDURE — 81003 URINALYSIS AUTO W/O SCOPE: CPT | Mod: QW,,, | Performed by: NURSE PRACTITIONER

## 2024-03-04 PROCEDURE — 3078F DIAST BP <80 MM HG: CPT | Mod: ,,, | Performed by: NURSE PRACTITIONER

## 2024-03-04 PROCEDURE — 3074F SYST BP LT 130 MM HG: CPT | Mod: ,,, | Performed by: NURSE PRACTITIONER

## 2024-03-04 PROCEDURE — 1160F RVW MEDS BY RX/DR IN RCRD: CPT | Mod: ,,, | Performed by: NURSE PRACTITIONER

## 2024-03-04 RX ORDER — HYDROCODONE BITARTRATE AND ACETAMINOPHEN 5; 325 MG/1; MG/1
.5-1 TABLET ORAL NIGHTLY PRN
COMMUNITY

## 2024-03-04 RX ORDER — NITROFURANTOIN 25; 75 MG/1; MG/1
100 CAPSULE ORAL 2 TIMES DAILY
Qty: 14 CAPSULE | Refills: 0 | Status: SHIPPED | OUTPATIENT
Start: 2024-03-04 | End: 2024-03-11

## 2024-03-04 NOTE — PROGRESS NOTES
[facility] - FAMILY MEDICINE       PATIENT NAME: Muna Newberry   : 1965    AGE: 58 y.o. DATE OF ENCOUNTER: 3/4/24    MRN: [id number]      PCP: Sharita Briggs FNP    Reason for Visit / Chief Complaint:  Urinary Tract Infection (Patient presents to the clinic for possible uti its pressure when use the bathhroom)         274}    Subjective:     HPI:    Presents for urinary frequency x 3 days with suprapubic pressure and dysuria; L flank/low back discomfort since last night.    Saw Uro, Dr. Bautista, for recurrent UTIs in past and had cystoscope then UTIs resolved.     Having shoulder surgery next Tuesday for partial tear in shoulder.    Review of Systems:   Review of Systems   Constitutional: Negative.    Respiratory: Negative.     Cardiovascular: Negative.    Genitourinary:  Positive for dysuria, flank pain, frequency and pelvic pain. Negative for hematuria.   Musculoskeletal:  Positive for arthralgias.   Skin: Negative.    Neurological: Negative.      Allergies and Meds: 274}     Review of patient's allergies indicates:   Allergen Reactions    Phenergan [promethazine]      IV phenergan only    Sulfa (sulfonamide antibiotics) Blisters    Opioids - morphine analogues Rash    Penicillins Rash        Current Outpatient Medications:     albuterol (PROVENTIL) 2.5 mg /3 mL (0.083 %) nebulizer solution, Take 3 mLs (2.5 mg total) by nebulization every 6 (six) hours as needed for Wheezing. Rescue, Disp: 90 mL, Rfl: 0    albuterol (VENTOLIN HFA) 90 mcg/actuation inhaler, Inhale 2 puffs into the lungs every 6 (six) hours as needed for Wheezing. Rescue, Disp: 18 g, Rfl: 5    baclofen (LIORESAL) 10 MG tablet, Take 10 mg by mouth as needed., Disp: , Rfl:     cyanocobalamin 1,000 mcg/mL injection, 1,000 mcg every 28 days., Disp: , Rfl:     HYDROcodone-acetaminophen (NORCO) 5-325 mg per tablet, Take 0.5-1 tablets by mouth nightly as needed for Pain., Disp: , Rfl:     ipratropium (ATROVENT) 21 mcg  (0.03 %) nasal spray, 2 sprays by Each Nostril route 2 (two) times daily., Disp: 30 mL, Rfl: 0    ondansetron (ZOFRAN) 4 MG tablet, Take 1 tablet (4 mg total) by mouth every 6 (six) hours as needed for Nausea., Disp: 20 tablet, Rfl: 0    pantoprazole (PROTONIX) 40 MG tablet, Take 40 mg by mouth daily as needed (prn)., Disp: , Rfl:     predniSONE (DELTASONE) 20 MG tablet, Take 1 tablet (20 mg total) by mouth 2 (two) times daily., Disp: 10 tablet, Rfl: 0    promethazine (PHENERGAN) 12.5 MG Tab, Take 1 tablet (12.5 mg total) by mouth every 6 (six) hours as needed (nausea assoc. with migraines)., Disp: 30 tablet, Rfl: 0    rimegepant (NURTEC) 75 mg odt, Take 1 tablet (75 mg total) by mouth once as needed for Migraine. Place ODT tablet on the tongue; alternatively the ODT tablet may be placed under the tongue, Disp: 16 tablet, Rfl: 2    SYNTHROID 88 mcg tablet, Take 1 tablet (88 mcg total) by mouth before breakfast. Brand name only., Disp: 90 tablet, Rfl: 1    traMADoL (ULTRAM) 50 mg tablet, Take 50 mg by mouth nightly. Take 1/2-1 tablet at bedtime daily prn., Disp: , Rfl:     nitrofurantoin, macrocrystal-monohydrate, (MACROBID) 100 MG capsule, Take 1 capsule (100 mg total) by mouth 2 (two) times daily. for 7 days, Disp: 14 capsule, Rfl: 0    Medical History: 274}     Past Medical History:   Diagnosis Date    Herpes zoster without complication 12/20/2021    Hyperplastic rectal polyp 12/19/2016    Idiopathic thrombotic thrombocytopenic purpura     Osteoarthrosis     Osteoporosis     Personal history of colonic polyps 12/19/2016      Social History     Tobacco Use   Smoking Status Never   Smokeless Tobacco Never      Past Surgical History:   Procedure Laterality Date    APPENDECTOMY      BREAST BIOPSY      CHOLECYSTECTOMY      COLONOSCOPY  12/19/2016    Dr. Ed [facility]ame [facility] - [facility]    HYSTERECTOMY      OOPHORECTOMY      SPINAL CORD STIMULATOR IMPLANT      TONSILLECTOMY          Health Maintenance: 274}     Health  "Maintenance         Date Due Completion Date    COVID-19 Vaccine (1) Never done ---    HIV Screening Never done ---    TETANUS VACCINE Never done ---    Shingles Vaccine (1 of 2) Never done ---    Mammogram 09/12/2023 9/12/2022    DEXA Scan 12/30/2023 12/30/2020    Lipid Panel 10/02/2024 10/2/2023    Hemoglobin A1c (Diabetic Prevention Screening) 10/02/2026 10/2/2023    Colorectal Cancer Screening 12/19/2026 12/19/2016          Immunization History   Administered Date(s) Administered    Influenza 10/17/2003     Objective:  274}   /73 (BP Location: Right arm, Patient Position: Sitting, BP Method: Medium (Automatic))   Pulse 76   Temp 97.7 °F (36.5 °C) (Oral)   Resp 20   Ht 5' 1" (1.549 m)   Wt 64.4 kg (142 lb)   SpO2 95%   BMI 26.83 kg/m²     Wt Readings from Last 3 Encounters:   03/04/24 64.4 kg (142 lb)   01/04/24 65.3 kg (144 lb)   12/30/23 62.6 kg (138 lb)     BP Readings from Last 3 Encounters:   03/04/24 114/73   01/04/24 98/64   12/30/23 (!) 120/50     Body mass index is 26.83 kg/m².     Physical Exam  Vitals and nursing note reviewed.   Constitutional:       General: She is not in acute distress.     Appearance: Normal appearance. She is not ill-appearing.   HENT:      Head: Normocephalic.   Eyes:      Conjunctiva/sclera: Conjunctivae normal.   Cardiovascular:      Rate and Rhythm: Normal rate and regular rhythm.      Heart sounds: Normal heart sounds.   Pulmonary:      Effort: Pulmonary effort is normal. No respiratory distress.      Breath sounds: Normal breath sounds.   Abdominal:      Palpations: Abdomen is soft.      Tenderness: There is abdominal tenderness (suprapubic TTP). There is left CVA tenderness. There is no right CVA tenderness.   Skin:     General: Skin is warm and dry.      Coloration: Skin is not jaundiced or pale.   Neurological:      Mental Status: She is alert and oriented to person, place, and time.      Gait: Gait normal.   Psychiatric:         Mood and Affect: Mood normal. "         Behavior: Behavior normal.         Thought Content: Thought content normal.         Judgment: Judgment normal.          Assessment and Plan: 274}     1. Cystitis  -     POCT URINALYSIS W/O SCOPE  -     Urinalysis, Reflex to Urine Culture; Future; Expected date: 03/04/2024  -     nitrofurantoin, macrocrystal-monohydrate, (MACROBID) 100 MG capsule; Take 1 capsule (100 mg total) by mouth 2 (two) times daily. for 7 days  Dispense: 14 capsule; Refill: 0    2. UTI symptoms       Due to history of recurrent UTIs and upcoming surgery, start Macrobid for cystitis.  Culture pending.  Increase water intake.  Has AZO at home if needed.    Return to clinic as scheduled; and sooner as needed.    Future Appointments   Date Time Provider Department Center   4/9/2024  3:40 PM Sharita Briggs FNP [facility]   10/3/2024  3:40 PM Sharita Briggs FNP [facility]        Signature:  TOMER Dennis

## 2024-03-05 LAB
BILIRUB UR QL STRIP: NEGATIVE
CLARITY UR: CLEAR
COLOR UR: NORMAL
GLUCOSE UR STRIP-MCNC: NORMAL MG/DL
KETONES UR STRIP-SCNC: NEGATIVE MG/DL
LEUKOCYTE ESTERASE UR QL STRIP: NEGATIVE
NITRITE UR QL STRIP: NEGATIVE
PH UR STRIP: 6 PH UNITS
PROT UR QL STRIP: NEGATIVE
RBC # UR STRIP: NEGATIVE /UL
SP GR UR STRIP: 1.01
UROBILINOGEN UR STRIP-ACNC: NORMAL MG/DL

## 2024-03-06 NOTE — PROGRESS NOTES
Call pt and review results. Her urine didn't show anything so a culture will not be performed.  She can complete the nitrofurantoin for bladder infection and will need further evaluation if her s/s persist after treatment.

## 2024-03-07 ENCOUNTER — CLINICAL SUPPORT (OUTPATIENT)
Dept: CARDIOLOGY | Facility: CLINIC | Age: 59
End: 2024-03-07
Payer: COMMERCIAL

## 2024-03-07 ENCOUNTER — HOSPITAL ENCOUNTER (OUTPATIENT)
Dept: RADIOLOGY | Facility: HOSPITAL | Age: 59
Discharge: HOME | End: 2024-03-07
Attending: ORTHOPAEDIC SURGERY
Payer: COMMERCIAL

## 2024-03-07 DIAGNOSIS — Z01.811 PREOP RESPIRATORY EXAM: ICD-10-CM

## 2024-03-07 DIAGNOSIS — Z01.810 PREOP CARDIOVASCULAR EXAM: ICD-10-CM

## 2024-03-07 PROCEDURE — 93000 ELECTROCARDIOGRAM COMPLETE: CPT | Mod: S$GLB,,, | Performed by: INTERNAL MEDICINE

## 2024-03-07 PROCEDURE — 71046 X-RAY EXAM CHEST 2 VIEWS: CPT | Mod: 26,,, | Performed by: RADIOLOGY

## 2024-03-07 PROCEDURE — 71046 X-RAY EXAM CHEST 2 VIEWS: CPT | Mod: TC

## 2024-03-07 PROCEDURE — 99212 OFFICE O/P EST SF 10 MIN: CPT | Mod: PBBFAC,25

## 2024-03-08 LAB
OHS QRS DURATION: 76 MS
OHS QTC CALCULATION: 401 MS

## 2024-03-12 ENCOUNTER — ANESTHESIA EVENT (OUTPATIENT)
Dept: SURGERY | Facility: HOSPITAL | Age: 59
End: 2024-03-12
Payer: COMMERCIAL

## 2024-03-12 ENCOUNTER — ANESTHESIA (OUTPATIENT)
Dept: SURGERY | Facility: HOSPITAL | Age: 59
End: 2024-03-12
Payer: COMMERCIAL

## 2024-03-12 ENCOUNTER — HOSPITAL ENCOUNTER (OUTPATIENT)
Facility: HOSPITAL | Age: 59
Discharge: HOME | End: 2024-03-12
Attending: ORTHOPAEDIC SURGERY | Admitting: ORTHOPAEDIC SURGERY
Payer: COMMERCIAL

## 2024-03-12 VITALS
HEIGHT: 61 IN | SYSTOLIC BLOOD PRESSURE: 107 MMHG | DIASTOLIC BLOOD PRESSURE: 54 MMHG | OXYGEN SATURATION: 98 % | RESPIRATION RATE: 18 BRPM | WEIGHT: 140 LBS | HEART RATE: 63 BPM | BODY MASS INDEX: 26.43 KG/M2 | TEMPERATURE: 98 F

## 2024-03-12 DIAGNOSIS — J45.909 ASTHMA, UNSPECIFIED ASTHMA SEVERITY, UNSPECIFIED WHETHER COMPLICATED, UNSPECIFIED WHETHER PERSISTENT: ICD-10-CM

## 2024-03-12 DIAGNOSIS — M25.819 SHOULDER IMPINGEMENT: Primary | ICD-10-CM

## 2024-03-12 PROCEDURE — D9220A PRA ANESTHESIA: Mod: ANES,,, | Performed by: ANESTHESIOLOGY

## 2024-03-12 PROCEDURE — 27201423 OPTIME MED/SURG SUP & DEVICES STERILE SUPPLY: Performed by: ORTHOPAEDIC SURGERY

## 2024-03-12 PROCEDURE — 27200700 HC TUBE, ENDOTRACHEAL NASAL RAE: Performed by: NURSE ANESTHETIST, CERTIFIED REGISTERED

## 2024-03-12 PROCEDURE — 71000033 HC RECOVERY, INTIAL HOUR: Performed by: ORTHOPAEDIC SURGERY

## 2024-03-12 PROCEDURE — 29826 SHO ARTHRS SRG DECOMPRESSION: CPT | Mod: LT,,, | Performed by: ORTHOPAEDIC SURGERY

## 2024-03-12 PROCEDURE — D9220A PRA ANESTHESIA: Mod: CRNA,,, | Performed by: NURSE ANESTHETIST, CERTIFIED REGISTERED

## 2024-03-12 PROCEDURE — 63600175 PHARM REV CODE 636 W HCPCS: Mod: JZ,JG | Performed by: ORTHOPAEDIC SURGERY

## 2024-03-12 PROCEDURE — 29824 SHO ARTHRS SRG DSTL CLAVICLC: CPT | Mod: LT,,, | Performed by: ORTHOPAEDIC SURGERY

## 2024-03-12 PROCEDURE — 36000711: Performed by: ORTHOPAEDIC SURGERY

## 2024-03-12 PROCEDURE — 27000689 HC BLADE LARYNGOSCOPE ANY SIZE: Performed by: NURSE ANESTHETIST, CERTIFIED REGISTERED

## 2024-03-12 PROCEDURE — 27202344 HC EYESHIELD: Performed by: NURSE ANESTHETIST, CERTIFIED REGISTERED

## 2024-03-12 PROCEDURE — 36000710: Performed by: ORTHOPAEDIC SURGERY

## 2024-03-12 PROCEDURE — 37000009 HC ANESTHESIA EA ADD 15 MINS: Performed by: ORTHOPAEDIC SURGERY

## 2024-03-12 PROCEDURE — 71000015 HC POSTOP RECOV 1ST HR: Performed by: ORTHOPAEDIC SURGERY

## 2024-03-12 PROCEDURE — 01630 ANES OPN/ARTHR PX SHO JT NOS: CPT | Performed by: ORTHOPAEDIC SURGERY

## 2024-03-12 PROCEDURE — 27000716 HC OXISENSOR PROBE, ANY SIZE: Performed by: NURSE ANESTHETIST, CERTIFIED REGISTERED

## 2024-03-12 PROCEDURE — 63600175 PHARM REV CODE 636 W HCPCS

## 2024-03-12 PROCEDURE — 25000003 PHARM REV CODE 250: Performed by: NURSE ANESTHETIST, CERTIFIED REGISTERED

## 2024-03-12 PROCEDURE — 63600175 PHARM REV CODE 636 W HCPCS: Performed by: ANESTHESIOLOGY

## 2024-03-12 PROCEDURE — 37000008 HC ANESTHESIA 1ST 15 MINUTES: Performed by: ORTHOPAEDIC SURGERY

## 2024-03-12 PROCEDURE — 27000165 HC TUBE, ETT CUFFED: Performed by: NURSE ANESTHETIST, CERTIFIED REGISTERED

## 2024-03-12 PROCEDURE — 27200750 HC INSULATED NEEDLE/ STIMUPLEX: Performed by: NURSE ANESTHETIST, CERTIFIED REGISTERED

## 2024-03-12 PROCEDURE — 27000510 HC BLANKET BAIR HUGGER ANY SIZE: Performed by: NURSE ANESTHETIST, CERTIFIED REGISTERED

## 2024-03-12 PROCEDURE — 27000655: Performed by: NURSE ANESTHETIST, CERTIFIED REGISTERED

## 2024-03-12 PROCEDURE — 25000003 PHARM REV CODE 250: Performed by: ANESTHESIOLOGY

## 2024-03-12 PROCEDURE — 25000003 PHARM REV CODE 250: Performed by: ORTHOPAEDIC SURGERY

## 2024-03-12 PROCEDURE — 63600175 PHARM REV CODE 636 W HCPCS: Performed by: NURSE ANESTHETIST, CERTIFIED REGISTERED

## 2024-03-12 PROCEDURE — 71000016 HC POSTOP RECOV ADDL HR: Performed by: ORTHOPAEDIC SURGERY

## 2024-03-12 RX ORDER — EPINEPHRINE 1 MG/ML
INJECTION, SOLUTION, CONCENTRATE INTRAVENOUS
Status: DISCONTINUED | OUTPATIENT
Start: 2024-03-12 | End: 2024-03-12 | Stop reason: HOSPADM

## 2024-03-12 RX ORDER — ROCURONIUM BROMIDE 10 MG/ML
INJECTION, SOLUTION INTRAVENOUS
Status: DISCONTINUED | OUTPATIENT
Start: 2024-03-12 | End: 2024-03-12

## 2024-03-12 RX ORDER — PROMETHAZINE HYDROCHLORIDE 25 MG/1
25 TABLET ORAL EVERY 6 HOURS PRN
Status: CANCELLED | OUTPATIENT
Start: 2024-03-12

## 2024-03-12 RX ORDER — IPRATROPIUM BROMIDE AND ALBUTEROL SULFATE 2.5; .5 MG/3ML; MG/3ML
3 SOLUTION RESPIRATORY (INHALATION) ONCE
Status: DISCONTINUED | OUTPATIENT
Start: 2024-03-12 | End: 2024-03-12 | Stop reason: HOSPADM

## 2024-03-12 RX ORDER — ONDANSETRON 4 MG/1
8 TABLET, ORALLY DISINTEGRATING ORAL EVERY 8 HOURS PRN
Status: DISCONTINUED | OUTPATIENT
Start: 2024-03-12 | End: 2024-03-12 | Stop reason: HOSPADM

## 2024-03-12 RX ORDER — MEPERIDINE HYDROCHLORIDE 25 MG/ML
25 INJECTION INTRAMUSCULAR; INTRAVENOUS; SUBCUTANEOUS EVERY 10 MIN PRN
Status: DISCONTINUED | OUTPATIENT
Start: 2024-03-12 | End: 2024-03-12 | Stop reason: HOSPADM

## 2024-03-12 RX ORDER — ACETAMINOPHEN 500 MG
1000 TABLET ORAL EVERY 6 HOURS PRN
Status: DISCONTINUED | OUTPATIENT
Start: 2024-03-12 | End: 2024-03-12 | Stop reason: HOSPADM

## 2024-03-12 RX ORDER — SODIUM CHLORIDE 9 MG/ML
INJECTION, SOLUTION INTRAVENOUS CONTINUOUS
Status: DISCONTINUED | OUTPATIENT
Start: 2024-03-12 | End: 2024-03-12 | Stop reason: HOSPADM

## 2024-03-12 RX ORDER — BUPIVACAINE HYDROCHLORIDE 2.5 MG/ML
INJECTION, SOLUTION EPIDURAL; INFILTRATION; INTRACAUDAL
Status: DISCONTINUED | OUTPATIENT
Start: 2024-03-12 | End: 2024-03-12 | Stop reason: HOSPADM

## 2024-03-12 RX ORDER — PROPOFOL 10 MG/ML
VIAL (ML) INTRAVENOUS
Status: DISCONTINUED | OUTPATIENT
Start: 2024-03-12 | End: 2024-03-12

## 2024-03-12 RX ORDER — DIAZEPAM 5 MG/1
10 TABLET ORAL ONCE
Status: COMPLETED | OUTPATIENT
Start: 2024-03-12 | End: 2024-03-12

## 2024-03-12 RX ORDER — DEXAMETHASONE SODIUM PHOSPHATE 4 MG/ML
INJECTION, SOLUTION INTRA-ARTICULAR; INTRALESIONAL; INTRAMUSCULAR; INTRAVENOUS; SOFT TISSUE
Status: DISCONTINUED | OUTPATIENT
Start: 2024-03-12 | End: 2024-03-12

## 2024-03-12 RX ORDER — DIMENHYDRINATE 50 MG
50 TABLET ORAL ONCE
Status: COMPLETED | OUTPATIENT
Start: 2024-03-12 | End: 2024-03-12

## 2024-03-12 RX ORDER — PHENYLEPHRINE HYDROCHLORIDE 10 MG/ML
INJECTION INTRAVENOUS
Status: DISCONTINUED | OUTPATIENT
Start: 2024-03-12 | End: 2024-03-12

## 2024-03-12 RX ORDER — DIPHENHYDRAMINE HYDROCHLORIDE 50 MG/ML
INJECTION INTRAMUSCULAR; INTRAVENOUS
Status: DISCONTINUED | OUTPATIENT
Start: 2024-03-12 | End: 2024-03-12

## 2024-03-12 RX ORDER — ONDANSETRON 4 MG/1
4 TABLET, FILM COATED ORAL ONCE
Status: COMPLETED | OUTPATIENT
Start: 2024-03-12 | End: 2024-03-12

## 2024-03-12 RX ORDER — HYDROMORPHONE HYDROCHLORIDE 2 MG/ML
0.5 INJECTION, SOLUTION INTRAMUSCULAR; INTRAVENOUS; SUBCUTANEOUS EVERY 5 MIN PRN
Status: DISCONTINUED | OUTPATIENT
Start: 2024-03-12 | End: 2024-03-12 | Stop reason: HOSPADM

## 2024-03-12 RX ORDER — CLINDAMYCIN PHOSPHATE 900 MG/50ML
INJECTION, SOLUTION INTRAVENOUS
Status: DISCONTINUED | OUTPATIENT
Start: 2024-03-12 | End: 2024-03-12

## 2024-03-12 RX ORDER — FENTANYL CITRATE 50 UG/ML
INJECTION, SOLUTION INTRAMUSCULAR; INTRAVENOUS
Status: DISCONTINUED | OUTPATIENT
Start: 2024-03-12 | End: 2024-03-12

## 2024-03-12 RX ORDER — HYDROCODONE BITARTRATE AND ACETAMINOPHEN 10; 325 MG/1; MG/1
1 TABLET ORAL EVERY 4 HOURS PRN
Status: CANCELLED | OUTPATIENT
Start: 2024-03-12

## 2024-03-12 RX ORDER — HYDROCODONE BITARTRATE AND ACETAMINOPHEN 5; 325 MG/1; MG/1
1 TABLET ORAL EVERY 6 HOURS PRN
Qty: 28 TABLET | Refills: 0 | Status: SHIPPED | OUTPATIENT
Start: 2024-03-12 | End: 2024-03-19

## 2024-03-12 RX ORDER — HYDROCODONE BITARTRATE AND ACETAMINOPHEN 5; 325 MG/1; MG/1
1 TABLET ORAL EVERY 4 HOURS PRN
Status: CANCELLED | OUTPATIENT
Start: 2024-03-12

## 2024-03-12 RX ORDER — MIDAZOLAM HYDROCHLORIDE 1 MG/ML
INJECTION INTRAMUSCULAR; INTRAVENOUS
Status: DISCONTINUED | OUTPATIENT
Start: 2024-03-12 | End: 2024-03-12

## 2024-03-12 RX ORDER — DIPHENHYDRAMINE HYDROCHLORIDE 50 MG/ML
25 INJECTION INTRAMUSCULAR; INTRAVENOUS EVERY 6 HOURS PRN
Status: DISCONTINUED | OUTPATIENT
Start: 2024-03-12 | End: 2024-03-12 | Stop reason: HOSPADM

## 2024-03-12 RX ORDER — LIDOCAINE HYDROCHLORIDE 20 MG/ML
INJECTION, SOLUTION EPIDURAL; INFILTRATION; INTRACAUDAL; PERINEURAL
Status: DISCONTINUED | OUTPATIENT
Start: 2024-03-12 | End: 2024-03-12

## 2024-03-12 RX ORDER — ONDANSETRON HYDROCHLORIDE 2 MG/ML
4 INJECTION, SOLUTION INTRAVENOUS DAILY PRN
Status: DISCONTINUED | OUTPATIENT
Start: 2024-03-12 | End: 2024-03-12 | Stop reason: HOSPADM

## 2024-03-12 RX ORDER — GLYCOPYRROLATE 0.2 MG/ML
INJECTION INTRAMUSCULAR; INTRAVENOUS
Status: DISCONTINUED | OUTPATIENT
Start: 2024-03-12 | End: 2024-03-12

## 2024-03-12 RX ORDER — FLUMAZENIL 0.1 MG/ML
INJECTION INTRAVENOUS
Status: DISCONTINUED | OUTPATIENT
Start: 2024-03-12 | End: 2024-03-12

## 2024-03-12 RX ORDER — ONDANSETRON HYDROCHLORIDE 2 MG/ML
INJECTION, SOLUTION INTRAVENOUS
Status: DISCONTINUED | OUTPATIENT
Start: 2024-03-12 | End: 2024-03-12

## 2024-03-12 RX ADMIN — ONDANSETRON 4 MG: 2 INJECTION INTRAMUSCULAR; INTRAVENOUS at 10:03

## 2024-03-12 RX ADMIN — PHENYLEPHRINE HYDROCHLORIDE 100 MCG: 10 INJECTION INTRAVENOUS at 11:03

## 2024-03-12 RX ADMIN — CLINDAMYCIN IN 5 PERCENT DEXTROSE 900 MG: 18 INJECTION, SOLUTION INTRAVENOUS at 10:03

## 2024-03-12 RX ADMIN — ONDANSETRON 4 MG: 2 INJECTION INTRAMUSCULAR; INTRAVENOUS at 11:03

## 2024-03-12 RX ADMIN — FLUMAZENIL 0.2 MG: 0.1 INJECTION, SOLUTION INTRAVENOUS at 12:03

## 2024-03-12 RX ADMIN — LIDOCAINE HYDROCHLORIDE 100 MG: 20 INJECTION, SOLUTION INTRAVENOUS at 10:03

## 2024-03-12 RX ADMIN — GLYCOPYRROLATE 0.2 MG: 0.2 INJECTION INTRAMUSCULAR; INTRAVENOUS at 10:03

## 2024-03-12 RX ADMIN — DIPHENHYDRAMINE HYDROCHLORIDE 50 MG: 50 INJECTION, SOLUTION INTRAMUSCULAR; INTRAVENOUS at 11:03

## 2024-03-12 RX ADMIN — MIDAZOLAM 2 MG: 1 INJECTION INTRAMUSCULAR; INTRAVENOUS at 10:03

## 2024-03-12 RX ADMIN — SODIUM CHLORIDE: 9 INJECTION, SOLUTION INTRAVENOUS at 07:03

## 2024-03-12 RX ADMIN — PHENYLEPHRINE HYDROCHLORIDE 100 MCG: 10 INJECTION INTRAVENOUS at 10:03

## 2024-03-12 RX ADMIN — DIAZEPAM 10 MG: 5 TABLET ORAL at 09:03

## 2024-03-12 RX ADMIN — DEXAMETHASONE SODIUM PHOSPHATE 8 MG: 4 INJECTION, SOLUTION INTRA-ARTICULAR; INTRALESIONAL; INTRAMUSCULAR; INTRAVENOUS; SOFT TISSUE at 10:03

## 2024-03-12 RX ADMIN — ONDANSETRON HYDROCHLORIDE 4 MG: 4 TABLET, FILM COATED ORAL at 09:03

## 2024-03-12 RX ADMIN — SUGAMMADEX 200 MG: 100 INJECTION, SOLUTION INTRAVENOUS at 11:03

## 2024-03-12 RX ADMIN — ROPIVACAINE HYDROCHLORIDE 30 ML: 7.5 INJECTION, SOLUTION EPIDURAL; PERINEURAL at 10:03

## 2024-03-12 RX ADMIN — PROPOFOL 150 MG: 10 INJECTION, EMULSION INTRAVENOUS at 10:03

## 2024-03-12 RX ADMIN — ROCURONIUM BROMIDE 50 MG: 10 INJECTION, SOLUTION INTRAVENOUS at 10:03

## 2024-03-12 RX ADMIN — DIMENHYDRINATE 50 MG: 50 TABLET ORAL at 09:03

## 2024-03-12 RX ADMIN — FENTANYL CITRATE 100 MCG: 50 INJECTION INTRAMUSCULAR; INTRAVENOUS at 10:03

## 2024-03-12 NOTE — TRANSFER OF CARE
"Anesthesia Transfer of Care Note    Patient: Muna Newberry    Procedure(s) Performed: Procedure(s) (LRB):  ARTHROSCOPY, SHOULDER (Left)  ARTHROSCOPY, SHOULDER, WITH SUBACROMIAL SPACE DECOMPRESSION (Left)  DEBRIDEMENT, SHOULDER, ARTHROSCOPIC    Patient location: PACU    Anesthesia Type: general and regional    Transport from OR: Transported from OR on room air with adequate spontaneous ventilation    Post pain: adequate analgesia    Post assessment: no apparent anesthetic complications and tolerated procedure well    Post vital signs: stable    Level of consciousness: responds to stimulation and sedated    Nausea/Vomiting: no nausea/vomiting    Complications: none    Transfer of care protocol was followed      Last vitals: Visit Vitals  /68 (BP Location: Right arm, Patient Position: Lying)   Pulse 70   Temp 36.4 °C (97.5 °F)   Resp (!) 9   Ht 5' 1" (1.549 m)   Wt 63.5 kg (140 lb)   SpO2 100%   Breastfeeding No   BMI 26.45 kg/m²     "

## 2024-03-12 NOTE — H&P
[facility] - Orthopedic Periop Services  Orthopedics  H&P    Patient Name: Muna Newberry  MRN: [id number]  Admission Date: (Not on file)  Primary Care Provider: Sharita Briggs FNP    Patient information was obtained from patient and ER records.     Subjective:     Principal Problem:Shoulder impingement    Chief Complaint: No chief complaint on file.       HPI:    Muna Newberry is a 58 y.o. female seen today for recheck of her left shoulder.  She underwent subacromial steroid injection with short-lived and adequate relief of symptoms.  MRI previously performed showed partial-thickness tearing of the supraspinatus tendon.  We have discussed the option to proceed with left shoulder arthroscopic subacromial decompression.  The procedure was shown in details.  Potential benefits and risks of surgery outlined to include but not limited to bleeding, infection, damage to blood vessels and nerves, need for further surgery, other risks and complications including even death the patient wished to proceed.  We discussed outpatient general/regional block anesthesia.  Impression:  Impingement syndrome-left shoulder   Plan:  Left shoulder arthroscopic subacromial decompression               No new subjective & objective note has been filed under this hospital service since the last note was generated.    Assessment/Plan:     No notes have been filed under this hospital service.  Service: Orthopedic Surgery      Salomon Sosa MD  Orthopedics  [facility] - [facility]

## 2024-03-12 NOTE — OP NOTE
[facility] - Orthopedic Periop Services  Surgery Department  Operative Note    SUMMARY     Date of Procedure: 3/12/2024     Procedure: Procedure(s) (LRB):  ARTHROSCOPY, SHOULDER (Left)  ARTHROSCOPY, SHOULDER, WITH SUBACROMIAL SPACE DECOMPRESSION (Left)  DEBRIDEMENT, SHOULDER, ARTHROSCOPIC     Surgeon(s) and Role:     * Salomon Sosa MD - Primary    Assisting Surgeon: None    Pre-Operative Diagnosis: Impingement syndrome of left shoulder [M75.42]    Post-Operative Diagnosis: Post-Op Diagnosis Codes:     * Impingement syndrome of left shoulder [M75.42]    Anesthesia:  General/regional block    Technical Procedures Used:  Left shoulder arthroscopy                        DEPARTMENT OF ORTHOPEDIC SURGERY                OPERATIVE REPORT     NAME:  Muna Newberry  MRN: [id number]     DATE OF SURGERY:  3/12/2024    PREOPERATIVE DIAGNOSIS: left shoulder internal derangement       POSTOPERATIVE DIAGNOSIS:  AC joint DJD with impingement, partial-thickness supraspinatus rotator cuff tear    ANESTHESIA:  General/ Regional block    PROCEDURE:  Examination under anesthesia, arthroscopy with extensive debridement, bursectomy, coracoacromial ligament resection, Sharan distal clavicle resection (6-8 mm), acromioplasty - left shoulder      PROCEDURE IN DETAIL:  The patient was taken to the operating room and placed in the supine position.  After adequate level of general anesthesia had been achieved (see anesthesia note) patients left shoulder was examined.  left shoulder normal contour.  There is full passive range of motion of the shoulder without instability signs.  left shoulder and upper extremity was scrubbed with Betadine and draped in sterile fashion.  The operation was begun by inflating the joint with sterile saline through a posterior approach using an 18 gauge spinal needle.  Now a linear skin incision was made over the anterior aspect of the right shoulder for introduction of the blunt arthroscopy cannula.   Intraarticular positioning was confirmed with the arthroscopy camera. The anterior portal was established though an anterior incision made lateral to the coracoid process. The joint was entered through a trans-subscapularis muscle approach.  Inspection of glenohumeral joint revealed normal cartilaginous surfaces.  The biceps tendon anchor were intact.  There was mild degenerative tearing of a portion of the labrum that was debrided with a shaver and contoured with the radiofrequency Wand.  Joint was irrigated with antibiotic solution and the arthroscope was withdrawn.  Blunt cannula was redirected from the posterior portal superiorly in the subacromial space.  Anterior and lateral portals were established.  A bursectomy he was performed with the shaver.  Impingement of the supraspinatus tendon was noted at the anterolateral corner the acromion process and beneath the AC joint.  The coracoacromial ligaments taken down with the radiofrequency Wand.  An acromioplasty and Sharan distal clavicle resection (8-10 mm) was performed with a barrel bur.  Good subacromial decompression was confirmed with multiple portal viewing. Now, the subacromial space was irrigated with saline solution and arthroscope withdrawn.  The stab wounds were reapproximated with interrupted 4-0 suture.  The wounds dressed sterilely and arm sling applied.  The patient was taken to the recovery room in satisfactory condition.  ESTIMATED BLOOD LOSS:  5ccs.  The patient received Ancef antibiotic intravenously prior to the procedure.      Salomon Sosa MD     Description of the Findings of the Procedure:  AC joint DJD with impingement.  Partial-thickness supraspinatus rotator cuff tear    Significant Surgical Tasks Conducted by the Assistant(s), if Applicable:     Complications: No    Estimated Blood Loss (EBL): * No values recorded between 3/12/2024 11:11 AM and 3/12/2024 12:03 PM *           Implants: * No implants in log *    Specimens:    Specimen (24h ago, onward)      None                    Condition: Good    Disposition: PACU - hemodynamically stable.    Attestation: I was present and scrubbed for the entire procedure.

## 2024-03-12 NOTE — BRIEF OP NOTE
[facility] - Orthopedic [facility]iop [facility]  Brief Operative Note    Surgery Date: 3/12/2024     Surgeon(s) and Role:     * Salomon Sosa MD - Primary    Assisting Surgeon: None    Pre-op Diagnosis:  Impingement syndrome of left shoulder [M75.42]    Post-op Diagnosis:  Post-Op Diagnosis Codes:     * Impingement syndrome of left shoulder [M75.42]    Procedure(s) (LRB):  ARTHROSCOPY, SHOULDER (Left)  ARTHROSCOPY, SHOULDER, WITH SUBACROMIAL SPACE DECOMPRESSION (Left)  DEBRIDEMENT, SHOULDER, ARTHROSCOPIC    Anesthesia:  General/block    Description of the findings of the procedure(s): See Op Note     Estimated Blood Loss: * No values recorded between 3/12/2024 11:11 AM and 3/12/2024 12:02 PM * 5 cc         Specimens:   Specimen (24h ago, onward)      None              Discharge Note    OUTCOME: Patient tolerated treatment/procedure well without complication and is now ready for discharge.    DISPOSITION: Home or Self Care    FINAL DIAGNOSIS:  Shoulder impingement    FOLLOWUP: In clinic    DISCHARGE INSTRUCTIONS:    Discharge Procedure Orders   Diet general     Keep surgical extremity elevated     Ice to affected area   Order Comments: using barrier between ice and skin (specify duration&frequency)     Change dressing (specify)   Order Comments: Dressing change: one time per day beginning 72 hours post op.     Call MD for:  temperature >100.4     Call MD for:  persistent nausea and vomiting     Call MD for:  severe uncontrolled pain     Call MD for:  difficulty breathing, headache or visual disturbances     Call MD for:  redness, tenderness, or signs of infection (pain, swelling, redness, odor or green/yellow discharge around incision site)     Call MD for:  hives     Call MD for:  persistent dizziness or light-headedness     Call MD for:  extreme fatigue     Remove dressing in 48 hours     Activity as tolerated     Shower on day dressing removed (No bath)     Weight bearing as tolerated

## 2024-03-12 NOTE — H&P
[facility] - Orthopedic Periop Services  Orthopedics  H&P    Patient Name: Muna Newberry  MRN: [id number]  Admission Date: (Not on file)  Primary Care Provider: Sharita Briggs FNP    Patient information was obtained from patient and ER records.     Subjective:     Principal Problem:Shoulder impingement    Chief Complaint: No chief complaint on file.       HPI:    Muna Newberry is a 58 y.o. female seen today for recheck of her left shoulder.  She underwent subacromial steroid injection with short-lived and adequate relief of symptoms.  MRI previously performed showed partial-thickness tearing of the supraspinatus tendon.  We have discussed the option to proceed with left shoulder arthroscopic subacromial decompression.  The procedure was shown in details.  Potential benefits and risks of surgery outlined to include but not limited to bleeding, infection, damage to blood vessels and nerves, need for further surgery, other risks and complications including even death the patient wished to proceed.  We discussed outpatient general/regional block anesthesia.  Impression:  Impingement syndrome-left shoulder   Plan:  Left shoulder arthroscopic subacromial decompression               Past Medical History:   Diagnosis Date    Herpes zoster without complication 12/20/2021    Hyperplastic rectal polyp 12/19/2016    Idiopathic thrombotic thrombocytopenic purpura     Osteoarthrosis     Osteoporosis     Personal history of colonic polyps 12/19/2016       Past Surgical History:   Procedure Laterality Date    APPENDECTOMY      BREAST BIOPSY      CHOLECYSTECTOMY      COLONOSCOPY  12/19/2016    Dr. Ed [facility]ame [facility]    HYSTERECTOMY      OOPHORECTOMY      SPINAL CORD STIMULATOR IMPLANT      TONSILLECTOMY         Review of patient's allergies indicates:   Allergen Reactions    Phenergan [promethazine]      IV phenergan only    Sulfa (sulfonamide antibiotics) Blisters    Opioids - morphine analogues Rash     Penicillins Rash       No current facility-administered medications for this encounter.     Current Outpatient Medications   Medication Sig    albuterol (PROVENTIL) 2.5 mg /3 mL (0.083 %) nebulizer solution Take 3 mLs (2.5 mg total) by nebulization every 6 (six) hours as needed for Wheezing. Rescue    albuterol (VENTOLIN HFA) 90 mcg/actuation inhaler Inhale 2 puffs into the lungs every 6 (six) hours as needed for Wheezing. Rescue    baclofen (LIORESAL) 10 MG tablet Take 10 mg by mouth as needed.    cyanocobalamin 1,000 mcg/mL injection 1,000 mcg every 28 days.    HYDROcodone-acetaminophen (NORCO) 5-325 mg per tablet Take 0.5-1 tablets by mouth nightly as needed for Pain.    ipratropium (ATROVENT) 21 mcg (0.03 %) nasal spray 2 sprays by Each Nostril route 2 (two) times daily.    nitrofurantoin, macrocrystal-monohydrate, (MACROBID) 100 MG capsule Take 1 capsule (100 mg total) by mouth 2 (two) times daily. for 7 days    ondansetron (ZOFRAN) 4 MG tablet Take 1 tablet (4 mg total) by mouth every 6 (six) hours as needed for Nausea.    pantoprazole (PROTONIX) 40 MG tablet Take 40 mg by mouth daily as needed (prn).    predniSONE (DELTASONE) 20 MG tablet Take 1 tablet (20 mg total) by mouth 2 (two) times daily.    promethazine (PHENERGAN) 12.5 MG Tab Take 1 tablet (12.5 mg total) by mouth every 6 (six) hours as needed (nausea assoc. with migraines).    rimegepant (NURTEC) 75 mg odt Take 1 tablet (75 mg total) by mouth once as needed for Migraine. Place ODT tablet on the tongue; alternatively the ODT tablet may be placed under the tongue    SYNTHROID 88 mcg tablet Take 1 tablet (88 mcg total) by mouth before breakfast. Brand name only.    traMADoL (ULTRAM) 50 mg tablet Take 50 mg by mouth nightly. Take 1/2-1 tablet at bedtime daily prn.     Family History       Problem Relation (Age of Onset)    Breast cancer Paternal Aunt    Heart disease Mother    Leukemia Father          Tobacco Use    Smoking status: Never    Smokeless  tobacco: Never   Substance and Sexual Activity    Alcohol use: Never    Drug use: Never    Sexual activity: Not Currently     Review of Systems   Constitutional: Negative.   Objective:     Vital Signs (Most Recent):    Vital Signs (24h Range):  BP: ()/()   Arterial Line BP: ()/()            There is no height or weight on file to calculate BMI.    No intake or output data in the 24 hours ending 03/11/24 2124     General    Vitals reviewed.  Constitutional: She is oriented to person, place, and time. She appears well-developed and well-nourished.   HENT:   Head: Normocephalic and atraumatic.   Eyes: EOM are normal. Pupils are equal, round, and reactive to light.   Neck: Neck supple.   Cardiovascular:  Normal rate, regular rhythm and normal heart sounds.            Pulmonary/Chest: Effort normal and breath sounds normal.   Abdominal: Soft. Bowel sounds are normal.   Neurological: She is alert and oriented to person, place, and time.   Psychiatric: She has a normal mood and affect. Her behavior is normal.         Right Shoulder Exam   Right shoulder exam is normal.    Left Shoulder Exam     Tenderness   The patient is tender to palpation of the acromioclavicular joint, acromion and greater tuberosity.    Range of Motion   Active abduction:  abnormal   Passive abduction:  normal   Extension:  normal   Forward Flexion:  abnormal   Adduction: normal    Muscle Strength   The patient has normal left shoulder strength.    Tests & Signs   Cross arm: positive  Impingement: positive    Other   Sensation: normal       Vascular Exam       Left Pulses      Radial:                    2+      Capillary Refill  Left Hand: normal capillary refill       Significant Labs: All pertinent labs within the past 24 hours have been reviewed.    Significant Imaging: I have reviewed all pertinent imaging results/findings.  Assessment/Plan:     No notes have been filed under this hospital service.  Service: Orthopedic Surgery      Salomon MEDEIROS  MD Ian  Orthopedics  [facility] - [facility]

## 2024-03-12 NOTE — DISCHARGE SUMMARY
[facility] - Orthopedic Periop Services  Discharge Note  Short Stay    Procedure(s) (LRB):  ARTHROSCOPY, SHOULDER (Left)  ARTHROSCOPY, SHOULDER, WITH SUBACROMIAL SPACE DECOMPRESSION (Left)  DEBRIDEMENT, SHOULDER, ARTHROSCOPIC      OUTCOME: Patient tolerated treatment/procedure well without complication and is now ready for discharge.    DISPOSITION: Home or Self Care    FINAL DIAGNOSIS:  Shoulder impingement    FOLLOWUP: In clinic    DISCHARGE INSTRUCTIONS:    Discharge Procedure Orders   Diet general     Keep surgical extremity elevated     Ice to affected area   Order Comments: using barrier between ice and skin (specify duration&frequency)     Change dressing (specify)   Order Comments: Dressing change: one time per day beginning 72 hours post op.     Call MD for:  temperature >100.4     Call MD for:  persistent nausea and vomiting     Call MD for:  severe uncontrolled pain     Call MD for:  difficulty breathing, headache or visual disturbances     Call MD for:  redness, tenderness, or signs of infection (pain, swelling, redness, odor or green/yellow discharge around incision site)     Call MD for:  hives     Call MD for:  persistent dizziness or light-headedness     Call MD for:  extreme fatigue     Remove dressing in 48 hours     Activity as tolerated     Shower on day dressing removed (No bath)     Weight bearing as tolerated        TIME SPENT ON DISCHARGE: 15 minutes

## 2024-03-12 NOTE — INTERVAL H&P NOTE
The patient has been examined and the H&P has been reviewed:    I concur with the findings and no changes have occurred since H&P was written.    Surgery risks, benefits and alternative options discussed and understood by patient/family.          Active Hospital Problems    Diagnosis  POA    *Shoulder impingement [M25.819]  Yes      Resolved Hospital Problems   No resolved problems to display.

## 2024-03-12 NOTE — HPI
Muna Newberry is a 58 y.o. female seen today for recheck of her left shoulder.  She underwent subacromial steroid injection with short-lived and adequate relief of symptoms.  MRI previously performed showed partial-thickness tearing of the supraspinatus tendon.  We have discussed the option to proceed with left shoulder arthroscopic subacromial decompression.  The procedure was shown in details.  Potential benefits and risks of surgery outlined to include but not limited to bleeding, infection, damage to blood vessels and nerves, need for further surgery, other risks and complications including even death the patient wished to proceed.  We discussed outpatient general/regional block anesthesia.  Impression:  Impingement syndrome-left shoulder   Plan:  Left shoulder arthroscopic subacromial decompression

## 2024-03-12 NOTE — ANESTHESIA PREPROCEDURE EVALUATION
03/12/2024  Muna Newberry is a 58 y.o., female.      Pre-op Assessment    I have reviewed the Patient Summary Reports.     I have reviewed the Nursing Notes. I have reviewed the NPO Status.   I have reviewed the Medications.     Review of Systems  Anesthesia Hx:             Denies Family Hx of Anesthesia complications.   Personal Hx of Anesthesia complications, Post-Operative Nausea/Vomiting                    Social:  Non-Smoker, No Alcohol Use       Hematology/Oncology:    Oncology Normal    -- Anemia:                                  EENT/Dental:  EENT/Dental Normal           Cardiovascular:  Cardiovascular Normal                                            Pulmonary:    Asthma mild                   Renal/:  Renal/ Normal                 Hepatic/GI:  Hepatic/GI Normal                 Musculoskeletal:  Arthritis               Neurological:  Neurology Normal                                      Endocrine:   Hypothyroidism          Dermatological:  Skin Normal    Psych:  Psychiatric Normal                    Physical Exam  General: Well nourished, Cooperative, Alert and Oriented    Airway:  Mallampati: II / II  Mouth Opening: Normal  TM Distance: Normal  Neck ROM: Normal ROM    Dental:  Intact    Chest/Lungs:  Clear to auscultation    Heart:  Rate: Normal  Rhythm: Regular Rhythm  Sounds: Normal        Chemistry        Component Value Date/Time     03/07/2024 1412    K 3.8 03/07/2024 1412     03/07/2024 1412    CO2 27 03/07/2024 1412    BUN 11 03/07/2024 1412    CREATININE 0.53 (L) 03/07/2024 1412    GLU 75 03/07/2024 1412        Component Value Date/Time    CALCIUM 8.9 03/07/2024 1412    ALKPHOS 59 10/02/2023 1528    AST 14 (L) 10/02/2023 1528    ALT 23 10/02/2023 1528    BILITOT 0.4 10/02/2023 1528    EGFRNONAA 95 06/16/2022 1432        Lab Results   Component Value Date    WBC 6.19  03/07/2024    HGB 13.5 03/07/2024    HCT 41.9 03/07/2024     03/07/2024     Results for orders placed or performed in visit on 03/07/24   EKG 12-lead    Collection Time: 03/07/24  1:59 PM   Result Value Ref Range    QRS Duration 76 ms    [facility] QTC Calculation 401 ms    Narrative    Test Reason : Z01.810,    Vent. Rate : 073 BPM     Atrial Rate : 073 BPM     P-R Int : 150 ms          QRS Dur : 076 ms      QT Int : 364 ms       P-R-T Axes : 058 076 045 degrees     QTc Int : 401 ms    Normal sinus rhythm  Normal ECG  No previous ECGs available  Confirmed by Mendel Monroy MD ([id number]) on 3/8/2024 1:16:11 PM    Referred By: YARELIS PURVIS           Confirmed By:Mendel Monroy MD         Anesthesia Plan  Type of Anesthesia, risks & benefits discussed:    Anesthesia Type: Gen ETT  Intra-op Monitoring Plan: Standard ASA Monitors  Post Op Pain Control Plan: multimodal analgesia and peripheral nerve block  Induction:  IV  Airway Plan: Direct  Informed Consent: Informed consent signed with the Patient and all parties understand the risks and agree with anesthesia plan.  All questions answered.   ASA Score: 2  Day of Surgery Review of History & Physical: H&P Update referred to the surgeon/provider.I have interviewed and examined the patient. I have reviewed the patient's H&P dated: There are no significant changes.     Ready For Surgery From Anesthesia Perspective.     .

## 2024-03-12 NOTE — SUBJECTIVE & OBJECTIVE
Past Medical History:   Diagnosis Date    Herpes zoster without complication 12/20/2021    Hyperplastic rectal polyp 12/19/2016    Idiopathic thrombotic thrombocytopenic purpura     Osteoarthrosis     Osteoporosis     Personal history of colonic polyps 12/19/2016       Past Surgical History:   Procedure Laterality Date    APPENDECTOMY      BREAST BIOPSY      CHOLECYSTECTOMY      COLONOSCOPY  12/19/2016    Dr. Ed [facility]    HYSTERECTOMY      OOPHORECTOMY      SPINAL CORD STIMULATOR IMPLANT      TONSILLECTOMY         Review of patient's allergies indicates:   Allergen Reactions    Phenergan [promethazine]      IV phenergan only    Sulfa (sulfonamide antibiotics) Blisters    Opioids - morphine analogues Rash    Penicillins Rash       No current facility-administered medications for this encounter.     Current Outpatient Medications   Medication Sig    albuterol (PROVENTIL) 2.5 mg /3 mL (0.083 %) nebulizer solution Take 3 mLs (2.5 mg total) by nebulization every 6 (six) hours as needed for Wheezing. Rescue    albuterol (VENTOLIN HFA) 90 mcg/actuation inhaler Inhale 2 puffs into the lungs every 6 (six) hours as needed for Wheezing. Rescue    baclofen (LIORESAL) 10 MG tablet Take 10 mg by mouth as needed.    cyanocobalamin 1,000 mcg/mL injection 1,000 mcg every 28 days.    HYDROcodone-acetaminophen (NORCO) 5-325 mg per tablet Take 0.5-1 tablets by mouth nightly as needed for Pain.    ipratropium (ATROVENT) 21 mcg (0.03 %) nasal spray 2 sprays by Each Nostril route 2 (two) times daily.    nitrofurantoin, macrocrystal-monohydrate, (MACROBID) 100 MG capsule Take 1 capsule (100 mg total) by mouth 2 (two) times daily. for 7 days    ondansetron (ZOFRAN) 4 MG tablet Take 1 tablet (4 mg total) by mouth every 6 (six) hours as needed for Nausea.    pantoprazole (PROTONIX) 40 MG tablet Take 40 mg by mouth daily as needed (prn).    predniSONE (DELTASONE) 20 MG tablet Take 1 tablet (20 mg total) by mouth 2 (two)  times daily.    promethazine (PHENERGAN) 12.5 MG Tab Take 1 tablet (12.5 mg total) by mouth every 6 (six) hours as needed (nausea assoc. with migraines).    rimegepant (NURTEC) 75 mg odt Take 1 tablet (75 mg total) by mouth once as needed for Migraine. Place ODT tablet on the tongue; alternatively the ODT tablet may be placed under the tongue    SYNTHROID 88 mcg tablet Take 1 tablet (88 mcg total) by mouth before breakfast. Brand name only.    traMADoL (ULTRAM) 50 mg tablet Take 50 mg by mouth nightly. Take 1/2-1 tablet at bedtime daily prn.     Family History       Problem Relation (Age of Onset)    Breast cancer Paternal Aunt    Heart disease Mother    Leukemia Father          Tobacco Use    Smoking status: Never    Smokeless tobacco: Never   Substance and Sexual Activity    Alcohol use: Never    Drug use: Never    Sexual activity: Not Currently     Review of Systems   Constitutional: Negative.   Objective:     Vital Signs (Most Recent):    Vital Signs (24h Range):  BP: ()/()   Arterial Line BP: ()/()            There is no height or weight on file to calculate BMI.    No intake or output data in the 24 hours ending 03/11/24 2120     General    Vitals reviewed.  Constitutional: She is oriented to person, place, and time. She appears well-developed and well-nourished.   HENT:   Head: Normocephalic and atraumatic.   Eyes: EOM are normal. Pupils are equal, round, and reactive to light.   Neck: Neck supple.   Cardiovascular:  Normal rate, regular rhythm and normal heart sounds.            Pulmonary/Chest: Effort normal and breath sounds normal.   Abdominal: Soft. Bowel sounds are normal.   Neurological: She is alert and oriented to person, place, and time.   Psychiatric: She has a normal mood and affect. Her behavior is normal.         Right Shoulder Exam   Right shoulder exam is normal.    Left Shoulder Exam     Tenderness   The patient is tender to palpation of the acromioclavicular joint, acromion and greater  tuberosity.    Range of Motion   Active abduction:  abnormal   Passive abduction:  normal   Extension:  normal   Forward Flexion:  abnormal   Adduction: normal    Muscle Strength   The patient has normal left shoulder strength.    Tests & Signs   Cross arm: positive  Impingement: positive    Other   Sensation: normal       Vascular Exam       Left Pulses      Radial:                    2+      Capillary Refill  Left Hand: normal capillary refill       Significant Labs: All pertinent labs within the past 24 hours have been reviewed.    Significant Imaging: I have reviewed all pertinent imaging results/findings.

## 2024-03-13 PROCEDURE — 64415 NJX AA&/STRD BRCH PLXS IMG: CPT | Mod: 59,LT,, | Performed by: ANESTHESIOLOGY

## 2024-03-13 RX ORDER — ROPIVACAINE HYDROCHLORIDE 7.5 MG/ML
INJECTION, SOLUTION EPIDURAL; PERINEURAL
Status: DISCONTINUED | OUTPATIENT
Start: 2024-03-12 | End: 2024-03-13

## 2024-03-13 NOTE — ANESTHESIA POSTPROCEDURE EVALUATION
Anesthesia Post Evaluation    Patient: Muna Newberry    Procedure(s) Performed: Procedure(s) (LRB):  ARTHROSCOPY, SHOULDER (Left)  ARTHROSCOPY, SHOULDER, WITH SUBACROMIAL SPACE DECOMPRESSION (Left)  DEBRIDEMENT, SHOULDER, ARTHROSCOPIC    Final Anesthesia Type: general      Patient location during evaluation: PACU  Patient participation: Yes- Able to Participate  Level of consciousness: awake and alert  Post-procedure vital signs: reviewed and stable  Pain management: adequate  Airway patency: patent  PAM mitigation strategies: Multimodal analgesia and Use of major conduction anesthesia (spinal/epidural) or peripheral nerve block  PONV status at discharge: No PONV  Anesthetic complications: no      Cardiovascular status: blood pressure returned to baseline  Respiratory status: unassisted  Hydration status: euvolemic  Follow-up not needed.              Vitals Value Taken Time   /54 03/12/24 1505   Temp 36.4 °C (97.5 °F) 03/12/24 1208   Pulse 71 03/12/24 1505   Resp 18 03/12/24 1500   SpO2 99 % 03/12/24 1505   Vitals shown include unvalidated device data.      Event Time   Out of Recovery 12:39:32         Pain/Pérez Score: Pérez Score: 10 (3/12/2024  3:15 PM)

## 2024-03-13 NOTE — ANESTHESIA PROCEDURE NOTES
Peripheral Block    Patient location during procedure: OR   Block not for primary anesthetic.  Reason for block: at surgeon's request and post-op pain management   Post-op Pain Location: left shoulder   Start time: 3/12/2024 10:25 AM  Timeout: 3/12/2024 10:25 AM   End time: 3/12/2024 10:25 AM    Staffing  Authorizing Provider: Cy Marin MD  Performing Provider: Cy Marin MD    Staffing  Performed by: Cy Marin MD  Authorized by: Cy Marin MD    Preanesthetic Checklist  Completed: patient identified, IV checked, site marked, risks and benefits discussed, surgical consent, monitors and equipment checked, pre-op evaluation and timeout performed  Peripheral Block  Patient position: sitting  Prep: ChloraPrep  Patient monitoring: heart rate, continuous pulse ox, continuous capnometry, frequent blood pressure checks and cardiac monitor  Block type: interscalene  Laterality: left  Injection technique: single shot  Needle  Needle type: Stimuplex   Needle gauge: 20 G  Needle length: 2 in  Needle localization: nerve stimulator and ultrasound guidance   -ultrasound image captured on disc.  Assessment  Injection assessment: negative aspiration, negative parasthesia and local visualized surrounding nerve  Paresthesia pain: none  Heart rate change: no  Slow fractionated injection: yes  Pain Tolerance: comfortable throughout block  Medications:    Medications: ROPIvacaine (NAROPIN) injection 0.75% - Perineural   30 mL - 3/12/2024 10:25:00 AM

## 2024-03-15 ENCOUNTER — TELEPHONE (OUTPATIENT)
Dept: ORTHOPEDICS | Facility: CLINIC | Age: 59
End: 2024-03-15
Payer: COMMERCIAL

## 2024-03-15 NOTE — TELEPHONE ENCOUNTER
----- Message from Shawnee Lovell sent at 3/15/2024  9:11 AM CDT -----  Regarding: Surgery  Patient is out of sling had surgery Tuesday , want to know what she can do and not to do. Please call her @ [phone]

## 2024-03-15 NOTE — TELEPHONE ENCOUNTER
3/15/2024 @1121  Spoke with pt and let her know she could move her wrist and elbow, but not to lift and limit ROM of the shoulder. Patient voices understanding.

## 2024-03-19 ENCOUNTER — OFFICE VISIT (OUTPATIENT)
Dept: ORTHOPEDICS | Facility: CLINIC | Age: 59
End: 2024-03-19
Payer: COMMERCIAL

## 2024-03-19 DIAGNOSIS — Z98.890 STATUS POST ARTHROSCOPY OF LEFT SHOULDER: Primary | ICD-10-CM

## 2024-03-19 PROCEDURE — 99212 OFFICE O/P EST SF 10 MIN: CPT | Mod: PBBFAC

## 2024-03-19 PROCEDURE — 99024 POSTOP FOLLOW-UP VISIT: CPT | Mod: S$GLB,,,

## 2024-03-19 NOTE — PATIENT INSTRUCTIONS
Patient is status-post Arthroscopy, Shoulder - Left, Arthroscopy, Shoulder, With Subacromial Space Decompression - Left, and Debridement, Shoulder, Arthroscopic doing well.  All incisional sutures removed today, replaced with Steri-Strips, discussed that these can get wet in the shower in 2-3 days, let them fall off on their own.  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  Continue home range of motion exercises.  Follow up with Dr. Sosa in 3 weeks, sooner if needed.  
bilateral  lower extremity Active ROM was WFL (within functional limits)/no Active ROM deficits were identified/bilateral upper extremity Active ROM was WFL (within functional limits)

## 2024-03-19 NOTE — PROGRESS NOTES
Arthroscopy, Shoulder - Left, Arthroscopy, Shoulder, With Subacromial Space Decompression - Left, and Debridement, Shoulder, Arthroscopic 3/12/2024    Muna Newberry is a 58 y.o. female seen today for post-op visit.  Patient is 1 week status post left shoulder arthroscopy with subacromial space decompression.  Patient states that she is doing well.  No complaints today.      PAST MEDICAL HISTORY:   Past Medical History:   Diagnosis Date    Herpes zoster without complication 12/20/2021    Hyperplastic rectal polyp 12/19/2016    Idiopathic thrombotic thrombocytopenic purpura     Osteoarthrosis     Osteoporosis     Personal history of colonic polyps 12/19/2016    PONV (postoperative nausea and vomiting)         PAST SURGICAL HISTORY:   Past Surgical History:   Procedure Laterality Date    APPENDECTOMY      ARTHROSCOPIC DEBRIDEMENT OF SHOULDER  3/12/2024    Procedure: DEBRIDEMENT, SHOULDER, ARTHROSCOPIC;  Surgeon: Salomon Sosa MD;  Location: [facility];  Service: Orthopedics;;    ARTHROSCOPY OF SHOULDER WITH DECOMPRESSION OF SUBACROMIAL SPACE Left 3/12/2024    Procedure: ARTHROSCOPY, SHOULDER, WITH SUBACROMIAL SPACE DECOMPRESSION;  Surgeon: Salomon Sosa MD;  Location: [facility] OR;  Service: Orthopedics;  Laterality: Left;    BREAST BIOPSY      CHOLECYSTECTOMY      COLONOSCOPY  12/19/2016    Dr. dE [facility]    HYSTERECTOMY      OOPHORECTOMY      SHOULDER ARTHROSCOPY Left 3/12/2024    Procedure: ARTHROSCOPY, SHOULDER;  Surgeon: Salomon Sosa MD;  Location: [facility];  Service: Orthopedics;  Laterality: Left;    SPINAL CORD STIMULATOR IMPLANT      TONSILLECTOMY          MEDICATIONS:   Current Outpatient Medications:     albuterol (PROVENTIL) 2.5 mg /3 mL (0.083 %) nebulizer solution, Take 3 mLs (2.5 mg total) by nebulization every 6 (six) hours as needed for Wheezing. Rescue, Disp: 90 mL, Rfl: 0    albuterol (VENTOLIN HFA) 90  mcg/actuation inhaler, Inhale 2 puffs into the lungs every 6 (six) hours as needed for Wheezing. Rescue, Disp: 18 g, Rfl: 5    baclofen (LIORESAL) 10 MG tablet, Take 10 mg by mouth as needed., Disp: , Rfl:     cyanocobalamin 1,000 mcg/mL injection, 1,000 mcg every 28 days., Disp: , Rfl:     HYDROcodone-acetaminophen (NORCO) 5-325 mg per tablet, Take 0.5-1 tablets by mouth nightly as needed for Pain., Disp: , Rfl:     HYDROcodone-acetaminophen (NORCO) 5-325 mg per tablet, Take 1 tablet by mouth every 6 (six) hours as needed for Pain., Disp: 28 tablet, Rfl: 0    ipratropium (ATROVENT) 21 mcg (0.03 %) nasal spray, 2 sprays by Each Nostril route 2 (two) times daily., Disp: 30 mL, Rfl: 0    ondansetron (ZOFRAN) 4 MG tablet, Take 1 tablet (4 mg total) by mouth every 6 (six) hours as needed for Nausea., Disp: 20 tablet, Rfl: 0    pantoprazole (PROTONIX) 40 MG tablet, Take 40 mg by mouth daily as needed (prn)., Disp: , Rfl:     predniSONE (DELTASONE) 20 MG tablet, Take 1 tablet (20 mg total) by mouth 2 (two) times daily., Disp: 10 tablet, Rfl: 0    promethazine (PHENERGAN) 12.5 MG Tab, Take 1 tablet (12.5 mg total) by mouth every 6 (six) hours as needed (nausea assoc. with migraines)., Disp: 30 tablet, Rfl: 0    rimegepant (NURTEC) 75 mg odt, Take 1 tablet (75 mg total) by mouth once as needed for Migraine. Place ODT tablet on the tongue; alternatively the ODT tablet may be placed under the tongue, Disp: 16 tablet, Rfl: 2    SYNTHROID 88 mcg tablet, Take 1 tablet (88 mcg total) by mouth before breakfast. Brand name only., Disp: 90 tablet, Rfl: 1    traMADoL (ULTRAM) 50 mg tablet, Take 50 mg by mouth nightly. Take 1/2-1 tablet at bedtime daily prn., Disp: , Rfl:        PHYSICAL EXAM:      GENERAL: Well-developed, well-nourished female . The patient is alert, oriented and cooperative.    EXTREMITIES:  Left shoulder skin clean dry and intact, minimal swelling and erythema around incision sites, active range of motion with  forward flexion to 90° and abduction to 90°, good internal and external rotation, neurovascularly intact    WOUND: Incisions are clean,dry,intact without signs of infection and healing well      RADIOGRAPHIC FINDINGS:   None today       Patient Active Problem List    Diagnosis Date Noted    Shoulder impingement 02/19/2024    Traumatic incomplete tear of left rotator cuff 01/24/2024    Left shoulder pain 01/08/2024    Chronic pain of multiple joints 10/02/2023    Primary osteoarthritis involving multiple joints     Cobalamin deficiency     Occipital neuralgia of left side 06/01/2023    Restless legs syndrome 12/27/2022    Idiopathic chronic pancreatitis 12/27/2022    Iron deficiency anemia 12/27/2022    Chronic migraine with aura 12/27/2022    Primary osteoarthritis of left hand 12/06/2021    Acquired hypothyroidism 04/21/2021    Osteoporosis 04/21/2021    Vitamin D deficiency 04/21/2021    Mild intermittent asthma without complication 04/21/2021    Chronic sinusitis 01/25/2021    Chronic pain 06/13/2020     IMPRESSION AND PLAN: Patient is status-post Arthroscopy, Shoulder - Left, Arthroscopy, Shoulder, With Subacromial Space Decompression - Left, and Debridement, Shoulder, Arthroscopic doing well.  All incisional sutures removed today, replaced with Steri-Strips, discussed that these can get wet in the shower in 2-3 days, let them fall off on their own.  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  Continue home range of motion exercises.  Follow up with Dr. Sosa in 3 weeks, sooner if needed.      No follow-ups on file.       Suha Hendrix PA-C      (Subject to voice recognition error, transcription service not allowed)

## 2024-03-19 NOTE — LETTER
March 19, 2024      [facility]  [address] [place] [address] [place] [address]  Phone: [phone]  Fax: [phone]       Patient: Muna Newberry   YOB: 1965  Date of Visit: 03/19/2024    To Whom It May Concern:    Ant Newberry  was at [facility] on 03/19/2024.  Please excuse her for 03/18/2024.  She may return to work/school on 03/19/2024 with no restrictions. If you have any questions or concerns, or if I can be of further assistance, please do not hesitate to contact me.    Sincerely,    JONNATHAN Bragg

## 2024-03-19 NOTE — LETTER
March 19, 2024      [facility]  [address] [facility] [address]  Phone: [phone]  Fax: [phone]       Patient: Muna Newberry   YOB: 1965  Date of Visit: 03/19/2024    To Whom It May Concern:    Ant Newberry  was at [facility] on 03/19/2024. She may return to work/school on 03/20/2024 with no restrictions. If you have any questions or concerns, or if I can be of further assistance, please do not hesitate to contact me.    Sincerely,    JONNATHAN Bragg

## 2024-03-25 ENCOUNTER — OFFICE VISIT (OUTPATIENT)
Dept: FAMILY MEDICINE | Facility: CLINIC | Age: 59
End: 2024-03-25
Payer: COMMERCIAL

## 2024-03-25 VITALS
RESPIRATION RATE: 20 BRPM | HEIGHT: 61 IN | SYSTOLIC BLOOD PRESSURE: 107 MMHG | HEART RATE: 87 BPM | TEMPERATURE: 99 F | DIASTOLIC BLOOD PRESSURE: 71 MMHG | OXYGEN SATURATION: 97 % | WEIGHT: 141.81 LBS | BODY MASS INDEX: 26.77 KG/M2

## 2024-03-25 DIAGNOSIS — J45.20 MILD INTERMITTENT ASTHMA WITHOUT COMPLICATION: Chronic | ICD-10-CM

## 2024-03-25 DIAGNOSIS — U07.1 COVID-19: Primary | ICD-10-CM

## 2024-03-25 DIAGNOSIS — R50.9 FEVER, UNSPECIFIED FEVER CAUSE: ICD-10-CM

## 2024-03-25 DIAGNOSIS — Z78.0 POSTMENOPAUSAL: ICD-10-CM

## 2024-03-25 DIAGNOSIS — Z12.31 BREAST CANCER SCREENING BY MAMMOGRAM: ICD-10-CM

## 2024-03-25 DIAGNOSIS — M81.0 OSTEOPOROSIS WITHOUT CURRENT PATHOLOGICAL FRACTURE, UNSPECIFIED OSTEOPOROSIS TYPE: Chronic | ICD-10-CM

## 2024-03-25 LAB
CTP QC/QA: YES
MOLECULAR STREP A: NEGATIVE
POC MOLECULAR INFLUENZA A AGN: NEGATIVE
POC MOLECULAR INFLUENZA B AGN: NEGATIVE
SARS-COV-2 RDRP RESP QL NAA+PROBE: POSITIVE

## 2024-03-25 PROCEDURE — 87635 SARS-COV-2 COVID-19 AMP PRB: CPT | Mod: QW,,, | Performed by: NURSE PRACTITIONER

## 2024-03-25 PROCEDURE — 3008F BODY MASS INDEX DOCD: CPT | Mod: ,,, | Performed by: NURSE PRACTITIONER

## 2024-03-25 PROCEDURE — 3078F DIAST BP <80 MM HG: CPT | Mod: ,,, | Performed by: NURSE PRACTITIONER

## 2024-03-25 PROCEDURE — 1160F RVW MEDS BY RX/DR IN RCRD: CPT | Mod: ,,, | Performed by: NURSE PRACTITIONER

## 2024-03-25 PROCEDURE — 3074F SYST BP LT 130 MM HG: CPT | Mod: ,,, | Performed by: NURSE PRACTITIONER

## 2024-03-25 PROCEDURE — 1159F MED LIST DOCD IN RCRD: CPT | Mod: ,,, | Performed by: NURSE PRACTITIONER

## 2024-03-25 PROCEDURE — 87651 STREP A DNA AMP PROBE: CPT | Mod: QW,,, | Performed by: NURSE PRACTITIONER

## 2024-03-25 PROCEDURE — 99214 OFFICE O/P EST MOD 30 MIN: CPT | Mod: ,,, | Performed by: NURSE PRACTITIONER

## 2024-03-25 PROCEDURE — 87502 INFLUENZA DNA AMP PROBE: CPT | Mod: QW,,, | Performed by: NURSE PRACTITIONER

## 2024-03-25 RX ORDER — ALBUTEROL SULFATE 90 UG/1
2 AEROSOL, METERED RESPIRATORY (INHALATION) EVERY 6 HOURS PRN
Qty: 18 G | Refills: 5 | Status: SHIPPED | OUTPATIENT
Start: 2024-03-25

## 2024-03-25 RX ORDER — NIRMATRELVIR AND RITONAVIR 300-100 MG
KIT ORAL
Qty: 30 TABLET | Refills: 0 | Status: SHIPPED | OUTPATIENT
Start: 2024-03-25 | End: 2024-04-23

## 2024-03-25 RX ORDER — ONDANSETRON 4 MG/1
4 TABLET, FILM COATED ORAL EVERY 6 HOURS PRN
Qty: 20 TABLET | Refills: 0 | Status: SHIPPED | OUTPATIENT
Start: 2024-03-25

## 2024-03-25 NOTE — LETTER
March 25, 2024    Muna Newberry  [address] [place] [address]             [facility]  [address] [place] [address]  Phone: [phone]  Fax: [phone]   March 25, 2024     Patient: Muna Newberry   YOB: 1965   Date of Visit: 3/25/2024       To Whom it May Concern:    Muna Newberry was seen in my clinic on 3/25/2024. She may return to work on 03/28/2024 .  Patient can return to work without the use of fever reducer on 3/28/2024.    Please excuse her from any classes or work missed.    If you have any questions or concerns, please don't hesitate to call.    Sincerely,         Sharita Briggs, GRETCHENP

## 2024-03-25 NOTE — PROGRESS NOTES
[facility]       PATIENT NAME: Muna Newberry   : 1965    AGE: 58 y.o. DATE OF ENCOUNTER: 3/25/24   MRN: [id number]      Visit type: WALK-IN  PCP:  Sharita Briggs FNP    Reason for Visit / Chief Complaint: Generalized Body Aches, Fatigue, Fever, Headache, Nasal Congestion, Sore Throat (Pt presents to the clinic for uri symptoms (been sick since Saturday)), Ear Fullness, Nausea, and Health Maintenance (Patient will like to schedule mmg and dexa scan)     Subjective:     Presents c/o 2 nights ago developed body aches and fever.  Alternating tylenol and aleve.    Had shoulder surgery 3/12/24 and hasn't been sleeping well.    Review of Systems:     Review of Systems   Constitutional:  Positive for fatigue and fever.   HENT:  Positive for congestion, ear pain, postnasal drip, sinus pressure and sore throat.    Respiratory:  Positive for cough. Negative for shortness of breath and wheezing.    Cardiovascular: Negative.    Gastrointestinal:  Positive for diarrhea and nausea. Negative for abdominal pain and vomiting.   Musculoskeletal:  Positive for myalgias.   Neurological:  Positive for headaches.       Allergies and Meds:     Review of patient's allergies indicates:   Allergen Reactions    Phenergan [promethazine]      IV phenergan only    Sulfa (sulfonamide antibiotics) Blisters    Opioids - morphine analogues Rash    Penicillins Rash        Current Outpatient Medications:     albuterol (PROVENTIL) 2.5 mg /3 mL (0.083 %) nebulizer solution, Take 3 mLs (2.5 mg total) by nebulization every 6 (six) hours as needed for Wheezing. Rescue, Disp: 90 mL, Rfl: 0    baclofen (LIORESAL) 10 MG tablet, Take 10 mg by mouth as needed., Disp: , Rfl:     cyanocobalamin 1,000 mcg/mL injection, 1,000 mcg every 28 days., Disp: , Rfl:     HYDROcodone-acetaminophen (NORCO) 5-325 mg per tablet, Take 0.5-1 tablets by mouth nightly as needed for Pain., Disp: , Rfl:     pantoprazole (PROTONIX) 40 MG  tablet, Take 40 mg by mouth daily as needed (prn)., Disp: , Rfl:     rimegepant (NURTEC) 75 mg odt, Take 1 tablet (75 mg total) by mouth once as needed for Migraine. Place ODT tablet on the tongue; alternatively the ODT tablet may be placed under the tongue, Disp: 16 tablet, Rfl: 2    SYNTHROID 88 mcg tablet, Take 1 tablet (88 mcg total) by mouth before breakfast. Brand name only., Disp: 90 tablet, Rfl: 1    traMADoL (ULTRAM) 50 mg tablet, Take 50 mg by mouth nightly. Take 1/2-1 tablet at bedtime daily prn., Disp: , Rfl:     albuterol (VENTOLIN HFA) 90 mcg/actuation inhaler, Inhale 2 puffs into the lungs every 6 (six) hours as needed for Wheezing. Rescue, Disp: 18 g, Rfl: 5    nirmatrelvir-ritonavir (PAXLOVID) 300 mg (150 mg x 2)-100 mg copackaged tablets (EUA), Take 3 tablets by mouth 2 (two) times daily. Each dose contains 2 nirmatrelvir (pink tablets) and 1 ritonavir (white tablet). Take all 3 tablets together 2 times daily x 5 days., Disp: 30 tablet, Rfl: 0    ondansetron (ZOFRAN) 4 MG tablet, Take 1 tablet (4 mg total) by mouth every 6 (six) hours as needed for Nausea., Disp: 20 tablet, Rfl: 0    Medical History:     Past Medical History:   Diagnosis Date    Herpes zoster without complication 12/20/2021    Hyperplastic rectal polyp 12/19/2016    Idiopathic thrombotic thrombocytopenic purpura     Osteoarthrosis     Osteoporosis     Personal history of colonic polyps 12/19/2016    PONV (postoperative nausea and vomiting)       Social History     Tobacco Use   Smoking Status Never   Smokeless Tobacco Never      Past Surgical History:   Procedure Laterality Date    APPENDECTOMY      ARTHROSCOPIC DEBRIDEMENT OF SHOULDER  3/12/2024    Procedure: DEBRIDEMENT, SHOULDER, ARTHROSCOPIC;  Surgeon: Salomon Sosa MD;  Location: [facility];  Service: Orthopedics;;    ARTHROSCOPY OF SHOULDER WITH DECOMPRESSION OF SUBACROMIAL SPACE Left 3/12/2024    Procedure: ARTHROSCOPY, SHOULDER, WITH SUBACROMIAL SPACE  "DECOMPRESSION;  Surgeon: Salomon Sosa MD;  Location: [facility];  Service: Orthopedics;  Laterality: Left;    BREAST BIOPSY      CHOLECYSTECTOMY      COLONOSCOPY  12/19/2016    Dr. Ed [facility]    HYSTERECTOMY      OOPHORECTOMY      SHOULDER ARTHROSCOPY Left 3/12/2024    Procedure: ARTHROSCOPY, SHOULDER;  Surgeon: Salomon Sosa MD;  Location: [facility] OR;  Service: Orthopedics;  Laterality: Left;    SPINAL CORD STIMULATOR IMPLANT      TONSILLECTOMY        Immunization History   Administered Date(s) Administered    Influenza 10/17/2003        Objective:     Wt Readings from Last 3 Encounters:   03/25/24 1256 64.3 kg (141 lb 12.8 oz)   03/12/24 0742 63.5 kg (140 lb)   03/04/24 1300 64.4 kg (142 lb)       /71 (BP Location: Right arm, Patient Position: Sitting, BP Method: Medium (Automatic))   Pulse 87   Temp 98.7 °F (37.1 °C) (Oral)   Resp 20   Ht 5' 1" (1.549 m)   Wt 64.3 kg (141 lb 12.8 oz)   SpO2 97%   BMI 26.79 kg/m²   Body mass index is 26.79 kg/m².     Physical Exam:    Physical Exam  Vitals and nursing note reviewed.   Constitutional:       General: She is not in acute distress.     Appearance: Normal appearance. She is not toxic-appearing or diaphoretic.   HENT:      Head: Normocephalic.      Right Ear: Hearing, tympanic membrane, ear canal and external ear normal.      Left Ear: Hearing, tympanic membrane, ear canal and external ear normal.      Nose: Mucosal edema and congestion present. No rhinorrhea.      Right Turbinates: Swollen.      Left Turbinates: Swollen.      Right Sinus: No maxillary sinus tenderness or frontal sinus tenderness.      Left Sinus: No maxillary sinus tenderness or frontal sinus tenderness.      Mouth/Throat:      Lips: Pink.      Mouth: Mucous membranes are moist.      Tongue: No lesions.      Pharynx: Uvula midline. No pharyngeal swelling, oropharyngeal exudate, posterior oropharyngeal erythema or uvula swelling.   Eyes: "      Conjunctiva/sclera: Conjunctivae normal.      Pupils: Pupils are equal, round, and reactive to light.   Cardiovascular:      Rate and Rhythm: Normal rate and regular rhythm.      Heart sounds: Normal heart sounds.   Pulmonary:      Effort: Pulmonary effort is normal. No respiratory distress.      Breath sounds: Normal breath sounds. No wheezing, rhonchi or rales.   Musculoskeletal:      Cervical back: No rigidity.   Lymphadenopathy:      Cervical: No cervical adenopathy.   Skin:     General: Skin is warm and dry.      Coloration: Skin is not jaundiced or pale.      Findings: No rash.   Neurological:      General: No focal deficit present.      Mental Status: She is alert and oriented to person, place, and time.   Psychiatric:         Mood and Affect: Mood normal.         Behavior: Behavior normal.         Thought Content: Thought content normal.         Judgment: Judgment normal.         Assessment and Plan:        1. COVID-19  -     nirmatrelvir-ritonavir (PAXLOVID) 300 mg (150 mg x 2)-100 mg copackaged tablets (EUA); Take 3 tablets by mouth 2 (two) times daily. Each dose contains 2 nirmatrelvir (pink tablets) and 1 ritonavir (white tablet). Take all 3 tablets together 2 times daily x 5 days.  Dispense: 30 tablet; Refill: 0    2. Fever, unspecified fever cause  -     POCT COVID-19 Rapid Screening  -     POCT Influenza A/B Molecular  -     POCT Strep A, Molecular    3. Postmenopausal  -     DXA Bone Density Axial Skeleton 1 or more sites; Future; Expected date: 04/01/2024    4. Osteoporosis without current pathological fracture, unspecified osteoporosis type  -     DXA Bone Density Axial Skeleton 1 or more sites; Future; Expected date: 04/01/2024    5. Breast cancer screening by mammogram  -     Mammo Digital Screening Bilat w/ Narinder; Future; Expected date: 03/25/2024    6. Mild intermittent asthma without complication  -     albuterol (VENTOLIN HFA) 90 mcg/actuation inhaler; Inhale 2 puffs into the lungs every  6 (six) hours as needed for Wheezing. Rescue  Dispense: 18 g; Refill: 5    Other orders  -     ondansetron (ZOFRAN) 4 MG tablet; Take 1 tablet (4 mg total) by mouth every 6 (six) hours as needed for Nausea.  Dispense: 20 tablet; Refill: 0       Office Visit on 03/25/2024   Component Date Value Ref Range Status    POC Rapid COVID 03/25/2024 Positive (A)  Negative Final     Acceptable 03/25/2024 Yes   Final    POC Molecular Influenza A Ag 03/25/2024 Negative  Negative, Not Reported Final    POC Molecular Influenza B Ag 03/25/2024 Negative  Negative, Not Reported Final     Acceptable 03/25/2024 Yes   Final    Molecular Strep A, POC 03/25/2024 Negative  Negative Final     Acceptable 03/25/2024 Yes   Final      Advised to quarantine until fever free x24 hours without the use of fever reducer.  Advised COVID-19 is a virus and viruses do not respond to antibiotics nor do antibiotics prevent a bacterial infection from developing.   Advised steroids are not recommended for outpatient management of covid symptoms.   Advised of anti-viral within first 5 days of illness if not contraindicated by medications, liver or renal function, or other medical condition.    Rx for Paxlovid to pharmacy.   Treatment for COVID-19 is primarily supportive including rest, increase fluids, and the use of OTC meds to help alleviate symptoms.    If not allergic, can take acetaminophen or ibuprofen as needed for body aches, headache, or fever.  Avoid NSAIDs if history of renal disease.  Can use home O2 sat monitoring if experiencing shortness of breath.   Report to ED for evaluation of any new or worsening chest pain, shortness of breath, or other severe symptoms.  Call the office if you have other questions or concerns that arise.    F/u as needed or if symptoms worsen or persist.  Future Appointments   Date Time Provider Department Center   4/9/2024  3:40 PM Sharita Briggs FNP [facility]  [facility]   4/15/2024  3:30 PM Salomon Sosa MD [facility]   10/3/2024  3:40 PM Sharita Briggs [facility]       Signature: Sharita Briggs [facility]  Reviewed on 05/01/2024 by Omar Mclean MD

## 2024-03-26 ENCOUNTER — TELEPHONE (OUTPATIENT)
Dept: FAMILY MEDICINE | Facility: CLINIC | Age: 59
End: 2024-03-26
Payer: COMMERCIAL

## 2024-03-26 NOTE — TELEPHONE ENCOUNTER
----- Message from TOMER Dennis sent at 3/25/2024 12:51 PM CDT -----  Regarding: Referral  Needs to be scheduled for mammo and DEXA.  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

## 2024-04-15 ENCOUNTER — OFFICE VISIT (OUTPATIENT)
Dept: ORTHOPEDICS | Facility: CLINIC | Age: 59
End: 2024-04-15
Payer: COMMERCIAL

## 2024-04-15 ENCOUNTER — HOSPITAL ENCOUNTER (OUTPATIENT)
Dept: RADIOLOGY | Facility: HOSPITAL | Age: 59
Discharge: HOME | End: 2024-04-15
Attending: ORTHOPAEDIC SURGERY
Payer: COMMERCIAL

## 2024-04-15 DIAGNOSIS — M25.562 ACUTE PAIN OF LEFT KNEE: ICD-10-CM

## 2024-04-15 DIAGNOSIS — S46.012D TRAUMATIC INCOMPLETE TEAR OF LEFT ROTATOR CUFF, SUBSEQUENT ENCOUNTER: ICD-10-CM

## 2024-04-15 DIAGNOSIS — M25.562 ACUTE PAIN OF LEFT KNEE: Primary | ICD-10-CM

## 2024-04-15 PROCEDURE — 99024 POSTOP FOLLOW-UP VISIT: CPT | Mod: S$GLB,,, | Performed by: ORTHOPAEDIC SURGERY

## 2024-04-15 PROCEDURE — 99212 OFFICE O/P EST SF 10 MIN: CPT | Mod: PBBFAC,25 | Performed by: ORTHOPAEDIC SURGERY

## 2024-04-15 PROCEDURE — 73562 X-RAY EXAM OF KNEE 3: CPT | Mod: TC,LT

## 2024-04-15 PROCEDURE — 73562 X-RAY EXAM OF KNEE 3: CPT | Mod: 26,LT,, | Performed by: ORTHOPAEDIC SURGERY

## 2024-04-15 NOTE — PROGRESS NOTES
CLINIC NOTE       Chief Complaint   Patient presents with    Left Shoulder - Pain        Muna Newberry is a 58 y.o. female seen today for left shoulder.  She underwent arthroscopic subacromial decompression on 03/12/2024.  She was not require rotator cuff repair.  Her postoperative course thus far has been uncomplicated 1 of gradual improvement.  She reports near-complete resolution of symptoms.  She however today brought to my attention some lingering left knee pain.  She had pictures on her phone showing bruising involving the medial aspect of the upper tibial region.  She has had some lingering pain in the popliteal space region.  She was however continued to ambulate independently and has no perceptible limp.  She was contemplating an organized walk for exercise but wanted to evaluate her knee prior to participating.      Past Medical History:   Diagnosis Date    Herpes zoster without complication 12/20/2021    Hyperplastic rectal polyp 12/19/2016    Idiopathic thrombotic thrombocytopenic purpura     Osteoarthrosis     Osteoporosis     Personal history of colonic polyps 12/19/2016    PONV (postoperative nausea and vomiting)      Family History   Problem Relation Name Age of Onset    Heart disease Mother      Leukemia Father      Breast cancer Paternal Aunt       Current Outpatient Medications on File Prior to Visit   Medication Sig Dispense Refill    albuterol (PROVENTIL) 2.5 mg /3 mL (0.083 %) nebulizer solution Take 3 mLs (2.5 mg total) by nebulization every 6 (six) hours as needed for Wheezing. Rescue 90 mL 0    albuterol (VENTOLIN HFA) 90 mcg/actuation inhaler Inhale 2 puffs into the lungs every 6 (six) hours as needed for Wheezing. Rescue 18 g 5    baclofen (LIORESAL) 10 MG tablet Take 10 mg by mouth as needed.      cyanocobalamin 1,000 mcg/mL injection 1,000 mcg every 28 days.      HYDROcodone-acetaminophen (NORCO) 5-325 mg per tablet Take 0.5-1 tablets by mouth nightly as needed for Pain.       nirmatrelvir-ritonavir (PAXLOVID) 300 mg (150 mg x 2)-100 mg copackaged tablets (EUA) Take 3 tablets by mouth 2 (two) times daily. Each dose contains 2 nirmatrelvir (pink tablets) and 1 ritonavir (white tablet). Take all 3 tablets together 2 times daily x 5 days. 30 tablet 0    ondansetron (ZOFRAN) 4 MG tablet Take 1 tablet (4 mg total) by mouth every 6 (six) hours as needed for Nausea. 20 tablet 0    pantoprazole (PROTONIX) 40 MG tablet Take 40 mg by mouth daily as needed (prn).      rimegepant (NURTEC) 75 mg odt Take 1 tablet (75 mg total) by mouth once as needed for Migraine. Place ODT tablet on the tongue; alternatively the ODT tablet may be placed under the tongue 16 tablet 2    SYNTHROID 88 mcg tablet Take 1 tablet (88 mcg total) by mouth before breakfast. Brand name only. 90 tablet 1    traMADoL (ULTRAM) 50 mg tablet Take 50 mg by mouth nightly. Take 1/2-1 tablet at bedtime daily prn.       No current facility-administered medications on file prior to visit.       ROS     There were no vitals filed for this visit.    Past Surgical History:   Procedure Laterality Date    APPENDECTOMY      ARTHROSCOPIC DEBRIDEMENT OF SHOULDER  3/12/2024    Procedure: DEBRIDEMENT, SHOULDER, ARTHROSCOPIC;  Surgeon: Salomon Sosa MD;  Location: [facility];  Service: Orthopedics;;    ARTHROSCOPY OF SHOULDER WITH DECOMPRESSION OF SUBACROMIAL SPACE Left 3/12/2024    Procedure: ARTHROSCOPY, SHOULDER, WITH SUBACROMIAL SPACE DECOMPRESSION;  Surgeon: Salomon Sosa MD;  Location: [facility] OR;  Service: Orthopedics;  Laterality: Left;    BREAST BIOPSY      CHOLECYSTECTOMY      COLONOSCOPY  12/19/2016    Dr. Ed [facility]    HYSTERECTOMY      OOPHORECTOMY      SHOULDER ARTHROSCOPY Left 3/12/2024    Procedure: ARTHROSCOPY, SHOULDER;  Surgeon: Salomon Sosa MD;  Location: [facility];  Service: Orthopedics;  Laterality: Left;    SPINAL CORD STIMULATOR IMPLANT      TONSILLECTOMY           Review of patient's allergies indicates:   Allergen Reactions    Phenergan [promethazine]      IV phenergan only    Sulfa (sulfonamide antibiotics) Blisters    Opioids - morphine analogues Rash    Penicillins Rash        Ortho Exam :  Left shoulder well-healed arthroscopy portal incisions.  She demonstrates essentially full active motion without crepitance or instability signs.  Exam of the left knee he was also unremarkable.    Radiographic Examination:  Left knee 04/15/2024    Technique:  Three views AP lateral patellar    Findings:  Bones well mineralized.  Joint spaces are generally well maintained with no evidence of fracture, dislocation or pathologic bone.  No evidence of significant DJD radiographically.    Impression:   See Above    Assessment and Plan  Patient Active Problem List    Diagnosis Date Noted    Shoulder impingement 02/19/2024    Traumatic incomplete tear of left rotator cuff 01/24/2024    Left shoulder pain 01/08/2024    Chronic pain of multiple joints 10/02/2023    Primary osteoarthritis involving multiple joints     Cobalamin deficiency     Occipital neuralgia of left side 06/01/2023    Restless legs syndrome 12/27/2022    Idiopathic chronic pancreatitis 12/27/2022    Iron deficiency anemia 12/27/2022    Chronic migraine with aura 12/27/2022    Primary osteoarthritis of left hand 12/06/2021    Acquired hypothyroidism 04/21/2021    Osteoporosis 04/21/2021    Vitamin D deficiency 04/21/2021    Mild intermittent asthma without complication 04/21/2021    Chronic sinusitis 01/25/2021    Chronic pain 06/13/2020    Impression:  Status post left shoulder arthroscopy doing well.    Plan:  We will participate in 2 weeks physical therapy and recheck with results for the left shoulder.      Salomon Sosa M.D.

## 2024-04-23 ENCOUNTER — OFFICE VISIT (OUTPATIENT)
Dept: GASTROENTEROLOGY | Facility: CLINIC | Age: 59
End: 2024-04-23
Payer: COMMERCIAL

## 2024-04-23 VITALS
HEIGHT: 61 IN | SYSTOLIC BLOOD PRESSURE: 117 MMHG | HEART RATE: 73 BPM | DIASTOLIC BLOOD PRESSURE: 44 MMHG | BODY MASS INDEX: 26.85 KG/M2 | WEIGHT: 142.19 LBS | OXYGEN SATURATION: 97 %

## 2024-04-23 DIAGNOSIS — K57.92 DIVERTICULITIS: ICD-10-CM

## 2024-04-23 DIAGNOSIS — R10.32 LEFT LOWER QUADRANT PAIN: Primary | ICD-10-CM

## 2024-04-23 PROCEDURE — 1159F MED LIST DOCD IN RCRD: CPT | Mod: S$GLB,,,

## 2024-04-23 PROCEDURE — 3008F BODY MASS INDEX DOCD: CPT | Mod: S$GLB,,,

## 2024-04-23 PROCEDURE — 1160F RVW MEDS BY RX/DR IN RCRD: CPT | Mod: S$GLB,,,

## 2024-04-23 PROCEDURE — 3074F SYST BP LT 130 MM HG: CPT | Mod: S$GLB,,,

## 2024-04-23 PROCEDURE — 3078F DIAST BP <80 MM HG: CPT | Mod: S$GLB,,,

## 2024-04-23 PROCEDURE — 99214 OFFICE O/P EST MOD 30 MIN: CPT | Mod: PBBFAC

## 2024-04-23 PROCEDURE — 99214 OFFICE O/P EST MOD 30 MIN: CPT | Mod: S$GLB,,,

## 2024-04-23 RX ORDER — CIPROFLOXACIN 500 MG/1
500 TABLET ORAL EVERY 12 HOURS
Qty: 20 TABLET | Refills: 0 | Status: SHIPPED | OUTPATIENT
Start: 2024-04-23

## 2024-04-23 RX ORDER — POLYETHYLENE GLYCOL 3350, SODIUM SULFATE ANHYDROUS, SODIUM BICARBONATE, SODIUM CHLORIDE, POTASSIUM CHLORIDE 236; 22.74; 6.74; 5.86; 2.97 G/4L; G/4L; G/4L; G/4L; G/4L
4 POWDER, FOR SOLUTION ORAL ONCE
Qty: 4000 ML | Refills: 0 | Status: SHIPPED | OUTPATIENT
Start: 2024-04-23 | End: 2024-04-23

## 2024-04-23 RX ORDER — METRONIDAZOLE 500 MG/1
500 TABLET ORAL EVERY 8 HOURS
Qty: 30 TABLET | Refills: 0 | Status: SHIPPED | OUTPATIENT
Start: 2024-04-23

## 2024-04-23 NOTE — LETTER
April 23, 2024      [facility]  [address] [place] [address]  Phone: [phone]       Patient: Muna Newberry   YOB: 1965  Date of Visit: 04/23/2024    To Whom It May Concern:    Ant Newberry  was at [facility] on 04/23/2024. The patient may return to work/school on 4/24/2024 with no restrictions. If you have any questions or concerns, or if I can be of further assistance, please do not hesitate to contact me.    Sincerely,    Joy Aleman MA

## 2024-04-23 NOTE — PATIENT INSTRUCTIONS
If you begin to have severe abd pain and unable to stand up staraight, or begin to run a fever 101 or higher, nausea or vomiting go straight to ED

## 2024-04-23 NOTE — PROGRESS NOTES
CC: LLQ abd pain    HPI 58 y.o. white female presents today with left lower quadrant pain.  She states yesterday when she got home from work she was hurting so badly that she took some nausea medicine and went to bed by 6:00 p.m. She said the pain was so severe when she walked she had to hold her side.  She does state that her stools are pencil like but soft.  She did have a low-grade temp of 99.9° last night.  She does feel some better today however she is experiencing some lower left quadrant pain still and is guarding left side.  Patient did bring previous 7/19/22 CT from [facility] from few years ago it did show that she had some diverticula present in the sigmoid colon.  [place] [facility] [place] diverticulosis it only showed 1 rectal polyp at that time.  She was told to have another scope in 10 years.  The polyp from the rectum was hyperplastic.  We will schedule for colonoscopy here.  Patient does have a history of chronic pancreatitis, she has never drank alcohol, she has had 2 surgeries for her pancreas.  She is very particular about what she eats, she exercises daily and drinks lots of water daily.  Labs cbc shows no anemia, or elevated liver enzymes        Medical records reviewed. Additional history supplemented by nursing.     Past Medical History:   Diagnosis Date    Herpes zoster without complication 12/20/2021    Hyperplastic rectal polyp 12/19/2016    Idiopathic thrombotic thrombocytopenic purpura     Osteoarthrosis     Osteoporosis     Personal history of colonic polyps 12/19/2016    PONV (postoperative nausea and vomiting)          Past Surgical History:   Procedure Laterality Date    APPENDECTOMY      ARTHROSCOPIC DEBRIDEMENT OF SHOULDER  3/12/2024    Procedure: DEBRIDEMENT, SHOULDER, ARTHROSCOPIC;  Surgeon: Salomon Sosa MD;  Location: [facility];  Service: Orthopedics;;    ARTHROSCOPY OF SHOULDER WITH DECOMPRESSION OF SUBACROMIAL  "SPACE Left 3/12/2024    Procedure: ARTHROSCOPY, SHOULDER, WITH SUBACROMIAL SPACE DECOMPRESSION;  Surgeon: Salomon Sosa MD;  Location: [facility] OR;  Service: Orthopedics;  Laterality: Left;    BREAST BIOPSY      CHOLECYSTECTOMY      COLONOSCOPY  12/19/2016    Dr. [facility]    HYSTERECTOMY      OOPHORECTOMY      SHOULDER ARTHROSCOPY Left 3/12/2024    Procedure: ARTHROSCOPY, SHOULDER;  Surgeon: Salomon Sosa MD;  Location: [facility] OR;  Service: Orthopedics;  Laterality: Left;    SPINAL CORD STIMULATOR IMPLANT      TONSILLECTOMY         Social History  Social History     Tobacco Use    Smoking status: Never    Smokeless tobacco: Never   Substance Use Topics    Alcohol use: Never    Drug use: Yes     Types: Hydrocodone         Family History   Problem Relation Name Age of Onset    Heart disease Mother      Leukemia Father      Breast cancer Paternal Aunt         Review of Systems  General ROS: negative for chills, fever or weight loss  Psychological ROS: negative for hallucination, depression or suicidal ideation  Ophthalmic ROS: negative for blurry vision, photophobia or eye pain  ENT ROS: negative for epistaxis, sore throat or rhinorrhea  Respiratory ROS: no cough, shortness of breath, or wheezing  Cardiovascular ROS: no chest pain or dyspnea on exertion  Gastrointestinal ROS: no abdominal pain, change in bowel habits, or black/ bloody stools    Physical Examination  BP (!) 117/44   Pulse 73   Ht 5' 1" (1.549 m)   Wt 64.5 kg (142 lb 3.2 oz)   SpO2 97%   BMI 26.87 kg/m²   General appearance: alert, cooperative, no distress  HENT: Normocephalic, atraumatic, neck symmetrical, no nasal discharge   Eyes: conjunctivae/corneas clear, PERRL, EOM's intact  Abdomen: soft, non-tender; bowel sounds normoactive; no organomegaly  Integument: Skin color, texture, turgor normal; no rashes; hair distrubution normal  Neurologic: Alert and oriented X 3, normal strength, normal " coordination and gait  Psychiatric: no pressured speech; normal affect; no evidence of impaired cognition     Labs:  Lab Results   Component Value Date    WBC 6.19 03/07/2024    HGB 13.5 03/07/2024    HCT 41.9 03/07/2024    MCV 91.5 03/07/2024     03/07/2024       CMP  Sodium   Date Value Ref Range Status   03/07/2024 140 136 - 145 mmol/L Final     Potassium   Date Value Ref Range Status   03/07/2024 3.8 3.5 - 5.1 mmol/L Final     Chloride   Date Value Ref Range Status   03/07/2024 103 98 - 107 mmol/L Final     CO2   Date Value Ref Range Status   03/07/2024 27 21 - 32 mmol/L Final     Glucose   Date Value Ref Range Status   03/07/2024 75 74 - 106 mg/dL Final     BUN   Date Value Ref Range Status   03/07/2024 11 7 - 18 mg/dL Final     Creatinine   Date Value Ref Range Status   03/07/2024 0.53 (L) 0.55 - 1.02 mg/dL Final     Calcium   Date Value Ref Range Status   03/07/2024 8.9 8.5 - 10.1 mg/dL Final     Total Protein   Date Value Ref Range Status   10/02/2023 6.4 6.4 - 8.2 g/dL Final     Albumin   Date Value Ref Range Status   10/02/2023 3.7 3.5 - 5.0 g/dL Final     Bilirubin, Total   Date Value Ref Range Status   10/02/2023 0.4 >0.0 - 1.2 mg/dL Final     Alk Phos   Date Value Ref Range Status   10/02/2023 59 46 - 118 U/L Final     AST   Date Value Ref Range Status   10/02/2023 14 (L) 15 - 37 U/L Final     ALT   Date Value Ref Range Status   10/02/2023 23 13 - 56 U/L Final     Anion Gap   Date Value Ref Range Status   03/07/2024 14 7 - 16 mmol/L Final     eGFR   Date Value Ref Range Status   06/16/2022 95 >=60 mL/min/1.73m² Final       Imaging: no pertinent imaging  CT Abdomen Pelvis With IV Contrast Routine Oral Contrast    (Results Pending)       Independently reviewed    Assessment: Muna Newberry 57yo WF here with:  LLQ pain  diverticulitis  Hx of diverticula    Plan:   Treat LLQ pain for diverticulitis flagyl 500 mg po tid and cipro 500 mg po bid.   CT abd/pelvis  Plan colonoscopy for July.  Follow up  in 4 weeks. If symptoms worsen, go straight to ED    30 minutes of total time spent on the encounter, which includes face to face time and non-face to face time preparing to see the patient (eg, review of tests), obtaining and/or reviewing separately obtained history, documenting clinical information in the electronic or other health record, Independently interpreting results (not separately reported) and communicating results to the patient/family/caregiver, or care coordination (not separately reported).         Hilda Hirsch, FNP [facility]

## 2024-05-08 ENCOUNTER — OFFICE VISIT (OUTPATIENT)
Dept: ORTHOPEDICS | Facility: CLINIC | Age: 59
End: 2024-05-08
Payer: COMMERCIAL

## 2024-05-08 DIAGNOSIS — Z09 POSTOP CHECK: Primary | ICD-10-CM

## 2024-05-08 PROCEDURE — 99024 POSTOP FOLLOW-UP VISIT: CPT | Mod: S$GLB,,, | Performed by: ORTHOPAEDIC SURGERY

## 2024-05-08 PROCEDURE — 1160F RVW MEDS BY RX/DR IN RCRD: CPT | Mod: S$GLB,,, | Performed by: ORTHOPAEDIC SURGERY

## 2024-05-08 PROCEDURE — 1159F MED LIST DOCD IN RCRD: CPT | Mod: S$GLB,,, | Performed by: ORTHOPAEDIC SURGERY

## 2024-05-08 PROCEDURE — 99213 OFFICE O/P EST LOW 20 MIN: CPT | Mod: PBBFAC | Performed by: ORTHOPAEDIC SURGERY

## 2024-05-08 RX ORDER — MELOXICAM 15 MG/1
15 TABLET ORAL DAILY
Qty: 30 TABLET | Refills: 3 | Status: SHIPPED | OUTPATIENT
Start: 2024-05-08

## 2024-05-08 NOTE — PROGRESS NOTES
CLINIC NOTE       Chief Complaint   Patient presents with    Left Shoulder - Post-op Evaluation        Muna Newberry is a 58 y.o. female seen today for recheck of her left shoulder.  She underwent arthroscopic subacromial decompression 03/12/2024.  Her postoperative course was uncomplicated 1 gradual improvement.  When last seen she was being sent to physical therapy but immediately thereafter contracted COVID.  She was now recovered from her viral illness and no longer feels the need for physical therapy left shoulder.  Today she demonstrates full left shoulder motion without crepitance or instability signs.  She has had some mild intermittent pain in her left knee.  She is he was taken Mobic in the past but is out.  Refill prescription will be provided.  Recheck as needed.      Past Medical History:   Diagnosis Date    Herpes zoster without complication 12/20/2021    Hyperplastic rectal polyp 12/19/2016    Idiopathic thrombotic thrombocytopenic purpura     Osteoarthrosis     Osteoporosis     Personal history of colonic polyps 12/19/2016    PONV (postoperative nausea and vomiting)      Family History   Problem Relation Name Age of Onset    Heart disease Mother      Leukemia Father      Breast cancer Paternal Aunt       Current Outpatient Medications on File Prior to Visit   Medication Sig Dispense Refill    albuterol (PROVENTIL) 2.5 mg /3 mL (0.083 %) nebulizer solution Take 3 mLs (2.5 mg total) by nebulization every 6 (six) hours as needed for Wheezing. Rescue 90 mL 0    albuterol (VENTOLIN HFA) 90 mcg/actuation inhaler Inhale 2 puffs into the lungs every 6 (six) hours as needed for Wheezing. Rescue 18 g 5    baclofen (LIORESAL) 10 MG tablet Take 10 mg by mouth as needed.      ciprofloxacin HCl (CIPRO) 500 MG tablet Take 1 tablet (500 mg total) by mouth every 12 (twelve) hours. 20 tablet 0    cyanocobalamin 1,000 mcg/mL injection 1,000 mcg every 28 days.      HYDROcodone-acetaminophen (NORCO) 5-325 mg per  tablet Take 0.5-1 tablets by mouth nightly as needed for Pain.      metroNIDAZOLE (FLAGYL) 500 MG tablet Take 1 tablet (500 mg total) by mouth every 8 (eight) hours. 30 tablet 0    ondansetron (ZOFRAN) 4 MG tablet Take 1 tablet (4 mg total) by mouth every 6 (six) hours as needed for Nausea. 20 tablet 0    pantoprazole (PROTONIX) 40 MG tablet Take 40 mg by mouth daily as needed (prn).      rimegepant (NURTEC) 75 mg odt Take 1 tablet (75 mg total) by mouth once as needed for Migraine. Place ODT tablet on the tongue; alternatively the ODT tablet may be placed under the tongue 16 tablet 2    SYNTHROID 88 mcg tablet Take 1 tablet (88 mcg total) by mouth before breakfast. Brand name only. 90 tablet 1    traMADoL (ULTRAM) 50 mg tablet Take 50 mg by mouth nightly. Take 1/2-1 tablet at bedtime daily prn.       No current facility-administered medications on file prior to visit.       ROS     There were no vitals filed for this visit.    Past Surgical History:   Procedure Laterality Date    APPENDECTOMY      ARTHROSCOPIC DEBRIDEMENT OF SHOULDER  3/12/2024    Procedure: DEBRIDEMENT, SHOULDER, ARTHROSCOPIC;  Surgeon: Salomon Sosa MD;  Location: [facility];  Service: Orthopedics;;    ARTHROSCOPY OF SHOULDER WITH DECOMPRESSION OF SUBACROMIAL SPACE Left 3/12/2024    Procedure: ARTHROSCOPY, SHOULDER, WITH SUBACROMIAL SPACE DECOMPRESSION;  Surgeon: Salomon Sosa MD;  Location: [facility];  Service: Orthopedics;  Laterality: Left;    BREAST BIOPSY      CHOLECYSTECTOMY      COLONOSCOPY  12/19/2016    Dr. Ed [facility] - [facility]    HYSTERECTOMY      OOPHORECTOMY      SHOULDER ARTHROSCOPY Left 3/12/2024    Procedure: ARTHROSCOPY, SHOULDER;  Surgeon: Salomon Sosa MD;  Location: [facility];  Service: Orthopedics;  Laterality: Left;    SPINAL CORD STIMULATOR IMPLANT      TONSILLECTOMY          Review of patient's allergies indicates:   Allergen Reactions    Phenergan [promethazine]       IV phenergan only    Sulfa (sulfonamide antibiotics) Blisters    Opioids - morphine analogues Rash    Penicillins Rash        Ortho Exam     Radiographic Examination:    Technique:    Findings:    Impression:   See Above    Assessment and Plan  Patient Active Problem List    Diagnosis Date Noted    Diverticulitis 04/23/2024    Left lower quadrant pain 04/23/2024    Shoulder impingement 02/19/2024    Traumatic incomplete tear of left rotator cuff 01/24/2024    Left shoulder pain 01/08/2024    Chronic pain of multiple joints 10/02/2023    Primary osteoarthritis involving multiple joints     Cobalamin deficiency     Occipital neuralgia of left side 06/01/2023    Restless legs syndrome 12/27/2022    Idiopathic chronic pancreatitis 12/27/2022    Iron deficiency anemia 12/27/2022    Chronic migraine with aura 12/27/2022    Primary osteoarthritis of left hand 12/06/2021    Acquired hypothyroidism 04/21/2021    Osteoporosis 04/21/2021    Vitamin D deficiency 04/21/2021    Mild intermittent asthma without complication 04/21/2021    Chronic sinusitis 01/25/2021    Chronic pain 06/13/2020          Salomon Sosa M.D.

## 2024-05-09 ENCOUNTER — HOSPITAL ENCOUNTER (OUTPATIENT)
Dept: RADIOLOGY | Facility: HOSPITAL | Age: 59
Discharge: HOME | End: 2024-05-09
Payer: COMMERCIAL

## 2024-05-09 DIAGNOSIS — K57.92 DIVERTICULITIS: ICD-10-CM

## 2024-05-09 DIAGNOSIS — R10.32 LEFT LOWER QUADRANT PAIN: ICD-10-CM

## 2024-05-09 PROCEDURE — 74177 CT ABD & PELVIS W/CONTRAST: CPT | Mod: 26,,, | Performed by: RADIOLOGY

## 2024-05-09 PROCEDURE — 74177 CT ABD & PELVIS W/CONTRAST: CPT | Mod: TC

## 2024-05-09 PROCEDURE — 25500020 PHARM REV CODE 255

## 2024-05-09 RX ADMIN — IOPAMIDOL 100 ML: 755 INJECTION, SOLUTION INTRAVENOUS at 08:05

## 2024-05-30 ENCOUNTER — HOSPITAL ENCOUNTER (OUTPATIENT)
Dept: RADIOLOGY | Facility: HOSPITAL | Age: 59
Discharge: HOME | End: 2024-05-30
Attending: ANESTHESIOLOGY
Payer: COMMERCIAL

## 2024-05-30 DIAGNOSIS — M54.2 CERVICALGIA: ICD-10-CM

## 2024-05-30 DIAGNOSIS — M54.6 PAIN IN THORACIC SPINE: ICD-10-CM

## 2024-05-30 PROCEDURE — 72070 X-RAY EXAM THORAC SPINE 2VWS: CPT | Mod: TC

## 2024-05-30 PROCEDURE — 72070 X-RAY EXAM THORAC SPINE 2VWS: CPT | Mod: 26,,, | Performed by: RADIOLOGY

## 2024-05-30 PROCEDURE — 72050 X-RAY EXAM NECK SPINE 4/5VWS: CPT | Mod: TC

## 2024-05-30 PROCEDURE — 72050 X-RAY EXAM NECK SPINE 4/5VWS: CPT | Mod: 26,,, | Performed by: RADIOLOGY

## 2024-06-05 DIAGNOSIS — M25.562 ACUTE PAIN OF LEFT KNEE: Primary | ICD-10-CM

## 2024-07-03 ENCOUNTER — OFFICE VISIT (OUTPATIENT)
Dept: ORTHOPEDICS | Facility: CLINIC | Age: 59
End: 2024-07-03
Payer: COMMERCIAL

## 2024-07-03 DIAGNOSIS — M25.562 LEFT KNEE PAIN, UNSPECIFIED CHRONICITY: Primary | ICD-10-CM

## 2024-07-03 PROCEDURE — 99999 PR PBB SHADOW E&M-EST. PATIENT-LVL III: CPT | Mod: PBBFAC,,, | Performed by: ORTHOPAEDIC SURGERY

## 2024-07-03 PROCEDURE — 63600175 PHARM REV CODE 636 W HCPCS

## 2024-07-03 NOTE — PROGRESS NOTES
CLINIC NOTE       Chief Complaint   Patient presents with    Left Knee - Follow-up        Muna Newberry is a 58 y.o. female seen today for recheck of her left knee.  He was continues to have some discomfort limiting her activities.  She was had to curtail walking for exercise.  She was not using an oral anti-inflammatory medication.  She was not had any injections in her knee.  She did undergo MRI examination left knee on 06/25/2021.  The MRI showed thickened anterior cruciate ligament with in substance fluid signal in some degenerative changes in cyst formation at the origin insertion.  There was no evidence of complete disruption.  She was not had instability signs or giving way.  The menisci were intact     Past Medical History:   Diagnosis Date    Herpes zoster without complication 12/20/2021    Hyperplastic rectal polyp 12/19/2016    Idiopathic thrombotic thrombocytopenic purpura     Osteoarthrosis     Osteoporosis     Personal history of colonic polyps 12/19/2016    PONV (postoperative nausea and vomiting)      Family History   Problem Relation Name Age of Onset    Heart disease Mother      Leukemia Father      Breast cancer Paternal Aunt       Current Outpatient Medications on File Prior to Visit   Medication Sig Dispense Refill    albuterol (PROVENTIL) 2.5 mg /3 mL (0.083 %) nebulizer solution Take 3 mLs (2.5 mg total) by nebulization every 6 (six) hours as needed for Wheezing. Rescue 90 mL 0    albuterol (VENTOLIN HFA) 90 mcg/actuation inhaler Inhale 2 puffs into the lungs every 6 (six) hours as needed for Wheezing. Rescue 18 g 5    baclofen (LIORESAL) 10 MG tablet Take 10 mg by mouth as needed.      ciprofloxacin HCl (CIPRO) 500 MG tablet Take 1 tablet (500 mg total) by mouth every 12 (twelve) hours. 20 tablet 0    cyanocobalamin 1,000 mcg/mL injection 1,000 mcg every 28 days.      HYDROcodone-acetaminophen (NORCO) 5-325 mg per tablet Take 0.5-1 tablets by mouth nightly as needed for Pain.       meloxicam (MOBIC) 15 MG tablet Take 1 tablet (15 mg total) by mouth once daily. 30 tablet 3    metroNIDAZOLE (FLAGYL) 500 MG tablet Take 1 tablet (500 mg total) by mouth every 8 (eight) hours. 30 tablet 0    ondansetron (ZOFRAN) 4 MG tablet Take 1 tablet (4 mg total) by mouth every 6 (six) hours as needed for Nausea. 20 tablet 0    pantoprazole (PROTONIX) 40 MG tablet Take 40 mg by mouth daily as needed (prn).      rimegepant (NURTEC) 75 mg odt Take 1 tablet (75 mg total) by mouth once as needed for Migraine. Place ODT tablet on the tongue; alternatively the ODT tablet may be placed under the tongue 16 tablet 2    SYNTHROID 88 mcg tablet Take 1 tablet (88 mcg total) by mouth before breakfast. Brand name only. 90 tablet 1    traMADoL (ULTRAM) 50 mg tablet Take 50 mg by mouth nightly. Take 1/2-1 tablet at bedtime daily prn.       No current facility-administered medications on file prior to visit.       ROS     There were no vitals filed for this visit.    Past Surgical History:   Procedure Laterality Date    APPENDECTOMY      ARTHROSCOPIC DEBRIDEMENT OF SHOULDER  3/12/2024    Procedure: DEBRIDEMENT, SHOULDER, ARTHROSCOPIC;  Surgeon: Salomon Sosa MD;  Location: [facility];  Service: Orthopedics;;    ARTHROSCOPY OF SHOULDER WITH DECOMPRESSION OF SUBACROMIAL SPACE Left 3/12/2024    Procedure: ARTHROSCOPY, SHOULDER, WITH SUBACROMIAL SPACE DECOMPRESSION;  Surgeon: Salomon Sosa MD;  Location: [facility] OR;  Service: Orthopedics;  Laterality: Left;    BREAST BIOPSY      CHOLECYSTECTOMY      COLONOSCOPY  12/19/2016    Dr. Ed [facility] - [facility]    HYSTERECTOMY      OOPHORECTOMY      SHOULDER ARTHROSCOPY Left 3/12/2024    Procedure: ARTHROSCOPY, SHOULDER;  Surgeon: Salomon Sosa MD;  Location: [facility] OR;  Service: Orthopedics;  Laterality: Left;    SPINAL CORD STIMULATOR IMPLANT      TONSILLECTOMY          Review of patient's allergies indicates:   Allergen Reactions     Phenergan [promethazine]      IV phenergan only    Sulfa (sulfonamide antibiotics) Blisters    Opioids - morphine analogues Rash    Penicillins Rash        Ortho Exam :  Left knee shows no sign of intra-articular effusion.  Knee range motion is 0-135 degrees of flexion.  Lachman exam is negative as is the FR D.  There is no medial or lateral ligamentous instability appreciated.    Radiographic Examination:    Technique:    Findings:    Impression:   See Above    Assessment and Plan  Patient Active Problem List    Diagnosis Date Noted    Postop check 05/08/2024    Diverticulitis 04/23/2024    Left lower quadrant pain 04/23/2024    Shoulder impingement 02/19/2024    Traumatic incomplete tear of left rotator cuff 01/24/2024    Left shoulder pain 01/08/2024    Chronic pain of multiple joints 10/02/2023    Primary osteoarthritis involving multiple joints     Cobalamin deficiency     Occipital neuralgia of left side 06/01/2023    Restless legs syndrome 12/27/2022    Idiopathic chronic pancreatitis 12/27/2022    Iron deficiency anemia 12/27/2022    Chronic migraine with aura 12/27/2022    Primary osteoarthritis of left hand 12/06/2021    Acquired hypothyroidism 04/21/2021    Osteoporosis 04/21/2021    Vitamin D deficiency 04/21/2021    Mild intermittent asthma without complication 04/21/2021    Chronic sinusitis 01/25/2021    Chronic pain 06/13/2020    Impression:  Left knee pain.  Patient was advised ACL findings are more consistent with the degeneration we had not cause pain but rather instability if deficiency was present.  Again she indicates he was been no evidence instability giving way.  Mild DJD remains a possibility.  Plan:  Left knee today was prepped with Betadine intra-articular steroid injection performed with triamcinolone and Marcaine 1 cc each.  Discussed the option for viscosupplementation injection as well and literature given.  She was advised I do not at this point see an indication for knee  arthroscopy      Salomon Sosa M.D.

## 2024-07-08 ENCOUNTER — OFFICE VISIT (OUTPATIENT)
Dept: OTOLARYNGOLOGY | Facility: CLINIC | Age: 59
End: 2024-07-08
Payer: COMMERCIAL

## 2024-07-08 ENCOUNTER — CLINICAL SUPPORT (OUTPATIENT)
Dept: AUDIOLOGY | Facility: CLINIC | Age: 59
End: 2024-07-08
Payer: COMMERCIAL

## 2024-07-08 VITALS — HEIGHT: 61 IN | WEIGHT: 141 LBS | BODY MASS INDEX: 26.62 KG/M2

## 2024-07-08 DIAGNOSIS — H90.3 SENSORINEURAL HEARING LOSS (SNHL) OF BOTH EARS: Primary | ICD-10-CM

## 2024-07-08 DIAGNOSIS — H90.3 SENSORY HEARING LOSS, BILATERAL: Primary | ICD-10-CM

## 2024-07-08 DIAGNOSIS — H90.3 SENSORINEURAL HEARING LOSS, BILATERAL: Primary | ICD-10-CM

## 2024-07-08 DIAGNOSIS — H93.19 TINNITUS, UNSPECIFIED LATERALITY: ICD-10-CM

## 2024-07-08 PROCEDURE — 99999 PR PBB SHADOW E&M-EST. PATIENT-LVL III: CPT | Mod: PBBFAC,,, | Performed by: OTOLARYNGOLOGY

## 2024-07-08 PROCEDURE — 99999 PR PBB SHADOW E&M-EST. PATIENT-LVL II: CPT | Mod: PBBFAC,,,

## 2024-07-08 PROCEDURE — 99999 PR PBB SHADOW E&M-EST. PATIENT-LVL II: CPT | Mod: PBBFAC,,, | Performed by: AUDIOLOGIST

## 2024-07-08 PROCEDURE — 99499 UNLISTED E&M SERVICE: CPT | Mod: S$GLB,,, | Performed by: OTOLARYNGOLOGY

## 2024-07-08 PROCEDURE — 3008F BODY MASS INDEX DOCD: CPT | Mod: S$GLB,,, | Performed by: OTOLARYNGOLOGY

## 2024-07-08 PROCEDURE — 1159F MED LIST DOCD IN RCRD: CPT | Mod: S$GLB,,, | Performed by: OTOLARYNGOLOGY

## 2024-07-08 PROCEDURE — 99204 OFFICE O/P NEW MOD 45 MIN: CPT | Mod: S$GLB,,, | Performed by: OTOLARYNGOLOGY

## 2024-07-08 PROCEDURE — 1160F RVW MEDS BY RX/DR IN RCRD: CPT | Mod: S$GLB,,, | Performed by: OTOLARYNGOLOGY

## 2024-07-08 NOTE — PATIENT INSTRUCTIONS
"  WNL sloping to moderate SNHL AD; moderate sloping to severe SNHL AS; verbal ("YES") response; RECOMMEND ANNUAL AUDIO. HUMA. (SCHEDULED FOR 8 JULY 2025 @ 1300) (SOONER IF NEEDED) & NEW HAs  GAVE AVS IN-CLINIC  "

## 2024-07-08 NOTE — PROGRESS NOTES
Subjective:       Patient ID: Muna Newberry is a 58 y.o. female.    Chief Complaint: Hearing Loss (Bilateral hearing loss. Hearing test done. )    Hearing Loss:    Associated symptoms: Tinnitus.      Review of Systems   HENT:  Positive for hearing loss and tinnitus.    All other systems reviewed and are negative.      Objective:      Physical Exam  General: NAD  Head: Normocephalic, atraumatic, no facial asymmetry/normal strength,  Ears: Both auricules normal in appearance, w/o deformities tympanic membranes normal external auditory canals normal  Nose: External nose w/o deformities normal turbinates no drainage or inflammation  Oral Cavity: Lips, gums, floor of mouth, tongue hard palate, and buccal mucosa without mass/lesion  Oropharynx: Mucosa pink and moist, soft palate, posterior pharynx and oropharyngeal wall without mass/lesion  Neck: Supple, symmetric, trachea midline, no palpable mass/lesion, no palpable cervical lymphadenopathy  Skin: Warm and dry, no concerning lesions  Respiratory: Respirations even, unlabored  Assessment:       1. Sensorineural hearing loss (SNHL) of both ears    2. Tinnitus, unspecified laterality        Plan:       Audio reviewed and interpreted in detail   Rec hearing aids

## 2024-07-09 ENCOUNTER — HOSPITAL ENCOUNTER (OUTPATIENT)
Dept: RADIOLOGY | Facility: HOSPITAL | Age: 59
Discharge: HOME | End: 2024-07-09
Attending: NURSE PRACTITIONER
Payer: COMMERCIAL

## 2024-07-09 VITALS — WEIGHT: 140 LBS | BODY MASS INDEX: 26.43 KG/M2 | HEIGHT: 61 IN

## 2024-07-09 DIAGNOSIS — Z78.0 POSTMENOPAUSAL: ICD-10-CM

## 2024-07-09 DIAGNOSIS — M81.0 OSTEOPOROSIS WITHOUT CURRENT PATHOLOGICAL FRACTURE, UNSPECIFIED OSTEOPOROSIS TYPE: Chronic | ICD-10-CM

## 2024-07-09 DIAGNOSIS — Z12.31 BREAST CANCER SCREENING BY MAMMOGRAM: ICD-10-CM

## 2024-07-09 PROCEDURE — 77080 DXA BONE DENSITY AXIAL: CPT | Mod: 26,,, | Performed by: RADIOLOGY

## 2024-07-09 PROCEDURE — 77067 SCR MAMMO BI INCL CAD: CPT | Mod: TC

## 2024-07-09 PROCEDURE — 77080 DXA BONE DENSITY AXIAL: CPT | Mod: TC

## 2024-07-10 NOTE — PROGRESS NOTES
Call pt and review results.  Is she already seeing someone for osteoporosis?  I do not see Fosamax, Boniva, or other treatment on her med list.  I do not think Prolia injections would show up on her med list?.  If she is not already seeing someone, I would like to have her see Char Todd DNP for management of osteoporosis.

## 2024-07-11 DIAGNOSIS — M81.0 OSTEOPOROSIS, UNSPECIFIED OSTEOPOROSIS TYPE, UNSPECIFIED PATHOLOGICAL FRACTURE PRESENCE: Primary | Chronic | ICD-10-CM

## 2024-07-29 DIAGNOSIS — Z98.890 STATUS POST ARTHROSCOPY OF LEFT SHOULDER: Primary | ICD-10-CM

## 2024-07-31 ENCOUNTER — TELEPHONE (OUTPATIENT)
Dept: FAMILY MEDICINE | Facility: CLINIC | Age: 59
End: 2024-07-31
Payer: COMMERCIAL

## 2024-07-31 DIAGNOSIS — E03.9 ACQUIRED HYPOTHYROIDISM: Primary | Chronic | ICD-10-CM

## 2024-07-31 NOTE — TELEPHONE ENCOUNTER
----- Message from Summer Prater sent at 7/31/2024  9:14 AM CDT -----   Pt wanting to talk to you about her Thyroid Med she is on Pt # [phone]

## 2024-07-31 NOTE — TELEPHONE ENCOUNTER
Patient called stating the increase of thyroid medication (88mcg) was causing hair to falling out.  Patient has went back to the synthroid 75mcg.  After looking the thyroid was increased to 88 mcg back in October 2023. Told patient that the increase of the thyroid medication would not cause hair to fall out.   Instructed patient to come in and have her lab drawn to see if medication needs to be adjusted or if this is related to hormones.

## 2024-08-12 PROBLEM — Z09 POSTOP CHECK: Status: RESOLVED | Noted: 2024-05-08 | Resolved: 2024-08-12

## 2024-09-24 ENCOUNTER — PATIENT MESSAGE (OUTPATIENT)
Dept: ORTHOPEDICS | Facility: CLINIC | Age: 59
End: 2024-09-24
Payer: COMMERCIAL

## 2024-09-24 DIAGNOSIS — M17.12 PRIMARY OSTEOARTHRITIS OF LEFT KNEE: Primary | ICD-10-CM

## 2024-10-03 ENCOUNTER — OFFICE VISIT (OUTPATIENT)
Dept: FAMILY MEDICINE | Facility: CLINIC | Age: 59
End: 2024-10-03
Payer: COMMERCIAL

## 2024-10-03 VITALS
WEIGHT: 145.63 LBS | BODY MASS INDEX: 27.5 KG/M2 | TEMPERATURE: 98 F | SYSTOLIC BLOOD PRESSURE: 110 MMHG | HEART RATE: 66 BPM | HEIGHT: 61 IN | RESPIRATION RATE: 18 BRPM | OXYGEN SATURATION: 98 % | DIASTOLIC BLOOD PRESSURE: 71 MMHG

## 2024-10-03 DIAGNOSIS — E53.8 COBALAMIN DEFICIENCY: Chronic | ICD-10-CM

## 2024-10-03 DIAGNOSIS — Z13.220 SCREENING FOR LIPOID DISORDERS: ICD-10-CM

## 2024-10-03 DIAGNOSIS — Z11.4 SCREENING FOR HIV (HUMAN IMMUNODEFICIENCY VIRUS): ICD-10-CM

## 2024-10-03 DIAGNOSIS — Z13.1 SCREENING FOR DIABETES MELLITUS: ICD-10-CM

## 2024-10-03 DIAGNOSIS — Z79.899 OTHER LONG TERM (CURRENT) DRUG THERAPY: ICD-10-CM

## 2024-10-03 DIAGNOSIS — Z23 FLU VACCINE NEED: ICD-10-CM

## 2024-10-03 DIAGNOSIS — E03.9 ACQUIRED HYPOTHYROIDISM: Chronic | ICD-10-CM

## 2024-10-03 DIAGNOSIS — E55.9 VITAMIN D DEFICIENCY: Chronic | ICD-10-CM

## 2024-10-03 DIAGNOSIS — Z00.00 ROUTINE GENERAL MEDICAL EXAMINATION AT A HEALTH CARE FACILITY: Primary | ICD-10-CM

## 2024-10-03 LAB
25(OH)D3 SERPL-MCNC: 41.5 NG/ML
ALBUMIN SERPL BCP-MCNC: 3.7 G/DL (ref 3.5–5)
ALBUMIN/GLOB SERPL: 1.2 {RATIO}
ALP SERPL-CCNC: 57 U/L (ref 50–130)
ALT SERPL W P-5'-P-CCNC: 22 U/L (ref 13–56)
ANION GAP SERPL CALCULATED.3IONS-SCNC: 8 MMOL/L (ref 7–16)
AST SERPL W P-5'-P-CCNC: 15 U/L (ref 15–37)
BILIRUB SERPL-MCNC: 0.4 MG/DL (ref ?–1.2)
BUN SERPL-MCNC: 8 MG/DL (ref 7–18)
BUN/CREAT SERPL: 14 (ref 6–20)
CALCIUM SERPL-MCNC: 8.8 MG/DL (ref 8.5–10.1)
CHLORIDE SERPL-SCNC: 108 MMOL/L (ref 98–107)
CHOLEST SERPL-MCNC: 196 MG/DL (ref 0–200)
CHOLEST/HDLC SERPL: 2.8 {RATIO}
CO2 SERPL-SCNC: 27 MMOL/L (ref 21–32)
CREAT SERPL-MCNC: 0.58 MG/DL (ref 0.55–1.02)
EGFR (NO RACE VARIABLE) (RUSH/TITUS): 104 ML/MIN/1.73M2
FOLATE SERPL-MCNC: >20 NG/ML (ref 3.1–17.5)
GLOBULIN SER-MCNC: 3 G/DL (ref 2–4)
GLUCOSE SERPL-MCNC: 79 MG/DL (ref 74–106)
HDLC SERPL-MCNC: 71 MG/DL (ref 40–60)
HIV 1+O+2 AB SERPL QL: NORMAL
LDLC SERPL CALC-MCNC: 111 MG/DL
LDLC/HDLC SERPL: 1.6 {RATIO}
NONHDLC SERPL-MCNC: 125 MG/DL
POTASSIUM SERPL-SCNC: 4.1 MMOL/L (ref 3.5–5.1)
PROT SERPL-MCNC: 6.7 G/DL (ref 6.4–8.2)
SODIUM SERPL-SCNC: 139 MMOL/L (ref 136–145)
TRIGL SERPL-MCNC: 72 MG/DL (ref 35–150)
VIT B12 SERPL-MCNC: 624 PG/ML (ref 193–986)
VLDLC SERPL-MCNC: 14 MG/DL

## 2024-10-03 PROCEDURE — 99396 PREV VISIT EST AGE 40-64: CPT | Mod: 25,,, | Performed by: NURSE PRACTITIONER

## 2024-10-03 PROCEDURE — 90656 IIV3 VACC NO PRSV 0.5 ML IM: CPT | Mod: ,,, | Performed by: NURSE PRACTITIONER

## 2024-10-03 PROCEDURE — 3008F BODY MASS INDEX DOCD: CPT | Mod: ,,, | Performed by: NURSE PRACTITIONER

## 2024-10-03 PROCEDURE — 82607 VITAMIN B-12: CPT | Mod: ,,, | Performed by: CLINICAL MEDICAL LABORATORY

## 2024-10-03 PROCEDURE — 80061 LIPID PANEL: CPT | Mod: ,,, | Performed by: CLINICAL MEDICAL LABORATORY

## 2024-10-03 PROCEDURE — 90471 IMMUNIZATION ADMIN: CPT | Mod: ,,, | Performed by: NURSE PRACTITIONER

## 2024-10-03 PROCEDURE — 3078F DIAST BP <80 MM HG: CPT | Mod: ,,, | Performed by: NURSE PRACTITIONER

## 2024-10-03 PROCEDURE — 80053 COMPREHEN METABOLIC PANEL: CPT | Mod: ,,, | Performed by: CLINICAL MEDICAL LABORATORY

## 2024-10-03 PROCEDURE — 1160F RVW MEDS BY RX/DR IN RCRD: CPT | Mod: ,,, | Performed by: NURSE PRACTITIONER

## 2024-10-03 PROCEDURE — 87389 HIV-1 AG W/HIV-1&-2 AB AG IA: CPT | Mod: ,,, | Performed by: CLINICAL MEDICAL LABORATORY

## 2024-10-03 PROCEDURE — 82306 VITAMIN D 25 HYDROXY: CPT | Mod: ,,, | Performed by: CLINICAL MEDICAL LABORATORY

## 2024-10-03 PROCEDURE — 1159F MED LIST DOCD IN RCRD: CPT | Mod: ,,, | Performed by: NURSE PRACTITIONER

## 2024-10-03 PROCEDURE — 82746 ASSAY OF FOLIC ACID SERUM: CPT | Mod: ,,, | Performed by: CLINICAL MEDICAL LABORATORY

## 2024-10-03 PROCEDURE — 3074F SYST BP LT 130 MM HG: CPT | Mod: ,,, | Performed by: NURSE PRACTITIONER

## 2024-10-03 NOTE — PROGRESS NOTES
[facility]  [address] [place] [address]  Phone: [phone]  Fax: [phone]     PATIENT NAME: Muna Newberry   : 1965    AGE: 59 y.o. DATE OF ENCOUNTER: 10/3/24    Provider:    MRN: [id number]      Subjective     Patient ID: Muna Newberry is a 59 y.o. female.    Chief Complaint: Healthy You (Patient reports to the clinic for [employer] Healthy You, she did not have lunch today.) and Health Maintenance (Care gaps addressed, she would like her flu vaccine today.//Kaylin Harris CMA)    HPI  Healthy you  Doing okay, no complaints aside from ongoing issues related to shoulder recovery and fatigue from not sleeping well.    Review of Systems   Constitutional:  Positive for fatigue.   Respiratory: Negative.     Cardiovascular: Negative.    Gastrointestinal: Negative.    Musculoskeletal:  Positive for arthralgias.   Integumentary:  Negative.   Neurological: Negative.    Psychiatric/Behavioral:  Positive for sleep disturbance.        Tobacco Use: Low Risk  (10/3/2024)    Patient History     Smoking Tobacco Use: Never     Smokeless Tobacco Use: Never     Passive Exposure: Not on file     Review of patient's allergies indicates:   Allergen Reactions    Phenergan [promethazine]      IV phenergan only    Sulfa (sulfonamide antibiotics) Blisters    Opioids - morphine analogues Rash    Penicillins Rash     Current Outpatient Medications   Medication Instructions    albuterol (VENTOLIN HFA) 90 mcg/actuation inhaler 2 puffs, Inhalation, Every 6 hours PRN, Rescue    baclofen (LIORESAL) 10 mg, As needed (PRN)    cyanocobalamin 1,000 mcg, Every 28 days    HYDROcodone-acetaminophen (NORCO) 5-325 mg per tablet 0.5-1 tablets, Nightly PRN    meloxicam (MOBIC) 15 mg, Oral, Daily    NURTEC 75 mg, Oral, Once as needed, Place ODT tablet on the tongue; alternatively the ODT tablet may be placed under the tongue    ondansetron (ZOFRAN) 4 mg, Oral, Every 6 hours PRN    SYNTHROID 88 mcg, Oral,  "Before breakfast, Brand name only.    traMADoL (ULTRAM) 50 mg, Nightly     Medications Discontinued During This Encounter   Medication Reason    ciprofloxacin HCl (CIPRO) 500 MG tablet Therapy completed    metroNIDAZOLE (FLAGYL) 500 MG tablet Therapy completed    pantoprazole (PROTONIX) 40 MG tablet Patient no longer taking    SYNTHROID 88 mcg tablet Reorder       Past Medical History:   Diagnosis Date    Herpes zoster without complication 12/20/2021    Hyperplastic rectal polyp 12/19/2016    Idiopathic thrombotic thrombocytopenic purpura     Osteoarthrosis     Osteoporosis     Personal history of colonic polyps 12/19/2016    PONV (postoperative nausea and vomiting)      Health Maintenance Topics with due status: Not Due       Topic Last Completion Date    Colorectal Cancer Screening 12/19/2016    Hemoglobin A1c (Diabetic Prevention Screening) 10/02/2023    Mammogram 07/09/2024    DEXA Scan 07/09/2024    Lipid Panel 10/03/2024     Immunization History   Administered Date(s) Administered    COVID-19, MRNA, LN-S, PF (Pfizer) (Gray Cap) 07/02/2021    COVID-19, MRNA, LN-S, PF (Pfizer) (Purple Cap) 05/28/2021    Influenza 10/17/2003    Influenza - Trivalent - Fluarix, Flulaval, Fluzone, Afluria - PF 10/03/2024       Objective     Body mass index is 27.51 kg/m².  Wt Readings from Last 3 Encounters:   10/03/24 66 kg (145 lb 9.6 oz)   07/09/24 63.5 kg (140 lb)   07/08/24 64 kg (141 lb)     Ht Readings from Last 3 Encounters:   10/03/24 5' 1" (1.549 m)   07/09/24 5' 1" (1.549 m)   07/08/24 5' 1" (1.549 m)     BP Readings from Last 3 Encounters:   10/03/24 110/71   04/23/24 (!) 117/44   03/25/24 107/71     Temp Readings from Last 3 Encounters:   10/03/24 98.3 °F (36.8 °C) (Oral)   03/25/24 98.7 °F (37.1 °C) (Oral)   03/12/24 97.5 °F (36.4 °C)     Pulse Readings from Last 3 Encounters:   10/03/24 66   04/23/24 73   03/25/24 87     Resp Readings from Last 3 Encounters:   10/03/24 18   03/25/24 20   03/12/24 18     PF Readings " from Last 3 Encounters:   No data found for PF       Physical Exam  Vitals and nursing note reviewed.   Constitutional:       General: She is not in acute distress.     Appearance: Normal appearance. She is not ill-appearing.   HENT:      Head: Normocephalic.      Right Ear: Tympanic membrane, ear canal and external ear normal.      Left Ear: Tympanic membrane, ear canal and external ear normal.      Nose: Nose normal.      Mouth/Throat:      Mouth: Mucous membranes are moist.      Pharynx: Oropharynx is clear. Uvula midline. No posterior oropharyngeal erythema or uvula swelling.   Eyes:      Conjunctiva/sclera: Conjunctivae normal.      Pupils: Pupils are equal, round, and reactive to light.   Neck:      Thyroid: No thyroid mass or thyromegaly.      Vascular: Normal carotid pulses. No carotid bruit.   Cardiovascular:      Rate and Rhythm: Normal rate and regular rhythm.      Pulses: Normal pulses.      Heart sounds: Normal heart sounds.   Pulmonary:      Effort: Pulmonary effort is normal. No respiratory distress.      Breath sounds: Normal breath sounds. No wheezing, rhonchi or rales.   Abdominal:      Palpations: Abdomen is soft.      Tenderness: There is no abdominal tenderness.   Musculoskeletal:      Cervical back: Neck supple.      Right lower leg: No edema.      Left lower leg: No edema.   Lymphadenopathy:      Cervical: No cervical adenopathy.   Skin:     General: Skin is warm and dry.      Coloration: Skin is not jaundiced or pale.   Neurological:      General: No focal deficit present.      Mental Status: She is alert and oriented to person, place, and time.      Gait: Gait normal.   Psychiatric:         Mood and Affect: Mood normal.         Behavior: Behavior normal.         Assessment and Plan     1. Routine general medical examination at a health care facility    2. Screening for diabetes mellitus  -     Hemoglobin A1C    3. Screening for lipoid disorders  -     Lipid Panel; Future; Expected date:  10/03/2024    4. Flu vaccine need  -     influenza (Flulaval, Fluzone, Fluarix) 45 mcg/0.5 mL IM vaccine (> or = 6 mo) 0.5 mL    5. Acquired hypothyroidism  Assessment & Plan:  Lab Results   Component Value Date    TSH 2.780 08/01/2024     Continue synthroid.    Orders:  -     SYNTHROID 88 mcg tablet; Take 1 tablet (88 mcg total) by mouth before breakfast. Brand name only.  Dispense: 90 tablet; Refill: 3    6. Vitamin D deficiency  -     Vitamin D; Future; Expected date: 10/03/2024    7. Cobalamin deficiency  Assessment & Plan:   Latest Reference Range & Units 03/20/23 18:03   Vitamin B12 193 - 986 pg/mL 269     On B12 1 ml IM every 4 wks.  Recheck level and advise accordingly.    Lab Results   Component Value Date    [id number] 624 10/03/2024     Continue current treatment plan.    Orders:  -     Vitamin B12 & Folate; Future; Expected date: 10/03/2024    8. Screening for HIV (human immunodeficiency virus)  -     HIV 1/2 Ag/Ab (4th Gen); Future; Expected date: 10/03/2024    9. Other long term (current) drug therapy  -     Comprehensive Metabolic Panel; Future; Expected date: 10/03/2024         Plan: Screening labs plus any other labs needed for long term monitoring and HIV screen for care gaps.  Additional treatment plan as above.          I have reviewed the medications, allergies, and problem list.     Goal Actions:    What type of visit is the patient here for today?: Healthy You  Does the patient consent to enroll in [facility] Healthy?: No  Is this a Wellness Follow Up?: No  What is your overall wellness goal? (select at least one): Improve overall health, Lose weight  Choose 3: Biometric, Lifestyle, Nutrition  Biometric Actions: Attend regularly scheduled office visits  Lifestyle Actions : Take medications as prescribed  Nurtrition Actions: Eat heart healthy diet, drink 8-10 glasses of water per day      Follow up for Healthy You 1 yr with fasting labs, and follow-up as needed.    Signature:  Sharita CARNES  TOMER Briggs-BC    Future Appointments   Date Time Provider Department Center   10/7/2024 11:20 AM Salomon Sosa MD [facility] ORTHO [facility]   11/6/2024  2:30 PM Gisella Todd NP [facility]   7/8/2025  1:00 PM Radha Davis [facility] AUDIO [facility]   7/15/2025 12:40 PM [facility] MAMMO[facility] [place]   10/22/2025  7:40 AM Sharita Briggs FNP [facility] RAMON [place]

## 2024-10-04 ENCOUNTER — PATIENT OUTREACH (OUTPATIENT)
Facility: HOSPITAL | Age: 59
End: 2024-10-04
Payer: COMMERCIAL

## 2024-10-04 DIAGNOSIS — Z13.1 SCREENING FOR DIABETES MELLITUS: Primary | ICD-10-CM

## 2024-10-04 NOTE — PROGRESS NOTES
Population Health Chart Review & Patient Outreach Details      Further Action Needed If Patient Returns Outreach:            Updates Requested / Reviewed:     []  Care Everywhere    []     []  External Sources ([facility], [facility], DIS, etc.)    [] [facility]   [] [facility]   [] Other:    []  Care Team Updated   []  Removed  or Duplicate Orders   []  Immunization Reconciliation Completed / Queried    [] [place]   [] [place]   [] [place]   [] [place]      Health Maintenance Topics Addressed and Outreach Outcomes / Actions Taken:             Breast Cancer Screening []  Mammogram Order Placed    []  Mammogram Screening Scheduled    []  External Records Requested & Care Team Updated if Applicable    []  External Records Uploaded & Care Team Updated if Applicable    []  Pt Declined Scheduling Mammogram    []  Pt Will Schedule with External Provider / Order Routed & Care Team Updated if Applicable              Cervical Cancer Screening []  Pap Smear Scheduled in Primary Care or OBGYN    []  External Records Requested & Care Team Updated if Applicable       []  External Records Uploaded, Care Team Updated, & History Updated if Applicable    []  Patient Declined Scheduling Pap Smear    []  Patient Will Schedule with External Provider & Care Team Updated if Applicable                  Colorectal Cancer Screening []  Colonoscopy Case Request / Referral / Home Test Order Placed    []  External Records Requested & Care Team Updated if Applicable    []  External Records Uploaded, Care Team Updated, & History Updated if Applicable    []  Patient Declined Completing Colon Cancer Screening    []  Patient Will Schedule with External Provider & Care Team Updated if Applicable    []  Fit Kit Mailed (add the SmartPhrase under additional notes)    []  Reminded Patient to Complete Home Test                Diabetic Eye Exam []  Eye Exam Screening Order Placed    []  Eye Camera Scheduled or Optometry/Ophthalmology Referral  Placed    []  External Records Requested & Care Team Updated if Applicable    []  External Records Uploaded, Care Team Updated, & History Updated if Applicable    []  Patient Declined Scheduling Eye Exam    []  Patient Will Schedule with External Provider & Care Team Updated if Applicable             Blood Pressure Control []  Primary Care Follow Up Visit Scheduled     []  Remote Blood Pressure Reading Captured    []  Patient Declined Remote Reading or Scheduling Appt - Escalated to PCP    []  Patient Will Call Back or Send Portal Message with Reading                 HbA1c & Other Labs []  Overdue Lab(s) Ordered    []  Overdue Lab(s) Scheduled    []  External Records Uploaded & Care Team Updated if Applicable    []  Primary Care Follow Up Visit Scheduled     []  Reminded Patient to Complete A1c Home Test    []  Patient Declined Scheduling Labs or Will Call Back to Schedule    []  Patient Will Schedule with External Provider / Order Routed, & Care Team Updated if Applicable           Primary Care Appointment []  Primary Care Appt Scheduled    []  Patient Declined Scheduling or Will Call Back to Schedule    []  Pt Established with External Provider, Updated Care Team, & Informed Pt to Notify Payor if Applicable           Medication Adherence /    Statin Use []  Primary Care Appointment Scheduled    []  Patient Reminded to  Prescription    []  Patient Declined, Provider Notified if Needed    []  Sent Provider Message to Review to Evaluate Pt for Statin, Add Exclusion Dx Codes, Document   Exclusion in Problem List, Change Statin Intensity Level to Moderate or High Intensity if Applicable                Osteoporosis Screening []  Dexa Order Placed    []  Dexa Appointment Scheduled    []  External Records Requested & Care Team Updated    []  External Records Uploaded, Care Team Updated, & History Updated if Applicable    []  Patient Declined Scheduling Dexa or Will Call Back to Schedule    []  Patient Will Schedule  with External Provider / Order Routed & Care Team Updated if Applicable       Additional Notes:.  Post visit Population Health review of encounter with date of service  10/3/24 with Brigitte.  Not All required HY components in encounter.  Chart is opened  and needs primary dx as z00.00 or z00.01. Also needs CPT for age 59. Also needs an A1c added to labs message sent to lab by secure chat.    Followup appt for: 10/22/24 HY

## 2024-10-06 ENCOUNTER — PATIENT MESSAGE (OUTPATIENT)
Dept: FAMILY MEDICINE | Facility: CLINIC | Age: 59
End: 2024-10-06
Payer: COMMERCIAL

## 2024-10-06 RX ORDER — LEVOTHYROXINE SODIUM 88 UG/1
88 TABLET ORAL
Qty: 90 TABLET | Refills: 3 | Status: SHIPPED | OUTPATIENT
Start: 2024-10-06

## 2024-10-06 NOTE — ASSESSMENT & PLAN NOTE
Latest Reference Range & Units 03/20/23 18:03   Vitamin B12 193 - 986 pg/mL 269     On B12 1 ml IM every 4 wks.  Recheck level and advise accordingly.    Lab Results   Component Value Date    [id number] 624 10/03/2024     Continue current treatment plan.

## 2024-10-07 ENCOUNTER — OFFICE VISIT (OUTPATIENT)
Dept: ORTHOPEDICS | Facility: CLINIC | Age: 59
End: 2024-10-07
Payer: COMMERCIAL

## 2024-10-07 ENCOUNTER — TELEPHONE (OUTPATIENT)
Dept: FAMILY MEDICINE | Facility: CLINIC | Age: 59
End: 2024-10-07
Payer: COMMERCIAL

## 2024-10-07 DIAGNOSIS — M25.562 LEFT KNEE PAIN, UNSPECIFIED CHRONICITY: Primary | ICD-10-CM

## 2024-10-07 PROCEDURE — 99999 PR PBB SHADOW E&M-EST. PATIENT-LVL III: CPT | Mod: PBBFAC,,, | Performed by: ORTHOPAEDIC SURGERY

## 2024-10-07 NOTE — PROGRESS NOTES
CLINIC NOTE       Chief Complaint   Patient presents with    Left Knee - Follow-up        Muna Newberry is a 59 y.o. female seen today for viscosupplementation injection in the left knee.  Previously she had an intra-articular steroid injection with short-lived improvement.      Past Medical History:   Diagnosis Date    Herpes zoster without complication 12/20/2021    Hyperplastic rectal polyp 12/19/2016    Idiopathic thrombotic thrombocytopenic purpura     Osteoarthrosis     Osteoporosis     Personal history of colonic polyps 12/19/2016    PONV (postoperative nausea and vomiting)      Family History   Problem Relation Name Age of Onset    Heart disease Mother      Leukemia Father      Breast cancer Paternal Aunt       Current Outpatient Medications on File Prior to Visit   Medication Sig Dispense Refill    albuterol (VENTOLIN HFA) 90 mcg/actuation inhaler Inhale 2 puffs into the lungs every 6 (six) hours as needed for Wheezing. Rescue 18 g 5    baclofen (LIORESAL) 10 MG tablet Take 10 mg by mouth as needed.      cyanocobalamin 1,000 mcg/mL injection 1,000 mcg every 28 days.      HYDROcodone-acetaminophen (NORCO) 5-325 mg per tablet Take 0.5-1 tablets by mouth nightly as needed for Pain.      meloxicam (MOBIC) 15 MG tablet Take 1 tablet (15 mg total) by mouth once daily. 30 tablet 3    ondansetron (ZOFRAN) 4 MG tablet Take 1 tablet (4 mg total) by mouth every 6 (six) hours as needed for Nausea. 20 tablet 0    rimegepant (NURTEC) 75 mg odt Take 1 tablet (75 mg total) by mouth once as needed for Migraine. Place ODT tablet on the tongue; alternatively the ODT tablet may be placed under the tongue 16 tablet 2    SYNTHROID 88 mcg tablet Take 1 tablet (88 mcg total) by mouth before breakfast. Brand name only. 90 tablet 3    traMADoL (ULTRAM) 50 mg tablet Take 50 mg by mouth nightly. Take 1/2-1 tablet at bedtime daily prn.       No current facility-administered medications on file prior to visit.       ROS      There were no vitals filed for this visit.    Past Surgical History:   Procedure Laterality Date    APPENDECTOMY      ARTHROSCOPIC DEBRIDEMENT OF SHOULDER  3/12/2024    Procedure: DEBRIDEMENT, SHOULDER, ARTHROSCOPIC;  Surgeon: Salomon Sosa MD;  Location: [facility] OR;  Service: Orthopedics;;    ARTHROSCOPY OF SHOULDER WITH DECOMPRESSION OF SUBACROMIAL SPACE Left 3/12/2024    Procedure: ARTHROSCOPY, SHOULDER, WITH SUBACROMIAL SPACE DECOMPRESSION;  Surgeon: Salomon Sosa MD;  Location: [facility] OR;  Service: Orthopedics;  Laterality: Left;    BREAST BIOPSY      CHOLECYSTECTOMY      COLONOSCOPY  12/19/2016    Dr. Ed Adame [facility]    HYSTERECTOMY      OOPHORECTOMY      SHOULDER ARTHROSCOPY Left 3/12/2024    Procedure: ARTHROSCOPY, SHOULDER;  Surgeon: Salomon Sosa MD;  Location: [facility] OR;  Service: Orthopedics;  Laterality: Left;    SPINAL CORD STIMULATOR IMPLANT      TONSILLECTOMY          Review of patient's allergies indicates:   Allergen Reactions    Phenergan [promethazine]      IV phenergan only    Sulfa (sulfonamide antibiotics) Blisters    Opioids - morphine analogues Rash    Penicillins Rash        Ortho Exam : Left knee is normal contour.  No effusion.  Knee range motion 0-135 degrees of flexion.    Radiographic Examination:    Technique:    Findings:    Impression:   See Above    Assessment and Plan  Patient Active Problem List    Diagnosis Date Noted    Left knee pain 07/03/2024    Diverticulitis 04/23/2024    Left lower quadrant pain 04/23/2024    Shoulder impingement 02/19/2024    Traumatic incomplete tear of left rotator cuff 01/24/2024    Left shoulder pain 01/08/2024    Chronic pain of multiple joints 10/02/2023    Primary osteoarthritis involving multiple joints     Cobalamin deficiency     Occipital neuralgia of left side 06/01/2023    Restless legs syndrome 12/27/2022    Idiopathic chronic pancreatitis 12/27/2022    Iron deficiency  anemia 12/27/2022    Chronic migraine with aura 12/27/2022    Primary osteoarthritis of left hand 12/06/2021    Acquired hypothyroidism 04/21/2021    Osteoporosis 04/21/2021    Vitamin D deficiency 04/21/2021    Mild intermittent asthma without complication 04/21/2021    Chronic sinusitis 01/25/2021    Chronic pain 06/13/2020    Impression primary localized DJD-left knee   Plan: Left knee was prepped with Betadine intra-articular viscosupplementation injection with he was Synvisc 1 6 cc performed without difficulty.  She will be eligible for repeat injection 6 months or greater.    Salomon Sosa M.D.

## 2024-10-07 NOTE — TELEPHONE ENCOUNTER
----- Message from TOMER Duron sent at 10/7/2024  7:25 AM CDT -----  I sent her a message and told her that an A1c has to be added as required by her Healthy You and in the message I told her the only time we did not have lab coverage was Wednesday afternoon because I did not know [place] was leaving today.      Please touch base with patient so she does not show up this afternoon, since it is already and inconvenience that she is having to come back anyway.  The  that filled in does not draw extra blood so we did not have what we needed to add the A1c but I already explained this in my message to the patient.

## 2024-10-09 LAB
EST. AVERAGE GLUCOSE BLD GHB EST-MCNC: 94 MG/DL
HBA1C MFR BLD HPLC: 4.9 % (ref 4.5–6.6)

## 2024-10-09 PROCEDURE — 83036 HEMOGLOBIN GLYCOSYLATED A1C: CPT | Mod: ,,, | Performed by: CLINICAL MEDICAL LABORATORY

## 2024-11-03 ENCOUNTER — PATIENT MESSAGE (OUTPATIENT)
Dept: FAMILY MEDICINE | Facility: CLINIC | Age: 59
End: 2024-11-03
Payer: COMMERCIAL

## 2024-11-03 DIAGNOSIS — E53.8 COBALAMIN DEFICIENCY: Primary | Chronic | ICD-10-CM

## 2024-11-03 RX ORDER — CYANOCOBALAMIN 1000 UG/ML
1000 INJECTION, SOLUTION INTRAMUSCULAR; SUBCUTANEOUS
Qty: 10 ML | Refills: 1 | Status: SHIPPED | OUTPATIENT
Start: 2024-11-03

## 2024-11-06 ENCOUNTER — OFFICE VISIT (OUTPATIENT)
Dept: INTERNAL MEDICINE | Facility: CLINIC | Age: 59
End: 2024-11-06
Payer: COMMERCIAL

## 2024-11-06 VITALS
OXYGEN SATURATION: 97 % | RESPIRATION RATE: 18 BRPM | DIASTOLIC BLOOD PRESSURE: 72 MMHG | HEART RATE: 77 BPM | SYSTOLIC BLOOD PRESSURE: 115 MMHG | BODY MASS INDEX: 27.51 KG/M2 | HEIGHT: 61 IN

## 2024-11-06 DIAGNOSIS — M81.0 POSTMENOPAUSAL OSTEOPOROSIS: Primary | ICD-10-CM

## 2024-11-06 DIAGNOSIS — Z90.710 HISTORY OF HYSTERECTOMY: ICD-10-CM

## 2024-11-06 DIAGNOSIS — M81.0 OSTEOPOROSIS, UNSPECIFIED OSTEOPOROSIS TYPE, UNSPECIFIED PATHOLOGICAL FRACTURE PRESENCE: Chronic | ICD-10-CM

## 2024-11-06 DIAGNOSIS — E89.40 PREMATURE SURGICAL MENOPAUSE: ICD-10-CM

## 2024-11-06 DIAGNOSIS — Z86.39 HISTORY OF VITAMIN D DEFICIENCY: ICD-10-CM

## 2024-11-06 PROCEDURE — 3074F SYST BP LT 130 MM HG: CPT | Mod: S$GLB,,, | Performed by: NURSE PRACTITIONER

## 2024-11-06 PROCEDURE — 3008F BODY MASS INDEX DOCD: CPT | Mod: S$GLB,,, | Performed by: NURSE PRACTITIONER

## 2024-11-06 PROCEDURE — 3078F DIAST BP <80 MM HG: CPT | Mod: S$GLB,,, | Performed by: NURSE PRACTITIONER

## 2024-11-06 PROCEDURE — 1159F MED LIST DOCD IN RCRD: CPT | Mod: S$GLB,,, | Performed by: NURSE PRACTITIONER

## 2024-11-06 PROCEDURE — 3044F HG A1C LEVEL LT 7.0%: CPT | Mod: S$GLB,,, | Performed by: NURSE PRACTITIONER

## 2024-11-06 PROCEDURE — 99214 OFFICE O/P EST MOD 30 MIN: CPT | Mod: S$GLB,,, | Performed by: NURSE PRACTITIONER

## 2024-11-06 PROCEDURE — 1160F RVW MEDS BY RX/DR IN RCRD: CPT | Mod: S$GLB,,, | Performed by: NURSE PRACTITIONER

## 2024-11-06 PROCEDURE — 99999 PR PBB SHADOW E&M-EST. PATIENT-LVL V: CPT | Mod: PBBFAC,,, | Performed by: NURSE PRACTITIONER

## 2024-11-06 RX ORDER — ERGOCALCIFEROL 1.25 MG/1
50000 CAPSULE ORAL
COMMUNITY

## 2024-11-06 RX ORDER — ALENDRONATE SODIUM 70 MG/1
70 TABLET ORAL
Qty: 4 TABLET | Refills: 11 | Status: SHIPPED | OUTPATIENT
Start: 2024-11-06 | End: 2025-11-06

## 2024-11-06 NOTE — PROGRESS NOTES
HPI/CC  Ms. Muna Newberry is a 59 year old  female referred for evaluation and management of osteoporosis by Ms. Sharita Briggs, GRETCHENP, PCP.    Social history: Retired nurse  Medical history:  She is treated for hypothyroidism, migraine, diverticulitis, osteoarthritis; has history of vitamin-D deficiency.  History of ITP treated with steroids. Past treatment of osteoporosis with Boniva and Prolia.   Tolerated Boniva but had severe hip pain with Prolia  Surgical history:  History of hysterectomy at age 23 related to endometriosis    Lab results reviewed (ordered by PCP)  10/4/2024:   Vit D 41.5, Calcium 8.8, eGFR 104    DXA Scan  I reviewed images and report of DXA scan performed at [facility] on 07/24/2024.  T-scores   femoral neck -2.2, total hip -1.4, total spine -2.7   in range of osteoporosis  Calculated increase in BMD of 2.2% in total hip since 2016 and 3.9% in spine since 2016.   Artifact related to osteoarthritis may contribute to increases in calculated BMD    Fracture history       Personal    no history of   fractures in adulthood       Family        no known history of hip fracture in parents    Calcium and Vit D Takes multivitamin.  Takes ergocalciferol 50,000 units once a week, as prescribed by Ms. Briggs.  Long term history of Vit D deficiency  Dietary sources of calcium   Likes cheese and yogurt    Weight Bearing Exercise/ Mobility  Walks every day    Falls No recent falls    Dental Status  Wearing braces to help align jaw structures.  Has one cavity that will be filled next week    Risk factors for osteoporosis/fracture  Positive for  Postmenopausal  female with history of hysterectomy age 23  BMD T-scores in range of osteoporosis   History of treatment of ITP with steroids  Negative for   No fractures in adulthood   No known family history of hip fracture   Does not smoke cigarettes  Does not drink alcohol in excess    Review of Systems   Constitutional: Negative for fever,  chills, malaise.    Respiratory: Negative for cough and shortness of breath.    Cardiovascular: Negative for chest pain and palpitations.   Gastrointestinal: Negative for change in bowel habit, constipation   Neurological: Negative for dizziness, syncope, weakness and light-headedness.   Musculoskeletal:   Does not require assistive devices for ambulation    Physical Exam  Constitutional:       Appearance: In no distress   HENT:      Mouth/Throat:      Mouth: Mucous membranes are moist.      Dental:  Wearing braces.  Has one cavity; anticipates filling next week  Eyes:      Conjunctiva/sclera: Conjunctivae normal.   Cardiovascular:      Rate and Rhythm: Normal rate and regular rhythm.   Pulmonary:      Effort: Pulmonary effort is normal.   Musculoskeletal:      Cervical back: Normal range of motion.      Thoracic back: No kyphosis     Lumbar back: Normal range of motion. No scoliosis.      Gait: Normal  Skin:     General: Skin is warm and dry.   Neurological:      Mental Status: She is alert and oriented to person, place, and time.     Assessment:       1. Postmenopausal osteoporosis   2.     History of hysterectomy  3.     Premature surgical menopause  4.     History of vitamin-D deficiency   Plan:   I reviewed the images and report of DXA scan with pt.   Reviewed personal risk factors for osteoporosis  Offered overview of osteoporosis disease process and reviewed role of calcium, Vit D, and weight bearing exercise in bone health.  Reviewed body mechanics and falls precautions.    Reviewed role of anabolic and antiresorptive medications, including literature on risks and benefits of treatment vs non treatment with medications.    Lab reviewed: calcium, eGFR, and Vit D (normal)  RX Alendronate 70 mg orally once a week on same day of week.  Take first thing in morning with a full glass of water.  For next thirty minutes:  do not eat or drink,  7.   Return 3 months and PRN

## 2024-11-20 ENCOUNTER — CLINICAL SUPPORT (OUTPATIENT)
Dept: AUDIOLOGY | Facility: CLINIC | Age: 59
End: 2024-11-20
Payer: COMMERCIAL

## 2024-11-20 DIAGNOSIS — H90.3 SENSORY HEARING LOSS, BILATERAL: Primary | ICD-10-CM

## 2024-11-21 NOTE — PROGRESS NOTES
Patient was fit through [employer]  with [id number]-sphere aids [id number], [id number] with size 1 mod rec and small vented domes.

## 2024-11-25 ENCOUNTER — OFFICE VISIT (OUTPATIENT)
Dept: GASTROENTEROLOGY | Facility: CLINIC | Age: 59
End: 2024-11-25
Payer: COMMERCIAL

## 2024-11-25 VITALS
WEIGHT: 142.81 LBS | HEIGHT: 61 IN | OXYGEN SATURATION: 99 % | SYSTOLIC BLOOD PRESSURE: 127 MMHG | HEART RATE: 75 BPM | DIASTOLIC BLOOD PRESSURE: 55 MMHG | BODY MASS INDEX: 26.96 KG/M2

## 2024-11-25 DIAGNOSIS — K57.90 DIVERTICULOSIS: ICD-10-CM

## 2024-11-25 DIAGNOSIS — R10.32 LEFT LOWER QUADRANT PAIN: Primary | ICD-10-CM

## 2024-11-25 PROCEDURE — 99214 OFFICE O/P EST MOD 30 MIN: CPT | Mod: S$GLB,,,

## 2024-11-25 PROCEDURE — 3008F BODY MASS INDEX DOCD: CPT | Mod: S$GLB,,,

## 2024-11-25 PROCEDURE — 3074F SYST BP LT 130 MM HG: CPT | Mod: S$GLB,,,

## 2024-11-25 PROCEDURE — 3044F HG A1C LEVEL LT 7.0%: CPT | Mod: S$GLB,,,

## 2024-11-25 PROCEDURE — 3078F DIAST BP <80 MM HG: CPT | Mod: S$GLB,,,

## 2024-11-25 PROCEDURE — 1159F MED LIST DOCD IN RCRD: CPT | Mod: S$GLB,,,

## 2024-11-25 PROCEDURE — 1160F RVW MEDS BY RX/DR IN RCRD: CPT | Mod: S$GLB,,,

## 2024-11-25 PROCEDURE — 99999 PR PBB SHADOW E&M-EST. PATIENT-LVL IV: CPT | Mod: PBBFAC,,,

## 2024-11-25 RX ORDER — POLYETHYLENE GLYCOL 3350, SODIUM SULFATE ANHYDROUS, SODIUM BICARBONATE, SODIUM CHLORIDE, POTASSIUM CHLORIDE 236; 22.74; 6.74; 5.86; 2.97 G/4L; G/4L; G/4L; G/4L; G/4L
4 POWDER, FOR SOLUTION ORAL ONCE
Qty: 4000 ML | Refills: 0 | Status: SHIPPED | OUTPATIENT
Start: 2024-11-25 | End: 2024-11-25

## 2024-11-25 NOTE — PROGRESS NOTES
CC:  Left lower quadrant abdominal pain    HPI 59 y.o. white female presents today for reschedule of her colonoscopy.  Patient states she was seen ended up having to a shoulder surgery and when is unable to make her appointment for colonoscopy.  Patient continues to left lower quadrant pain she does have a history of diverticulosis.  Labs reviewed no anemia or elevated liver functions  Interval  HPI 58 y.o. white female presents today with left lower quadrant pain.  She states yesterday when she got home from work she was hurting so badly that she took some nausea medicine and went to bed by 6:00 p.m. She said the pain was so severe when she walked she had to hold her side.  She does state that her stools are pencil like but soft.  She did have a low-grade temp of 99.9° last night.  She does feel some better today however she is experiencing some lower left quadrant pain still and is guarding left side.  Patient did bring previous 7/19/22 CT from [facility] from few years ago it did show that she had some diverticula present in the sigmoid colon.  Her previous colonoscopy from 12/2016 at [facility] did not show any diverticulosis it only showed 1 rectal polyp at that time.  She was told to have another scope in 10 years.  The polyp from the rectum was hyperplastic.  We will schedule for colonoscopy here.  Patient does have a history of chronic pancreatitis, she has never drank alcohol, she has had 2 surgeries for her pancreas.  She is very particular about what she eats, she exercises daily and drinks lots of water daily.  Labs cbc shows no anemia, or elevated liver enzymes  Medical records reviewed. Additional history supplemented by nursing.     Past Medical History:   Diagnosis Date    Herpes zoster without complication 12/20/2021    Hyperplastic rectal polyp 12/19/2016    Idiopathic thrombotic thrombocytopenic purpura     Osteoarthrosis     Osteoporosis     Personal history of colonic polyps 12/19/2016     "PONV (postoperative nausea and vomiting)          Review of Systems  General ROS: negative for chills, fever or weight loss  Cardiovascular ROS: no chest pain or dyspnea on exertion  Gastrointestinal ROS: no abdominal pain, change in bowel habits, or black/ bloody stools    Physical Examination  BP (!) 127/55   Pulse 75   Ht 5' 1" (1.549 m)   Wt 64.8 kg (142 lb 12.8 oz)   SpO2 99%   BMI 26.98 kg/m²   General appearance: alert, cooperative, no distress  HENT: Normocephalic, atraumatic, neck symmetrical, no nasal discharge   Lungs: no labored breathing, symmetric chest wall expansion bilaterally  Heart: regular rate and rhythm without rub; no displacement of the PMI   Abdomen: soft, non-tender; bowel sounds normoactive; no organomegaly    Labs:  Lab Results   Component Value Date    WBC 6.19 03/07/2024    HGB 13.5 03/07/2024    HCT 41.9 03/07/2024    MCV 91.5 03/07/2024     03/07/2024       CMP  Sodium   Date Value Ref Range Status   10/03/2024 139 136 - 145 mmol/L Final     Potassium   Date Value Ref Range Status   10/03/2024 4.1 3.5 - 5.1 mmol/L Final     Chloride   Date Value Ref Range Status   10/03/2024 108 (H) 98 - 107 mmol/L Final     CO2   Date Value Ref Range Status   10/03/2024 27 21 - 32 mmol/L Final     Glucose   Date Value Ref Range Status   10/03/2024 79 74 - 106 mg/dL Final     BUN   Date Value Ref Range Status   10/03/2024 8 7 - 18 mg/dL Final     Creatinine   Date Value Ref Range Status   10/03/2024 0.58 0.55 - 1.02 mg/dL Final     Calcium   Date Value Ref Range Status   10/03/2024 8.8 8.5 - 10.1 mg/dL Final     Total Protein   Date Value Ref Range Status   10/03/2024 6.7 6.4 - 8.2 g/dL Final     Albumin   Date Value Ref Range Status   10/03/2024 3.7 3.5 - 5.0 g/dL Final     Bilirubin, Total   Date Value Ref Range Status   10/03/2024 0.4 >0.0 - 1.2 mg/dL Final     Alk Phos   Date Value Ref Range Status   10/03/2024 57 50 - 130 U/L Final     AST   Date Value Ref Range Status   10/03/2024 15 " 15 - 37 U/L Final     ALT   Date Value Ref Range Status   10/03/2024 22 13 - 56 U/L Final     Anion Gap   Date Value Ref Range Status   10/03/2024 8 7 - 16 mmol/L Final     eGFR   Date Value Ref Range Status   06/16/2022 95 >=60 mL/min/1.73m² Final       Imaging:  CT ABDOMEN PELVIS WITH IV CONTRAST     CLINICAL HISTORY:  Left lower quadrant painLLQ abdominal pain;possible diverticulitis;     COMPARISON:  CT abdomen pelvis August 14, 2019     TECHNIQUE:  Multiple axial tomographic images of the abdomen and pelvis were obtained after administration of 100 cc Isovue 370 intravenous contrast.     FINDINGS:  Mild dependent changes of the lungs noted.     No worrisome focal hepatic abnormality demonstrated on submitted images.  Prior cholecystectomy.  Visualized pancreas appears unremarkable.  Spleen grossly unremarkable.     Bilateral adrenal glands grossly unremarkable.  Bilateral kidneys appear grossly unremarkable.  Urinary bladder incompletely distended.  Prior hysterectomy.     No convincing evidence of gastrointestinal obstruction or acute appendicitis.  Atherosclerotic calcification demonstrated.  Visualized osseous and surrounding soft tissue structures demonstrate no acute abnormality.  Neurostimulator device implanted within the right lower back similar to prior with electrodes extending superiorly into the dorsal thoracic spinal canal to, partially visualized.     Impression:     No acute CT findings.  Other/detailed findings as above.     The CT exam was performed using one or more of the following dose     reduction techniques- Automated exposure control, adjustment of the mA     and/or kV according to patient size, and/or use of iterative     reconstructed technique.     Point of Service: [facility]        Electronically signed by:Jesus Alanis  Date:                                            05/09/2024    Independently reviewed    Assessment:  Muna Newberry 60 yo AAF here with:  Left lower  quadrant pain  History of diverticulosis    Plan:  Patient was here to follow-up to reschedule her colonoscopy that she had to cancel because of a shoulder surgery.  Patient continues to have left lower quadrant pain and does have a history of diverticulosis  Follow-up as needed    30 minutes of total time spent on the encounter, which includes face to face time and non-face to face time preparing to see the patient (eg, review of tests), obtaining and/or reviewing separately obtained history, documenting clinical information in the electronic or other health record, Independently interpreting results (not separately reported) and communicating results to the patient/family/caregiver, or care coordination (not separately reported).         Hilda Hirsch, FNP [facility]

## 2024-11-25 NOTE — PATIENT INSTRUCTIONS
I recommend a bowel cleanse with a gatorade miralax prep: take 20 mg (4) of dulcolax orally and then mix 238 grams (14 capfuls) of miralax in 64 oz of your favorite zero sugar gatorade and drink over a 4 hour period on a day when you will be at home    -After your bowel cleanse, I recommend starting a daily fiber supplement with Citrucel or Fibercon Metamucil  (can purchase at your local pharmacy)    -I recommend that you also use Miralax 17 grams daily-to-twice daily as needed to have a regular, soft BM without straining - you can adjust how often you need miralax, but start with daily dosing and go from there    -I recommend drinking at least 60-80 ounces of water daily unless you have a medical condition that requires fluid restriction

## 2024-12-17 ENCOUNTER — CLINICAL SUPPORT (OUTPATIENT)
Dept: AUDIOLOGY | Facility: CLINIC | Age: 59
End: 2024-12-17
Payer: COMMERCIAL

## 2024-12-17 DIAGNOSIS — H90.3 SENSORY HEARING LOSS, BILATERAL: Primary | ICD-10-CM

## 2024-12-17 NOTE — PROGRESS NOTES
Patient has infection that is causing her left ear to feel stopped up. Will follow up once infection is cleared.

## 2024-12-23 ENCOUNTER — TELEPHONE (OUTPATIENT)
Dept: ORTHOPEDICS | Facility: CLINIC | Age: 59
End: 2024-12-23
Payer: COMMERCIAL

## 2024-12-23 NOTE — TELEPHONE ENCOUNTER
Talked with patient about concerns. Let her know it sounds like she's doing pretty much everything she can for the pain. Let her know we need to get her back in with Dr. Sosa instead of JONNATHAN Bragg incase she is requiring surgery. Patient verbalized understanding and is thankful.

## 2024-12-23 NOTE — TELEPHONE ENCOUNTER
----- Message from Jannette sent at 12/20/2024  2:22 PM CST -----  Regarding: constant pain  Who Called: Muna Newberry    Caller is requesting assistance/information from provider's office.    Having constant pain in LT Knee. Scheduled for 01/07/2025 but wants to know what to do in the mean time.      Preferred Method of Contact: Phone Call  Patient's Preferred Phone Number on File: [phone]   Best Call Back Number, if different:  Additional Information:

## 2025-01-02 DIAGNOSIS — M25.562 LEFT KNEE PAIN, UNSPECIFIED CHRONICITY: Primary | ICD-10-CM

## 2025-01-06 ENCOUNTER — OFFICE VISIT (OUTPATIENT)
Dept: ORTHOPEDICS | Facility: CLINIC | Age: 60
End: 2025-01-06
Payer: COMMERCIAL

## 2025-01-06 ENCOUNTER — HOSPITAL ENCOUNTER (OUTPATIENT)
Dept: RADIOLOGY | Facility: HOSPITAL | Age: 60
Discharge: HOME | End: 2025-01-06
Attending: ORTHOPAEDIC SURGERY
Payer: COMMERCIAL

## 2025-01-06 DIAGNOSIS — M25.562 LEFT KNEE PAIN, UNSPECIFIED CHRONICITY: ICD-10-CM

## 2025-01-06 DIAGNOSIS — M17.12 PRIMARY OSTEOARTHRITIS OF LEFT KNEE: Primary | ICD-10-CM

## 2025-01-06 PROCEDURE — 73562 X-RAY EXAM OF KNEE 3: CPT | Mod: TC,LT

## 2025-01-06 PROCEDURE — 73562 X-RAY EXAM OF KNEE 3: CPT | Mod: 26,LT,, | Performed by: ORTHOPAEDIC SURGERY

## 2025-01-06 PROCEDURE — 1159F MED LIST DOCD IN RCRD: CPT | Mod: S$GLB,,, | Performed by: ORTHOPAEDIC SURGERY

## 2025-01-06 PROCEDURE — 1160F RVW MEDS BY RX/DR IN RCRD: CPT | Mod: S$GLB,,, | Performed by: ORTHOPAEDIC SURGERY

## 2025-01-06 PROCEDURE — 99214 OFFICE O/P EST MOD 30 MIN: CPT | Mod: S$GLB,,, | Performed by: ORTHOPAEDIC SURGERY

## 2025-01-06 PROCEDURE — 99999 PR PBB SHADOW E&M-EST. PATIENT-LVL III: CPT | Mod: PBBFAC,,, | Performed by: ORTHOPAEDIC SURGERY

## 2025-01-06 NOTE — PROGRESS NOTES
CLINIC NOTE       Chief Complaint   Patient presents with    Left Knee - Pain        Muna Newberry is a 59 y.o. female seen today for recheck of her left knee.  She is experiencing escalating symptoms despite conservative management with anti-inflammatory medications (Mobic), corticosteroid injection and viscosupplementation injection.  She underwent MRI examination of the left knee 06/25/2024.  The MRI showed cystic degeneration of the anterior cruciate ligament along the femoral and tibial attachments with some partial-thickness tearing.  The menisci were intact at that juncture.  She does feel pain in the popliteal space region with prolonged standing and fullness.  She potentially has Baker cyst.  Clinically there is no significant effusion and knee ligaments are stable clinically.  I have suggested an update MRI examination at this juncture.    Past Medical History:   Diagnosis Date    Herpes zoster without complication 12/20/2021    Hyperplastic rectal polyp 12/19/2016    Idiopathic thrombotic thrombocytopenic purpura     Osteoarthrosis     Osteoporosis     Personal history of colonic polyps 12/19/2016    PONV (postoperative nausea and vomiting)      Family History   Problem Relation Name Age of Onset    Heart disease Mother      Leukemia Father      Breast cancer Paternal Aunt       Current Outpatient Medications on File Prior to Visit   Medication Sig Dispense Refill    albuterol (VENTOLIN HFA) 90 mcg/actuation inhaler Inhale 2 puffs into the lungs every 6 (six) hours as needed for Wheezing. Rescue 18 g 5    alendronate (FOSAMAX) 70 MG tablet Take 1 tablet (70 mg total) by mouth every 7 days. Take orally once a week on same day of week.  Take first thing in am with a glass of water.  For next 30 minutes, do not eat, drink, take medications or supplements, recline or lie down. 4 tablet 11    baclofen (LIORESAL) 10 MG tablet Take 10 mg by mouth as needed.      cyanocobalamin 1,000 mcg/mL injection  Inject 1 mL (1,000 mcg total) into the muscle every 30 days. Include needles and syringes for administration. 10 mL 1    ergocalciferol (VITAMIN D2) 50,000 unit Cap Take 50,000 Units by mouth every 7 days. Once weekly      HYDROcodone-acetaminophen (NORCO) 5-325 mg per tablet Take 0.5-1 tablets by mouth nightly as needed for Pain.      meloxicam (MOBIC) 15 MG tablet Take 1 tablet (15 mg total) by mouth once daily. 30 tablet 3    ondansetron (ZOFRAN) 4 MG tablet Take 1 tablet (4 mg total) by mouth every 6 (six) hours as needed for Nausea. 20 tablet 0    rimegepant (NURTEC) 75 mg odt Take 1 tablet (75 mg total) by mouth once as needed for Migraine. Place ODT tablet on the tongue; alternatively the ODT tablet may be placed under the tongue 16 tablet 2    SYNTHROID 88 mcg tablet Take 1 tablet (88 mcg total) by mouth before breakfast. Brand name only. 90 tablet 3    traMADoL (ULTRAM) 50 mg tablet Take 50 mg by mouth nightly. Take 1/2-1 tablet at bedtime daily prn.       No current facility-administered medications on file prior to visit.       ROS     There were no vitals filed for this visit.    Past Surgical History:   Procedure Laterality Date    APPENDECTOMY      ARTHROSCOPIC DEBRIDEMENT OF SHOULDER  3/12/2024    Procedure: DEBRIDEMENT, SHOULDER, ARTHROSCOPIC;  Surgeon: Salomon Sosa MD;  Location: [facility];  Service: Orthopedics;;    ARTHROSCOPY OF SHOULDER WITH DECOMPRESSION OF SUBACROMIAL SPACE Left 3/12/2024    Procedure: ARTHROSCOPY, SHOULDER, WITH SUBACROMIAL SPACE DECOMPRESSION;  Surgeon: Salomon Sosa MD;  Location: [facility] OR;  Service: Orthopedics;  Laterality: Left;    BREAST BIOPSY      CHOLECYSTECTOMY      COLONOSCOPY  12/19/2016    Dr. Ed [facility]    HYSTERECTOMY      OOPHORECTOMY      SHOULDER ARTHROSCOPY Left 3/12/2024    Procedure: ARTHROSCOPY, SHOULDER;  Surgeon: Salomon Sosa MD;  Location: [facility];  Service: Orthopedics;   Laterality: Left;    SPINAL CORD STIMULATOR IMPLANT      TONSILLECTOMY          Review of patient's allergies indicates:   Allergen Reactions    Phenergan [promethazine]      IV phenergan only    Sulfa (sulfonamide antibiotics) Blisters    Opioids - morphine analogues Rash    Penicillins Rash        Ortho Exam     Radiographic Examination:  Left knee 01/06/2025    Technique:  Three views AP lateral and patellar  Findings:  Bones well mineralized.  Minimal joint space narrowing laterally.  No evidence of fracture, dislocation or pathologic bone.    Impression:   See Above    Assessment and Plan  Patient Active Problem List    Diagnosis Date Noted    Left knee pain 07/03/2024    Diverticulitis 04/23/2024    Left lower quadrant pain 04/23/2024    Shoulder impingement 02/19/2024    Traumatic incomplete tear of left rotator cuff 01/24/2024    Left shoulder pain 01/08/2024    Chronic pain of multiple joints 10/02/2023    Primary osteoarthritis involving multiple joints     Cobalamin deficiency     Occipital neuralgia of left side 06/01/2023    Restless legs syndrome 12/27/2022    Idiopathic chronic pancreatitis 12/27/2022    Iron deficiency anemia 12/27/2022    Chronic migraine with aura 12/27/2022    Primary osteoarthritis of left hand 12/06/2021    Acquired hypothyroidism 04/21/2021    Osteoporosis 04/21/2021    Vitamin D deficiency 04/21/2021    Mild intermittent asthma without complication 04/21/2021    Chronic sinusitis 01/25/2021    Chronic pain 06/13/2020          Salomon Sosa M.D.

## 2025-01-07 ENCOUNTER — TELEPHONE (OUTPATIENT)
Dept: ORTHOPEDICS | Facility: CLINIC | Age: 60
End: 2025-01-07
Payer: COMMERCIAL

## 2025-01-07 NOTE — TELEPHONE ENCOUNTER
Talked with patient and let her know MRI is scheduled for the 28th @ 4pm. Let her know to bring her implant card with her to appointment. Patient verbalized understanding and was thankful.

## 2025-01-14 ENCOUNTER — PATIENT MESSAGE (OUTPATIENT)
Dept: GASTROENTEROLOGY | Facility: CLINIC | Age: 60
End: 2025-01-14
Payer: COMMERCIAL

## 2025-01-14 ENCOUNTER — TELEPHONE (OUTPATIENT)
Dept: GASTROENTEROLOGY | Facility: CLINIC | Age: 60
End: 2025-01-14
Payer: COMMERCIAL

## 2025-01-14 NOTE — TELEPHONE ENCOUNTER
Spoke w/ pt in regards to request for change of colonoscopy date - pt advised with change it may push out procedure - new appt date given of 4/14/25 at 1200 - instructions reviewed and to be mailed and uploaded to pt [employer]

## 2025-01-29 ENCOUNTER — TELEPHONE (OUTPATIENT)
Dept: ORTHOPEDICS | Facility: CLINIC | Age: 60
End: 2025-01-29
Payer: COMMERCIAL

## 2025-01-29 ENCOUNTER — CLINICAL SUPPORT (OUTPATIENT)
Dept: AUDIOLOGY | Facility: CLINIC | Age: 60
End: 2025-01-29
Payer: COMMERCIAL

## 2025-01-29 DIAGNOSIS — H90.3 SENSORY HEARING LOSS, BILATERAL: Primary | ICD-10-CM

## 2025-01-29 NOTE — TELEPHONE ENCOUNTER
----- Message from Kina sent at 1/29/2025  4:16 PM CST -----  Regarding: appt request  Name of Who is Calling: Lucy           What is the request in detail: Patient is requesting a call back to schedule MRI follow up appt 1/30-2/6.           Can the clinic reply by [facility]: No           What Number to Call Back if not in [facility]: [phone]

## 2025-01-29 NOTE — PROGRESS NOTES
Patient needs more time adjusting to the new technology. We decreased gain to 90% and she felt that helped. We will follow up as needed.

## 2025-02-03 ENCOUNTER — OFFICE VISIT (OUTPATIENT)
Dept: ORTHOPEDICS | Facility: CLINIC | Age: 60
End: 2025-02-03
Payer: COMMERCIAL

## 2025-02-03 DIAGNOSIS — S83.282A ACUTE LATERAL MENISCUS TEAR OF LEFT KNEE, INITIAL ENCOUNTER: Primary | ICD-10-CM

## 2025-02-03 PROCEDURE — 99999 PR PBB SHADOW E&M-EST. PATIENT-LVL III: CPT | Mod: PBBFAC,,, | Performed by: ORTHOPAEDIC SURGERY

## 2025-02-03 PROCEDURE — 1159F MED LIST DOCD IN RCRD: CPT | Mod: S$GLB,,, | Performed by: ORTHOPAEDIC SURGERY

## 2025-02-03 PROCEDURE — 1160F RVW MEDS BY RX/DR IN RCRD: CPT | Mod: S$GLB,,, | Performed by: ORTHOPAEDIC SURGERY

## 2025-02-03 PROCEDURE — 99214 OFFICE O/P EST MOD 30 MIN: CPT | Mod: S$GLB,,, | Performed by: ORTHOPAEDIC SURGERY

## 2025-02-03 NOTE — PROGRESS NOTES
CLINIC NOTE       Chief Complaint   Patient presents with    Left Knee - Follow-up        Muna Newberry is a 59 y.o. female seen today for recheck of her left knee.  She underwent MRI examination on 01/29/2025.  The MRI indicates possible ganglion cyst and mucinous degeneration of the ACL.  There is however evidence of a horizontal tear of the anterior horn of the lateral meniscus with a flap component.      Past Medical History:   Diagnosis Date    Herpes zoster without complication 12/20/2021    Hyperplastic rectal polyp 12/19/2016    Idiopathic thrombotic thrombocytopenic purpura     Osteoarthrosis     Osteoporosis     Personal history of colonic polyps 12/19/2016    PONV (postoperative nausea and vomiting)      Family History   Problem Relation Name Age of Onset    Heart disease Mother      Leukemia Father      Breast cancer Paternal Aunt       Current Outpatient Medications on File Prior to Visit   Medication Sig Dispense Refill    albuterol (VENTOLIN HFA) 90 mcg/actuation inhaler Inhale 2 puffs into the lungs every 6 (six) hours as needed for Wheezing. Rescue 18 g 5    alendronate (FOSAMAX) 70 MG tablet Take 1 tablet (70 mg total) by mouth every 7 days. Take orally once a week on same day of week.  Take first thing in am with a glass of water.  For next 30 minutes, do not eat, drink, take medications or supplements, recline or lie down. 4 tablet 11    baclofen (LIORESAL) 10 MG tablet Take 10 mg by mouth as needed.      cyanocobalamin 1,000 mcg/mL injection Inject 1 mL (1,000 mcg total) into the muscle every 30 days. Include needles and syringes for administration. 10 mL 1    ergocalciferol (VITAMIN D2) 50,000 unit Cap Take 50,000 Units by mouth every 7 days. Once weekly      HYDROcodone-acetaminophen (NORCO) 5-325 mg per tablet Take 0.5-1 tablets by mouth nightly as needed for Pain.      meloxicam (MOBIC) 15 MG tablet Take 1 tablet (15 mg total) by mouth once daily. 30 tablet 3    ondansetron (ZOFRAN)  4 MG tablet Take 1 tablet (4 mg total) by mouth every 6 (six) hours as needed for Nausea. 20 tablet 0    rimegepant (NURTEC) 75 mg odt Take 1 tablet (75 mg total) by mouth once as needed for Migraine. Place ODT tablet on the tongue; alternatively the ODT tablet may be placed under the tongue 16 tablet 2    SYNTHROID 88 mcg tablet Take 1 tablet (88 mcg total) by mouth before breakfast. Brand name only. 90 tablet 3    traMADoL (ULTRAM) 50 mg tablet Take 50 mg by mouth nightly. Take 1/2-1 tablet at bedtime daily prn.       No current facility-administered medications on file prior to visit.       ROS     There were no vitals filed for this visit.    Past Surgical History:   Procedure Laterality Date    APPENDECTOMY      ARTHROSCOPIC DEBRIDEMENT OF SHOULDER  3/12/2024    Procedure: DEBRIDEMENT, SHOULDER, ARTHROSCOPIC;  Surgeon: Salomon Sosa MD;  Location: [facility] OR;  Service: Orthopedics;;    ARTHROSCOPY OF SHOULDER WITH DECOMPRESSION OF SUBACROMIAL SPACE Left 3/12/2024    Procedure: ARTHROSCOPY, SHOULDER, WITH SUBACROMIAL SPACE DECOMPRESSION;  Surgeon: Salomon Sosa MD;  Location: [facility] OR;  Service: Orthopedics;  Laterality: Left;    BREAST BIOPSY      CHOLECYSTECTOMY      COLONOSCOPY  12/19/2016    Dr. Ed [facility]ame [facility] - [facility]    HYSTERECTOMY      OOPHORECTOMY      SHOULDER ARTHROSCOPY Left 3/12/2024    Procedure: ARTHROSCOPY, SHOULDER;  Surgeon: Salomon Sosa MD;  Location: [facility] OR;  Service: Orthopedics;  Laterality: Left;    SPINAL CORD STIMULATOR IMPLANT      TONSILLECTOMY          Review of patient's allergies indicates:   Allergen Reactions    Phenergan [promethazine]      IV phenergan only    Sulfa (sulfonamide antibiotics) Blisters    Opioids - morphine analogues Rash    Penicillins Rash        Ortho Exam     Radiographic Examination:    Technique:    Findings:    Impression:   See Above    Assessment and Plan  Patient Active Problem List    Diagnosis  Date Noted    Left knee pain 07/03/2024    Diverticulitis 04/23/2024    Left lower quadrant pain 04/23/2024    Shoulder impingement 02/19/2024    Traumatic incomplete tear of left rotator cuff 01/24/2024    Left shoulder pain 01/08/2024    Chronic pain of multiple joints 10/02/2023    Primary osteoarthritis involving multiple joints     Cobalamin deficiency     Occipital neuralgia of left side 06/01/2023    Restless legs syndrome 12/27/2022    Idiopathic chronic pancreatitis 12/27/2022    Iron deficiency anemia 12/27/2022    Chronic migraine with aura 12/27/2022    Primary osteoarthritis of left hand 12/06/2021    Acquired hypothyroidism 04/21/2021    Osteoporosis 04/21/2021    Vitamin D deficiency 04/21/2021    Mild intermittent asthma without complication 04/21/2021    Chronic sinusitis 01/25/2021    Chronic pain 06/13/2020    Impression: Internal derangement-left knee  Plan: Patient is offered left knee arthroscopy.  Potential benefits risks surgery outlined to include but not limited to bleeding, infection, damage to blood vessels and nerves, need for further surgery, other risks and complications including even death the patient wished to proceed.  Discussed outpatient/general anesthesia.    Salomon Sosa M.D.

## 2025-02-09 ENCOUNTER — PATIENT MESSAGE (OUTPATIENT)
Dept: FAMILY MEDICINE | Facility: CLINIC | Age: 60
End: 2025-02-09
Payer: COMMERCIAL

## 2025-02-12 ENCOUNTER — OFFICE VISIT (OUTPATIENT)
Dept: FAMILY MEDICINE | Facility: CLINIC | Age: 60
End: 2025-02-12
Payer: COMMERCIAL

## 2025-02-12 VITALS
TEMPERATURE: 98 F | HEIGHT: 61 IN | BODY MASS INDEX: 27.56 KG/M2 | HEART RATE: 80 BPM | OXYGEN SATURATION: 97 % | DIASTOLIC BLOOD PRESSURE: 84 MMHG | SYSTOLIC BLOOD PRESSURE: 133 MMHG | WEIGHT: 146 LBS | RESPIRATION RATE: 18 BRPM

## 2025-02-12 DIAGNOSIS — M79.661 PAIN OF RIGHT CALF: Primary | ICD-10-CM

## 2025-02-12 DIAGNOSIS — H65.02 NON-RECURRENT ACUTE SEROUS OTITIS MEDIA OF LEFT EAR: ICD-10-CM

## 2025-02-12 DIAGNOSIS — J01.00 ACUTE NON-RECURRENT MAXILLARY SINUSITIS: ICD-10-CM

## 2025-02-12 LAB
CTP QC/QA: YES
MOLECULAR STREP A: NEGATIVE
POC MOLECULAR INFLUENZA A AGN: NEGATIVE
POC MOLECULAR INFLUENZA B AGN: NEGATIVE
SARS-COV-2 RDRP RESP QL NAA+PROBE: NEGATIVE

## 2025-02-12 PROCEDURE — 3079F DIAST BP 80-89 MM HG: CPT | Mod: ,,, | Performed by: NURSE PRACTITIONER

## 2025-02-12 PROCEDURE — 99214 OFFICE O/P EST MOD 30 MIN: CPT | Mod: ,,, | Performed by: NURSE PRACTITIONER

## 2025-02-12 PROCEDURE — 87635 SARS-COV-2 COVID-19 AMP PRB: CPT | Mod: QW,,, | Performed by: NURSE PRACTITIONER

## 2025-02-12 PROCEDURE — 1159F MED LIST DOCD IN RCRD: CPT | Mod: ,,, | Performed by: NURSE PRACTITIONER

## 2025-02-12 PROCEDURE — 87502 INFLUENZA DNA AMP PROBE: CPT | Mod: QW,,, | Performed by: NURSE PRACTITIONER

## 2025-02-12 PROCEDURE — 3008F BODY MASS INDEX DOCD: CPT | Mod: ,,, | Performed by: NURSE PRACTITIONER

## 2025-02-12 PROCEDURE — 3075F SYST BP GE 130 - 139MM HG: CPT | Mod: ,,, | Performed by: NURSE PRACTITIONER

## 2025-02-12 PROCEDURE — 87651 STREP A DNA AMP PROBE: CPT | Mod: QW,,, | Performed by: NURSE PRACTITIONER

## 2025-02-12 PROCEDURE — 1160F RVW MEDS BY RX/DR IN RCRD: CPT | Mod: ,,, | Performed by: NURSE PRACTITIONER

## 2025-02-12 RX ORDER — CEFDINIR 300 MG/1
300 CAPSULE ORAL 2 TIMES DAILY
Qty: 20 CAPSULE | Refills: 0 | Status: SHIPPED | OUTPATIENT
Start: 2025-02-12 | End: 2025-02-22

## 2025-02-12 RX ORDER — ONDANSETRON 4 MG/1
4 TABLET, FILM COATED ORAL EVERY 6 HOURS PRN
Qty: 30 TABLET | Refills: 0 | Status: SHIPPED | OUTPATIENT
Start: 2025-02-12
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
## 2025-02-20 ENCOUNTER — HOSPITAL ENCOUNTER (OUTPATIENT)
Dept: RADIOLOGY | Facility: HOSPITAL | Age: 60
Discharge: HOME | End: 2025-02-20
Attending: NURSE PRACTITIONER
Payer: COMMERCIAL

## 2025-02-20 DIAGNOSIS — M79.661 PAIN OF RIGHT CALF: ICD-10-CM

## 2025-02-20 PROCEDURE — 93971 EXTREMITY STUDY: CPT | Mod: TC,RT

## 2025-02-23 ENCOUNTER — RESULTS FOLLOW-UP (OUTPATIENT)
Dept: FAMILY MEDICINE | Facility: CLINIC | Age: 60
End: 2025-02-23

## 2025-03-03 DIAGNOSIS — Z01.818 ENCOUNTER FOR OTHER PREPROCEDURAL EXAMINATION: Primary | ICD-10-CM

## 2025-03-06 ENCOUNTER — ANESTHESIA (OUTPATIENT)
Dept: SURGERY | Facility: HOSPITAL | Age: 60
End: 2025-03-06
Payer: COMMERCIAL

## 2025-03-06 ENCOUNTER — ANESTHESIA EVENT (OUTPATIENT)
Dept: SURGERY | Facility: HOSPITAL | Age: 60
End: 2025-03-06
Payer: COMMERCIAL

## 2025-03-06 ENCOUNTER — HOSPITAL ENCOUNTER (OUTPATIENT)
Facility: HOSPITAL | Age: 60
Discharge: HOME | End: 2025-03-06
Attending: ORTHOPAEDIC SURGERY | Admitting: ORTHOPAEDIC SURGERY
Payer: COMMERCIAL

## 2025-03-06 VITALS
DIASTOLIC BLOOD PRESSURE: 52 MMHG | TEMPERATURE: 98 F | WEIGHT: 145 LBS | HEIGHT: 61 IN | HEART RATE: 74 BPM | SYSTOLIC BLOOD PRESSURE: 107 MMHG | RESPIRATION RATE: 16 BRPM | BODY MASS INDEX: 27.38 KG/M2 | OXYGEN SATURATION: 97 %

## 2025-03-06 VITALS
SYSTOLIC BLOOD PRESSURE: 98 MMHG | RESPIRATION RATE: 11 BRPM | OXYGEN SATURATION: 100 % | DIASTOLIC BLOOD PRESSURE: 40 MMHG | HEART RATE: 97 BPM

## 2025-03-06 DIAGNOSIS — S83.282A ACUTE LATERAL MENISCUS TEAR OF LEFT KNEE: Primary | ICD-10-CM

## 2025-03-06 PROCEDURE — 25000003 PHARM REV CODE 250: Performed by: NURSE ANESTHETIST, CERTIFIED REGISTERED

## 2025-03-06 PROCEDURE — 97161 PT EVAL LOW COMPLEX 20 MIN: CPT

## 2025-03-06 PROCEDURE — 71000016 HC POSTOP RECOV ADDL HR: Performed by: ORTHOPAEDIC SURGERY

## 2025-03-06 PROCEDURE — 71000039 HC RECOVERY, EACH ADD'L HOUR: Performed by: ORTHOPAEDIC SURGERY

## 2025-03-06 PROCEDURE — 71000015 HC POSTOP RECOV 1ST HR: Performed by: ORTHOPAEDIC SURGERY

## 2025-03-06 PROCEDURE — 37000009 HC ANESTHESIA EA ADD 15 MINS: Performed by: ORTHOPAEDIC SURGERY

## 2025-03-06 PROCEDURE — 27000177 HC AIRWAY, LARYNGEAL MASK: Performed by: ANESTHESIOLOGY

## 2025-03-06 PROCEDURE — 37000008 HC ANESTHESIA 1ST 15 MINUTES: Performed by: ORTHOPAEDIC SURGERY

## 2025-03-06 PROCEDURE — 63600175 PHARM REV CODE 636 W HCPCS: Performed by: NURSE ANESTHETIST, CERTIFIED REGISTERED

## 2025-03-06 PROCEDURE — 71000033 HC RECOVERY, INTIAL HOUR: Performed by: ORTHOPAEDIC SURGERY

## 2025-03-06 PROCEDURE — 27201423 OPTIME MED/SURG SUP & DEVICES STERILE SUPPLY: Performed by: ORTHOPAEDIC SURGERY

## 2025-03-06 PROCEDURE — 36000711: Performed by: ORTHOPAEDIC SURGERY

## 2025-03-06 PROCEDURE — 36000710: Performed by: ORTHOPAEDIC SURGERY

## 2025-03-06 PROCEDURE — 27000716 HC OXISENSOR PROBE, ANY SIZE: Performed by: ANESTHESIOLOGY

## 2025-03-06 PROCEDURE — 25000003 PHARM REV CODE 250: Performed by: ANESTHESIOLOGY

## 2025-03-06 PROCEDURE — 25000003 PHARM REV CODE 250: Performed by: ORTHOPAEDIC SURGERY

## 2025-03-06 PROCEDURE — 27000510 HC BLANKET BAIR HUGGER ANY SIZE: Performed by: ANESTHESIOLOGY

## 2025-03-06 RX ORDER — ACETAMINOPHEN 500 MG
1000 TABLET ORAL EVERY 6 HOURS PRN
Status: DISCONTINUED | OUTPATIENT
Start: 2025-03-06 | End: 2025-03-06 | Stop reason: HOSPADM

## 2025-03-06 RX ORDER — SODIUM CHLORIDE 9 MG/ML
INJECTION, SOLUTION INTRAVENOUS CONTINUOUS
Status: DISCONTINUED | OUTPATIENT
Start: 2025-03-06 | End: 2025-03-06 | Stop reason: HOSPADM

## 2025-03-06 RX ORDER — CEFAZOLIN SODIUM 1 G/3ML
INJECTION, POWDER, FOR SOLUTION INTRAMUSCULAR; INTRAVENOUS
Status: DISCONTINUED | OUTPATIENT
Start: 2025-03-06 | End: 2025-03-06

## 2025-03-06 RX ORDER — GLYCOPYRROLATE 0.2 MG/ML
INJECTION INTRAMUSCULAR; INTRAVENOUS
Status: DISCONTINUED | OUTPATIENT
Start: 2025-03-06 | End: 2025-03-06

## 2025-03-06 RX ORDER — PROPOFOL 10 MG/ML
VIAL (ML) INTRAVENOUS
Status: DISCONTINUED | OUTPATIENT
Start: 2025-03-06 | End: 2025-03-06

## 2025-03-06 RX ORDER — FENTANYL CITRATE 50 UG/ML
INJECTION, SOLUTION INTRAMUSCULAR; INTRAVENOUS
Status: DISCONTINUED | OUTPATIENT
Start: 2025-03-06 | End: 2025-03-06

## 2025-03-06 RX ORDER — DIPHENHYDRAMINE HYDROCHLORIDE 50 MG/ML
25 INJECTION INTRAMUSCULAR; INTRAVENOUS EVERY 6 HOURS PRN
Status: DISCONTINUED | OUTPATIENT
Start: 2025-03-06 | End: 2025-03-06 | Stop reason: HOSPADM

## 2025-03-06 RX ORDER — SODIUM CHLORIDE, SODIUM LACTATE, POTASSIUM CHLORIDE, CALCIUM CHLORIDE 600; 310; 30; 20 MG/100ML; MG/100ML; MG/100ML; MG/100ML
125 INJECTION, SOLUTION INTRAVENOUS CONTINUOUS
Status: DISCONTINUED | OUTPATIENT
Start: 2025-03-06 | End: 2025-03-06 | Stop reason: HOSPADM

## 2025-03-06 RX ORDER — DEXAMETHASONE SODIUM PHOSPHATE 4 MG/ML
INJECTION, SOLUTION INTRA-ARTICULAR; INTRALESIONAL; INTRAMUSCULAR; INTRAVENOUS; SOFT TISSUE
Status: DISCONTINUED | OUTPATIENT
Start: 2025-03-06 | End: 2025-03-06

## 2025-03-06 RX ORDER — HYDROCODONE BITARTRATE AND ACETAMINOPHEN 10; 325 MG/1; MG/1
1 TABLET ORAL EVERY 4 HOURS PRN
Refills: 0 | Status: CANCELLED | OUTPATIENT
Start: 2025-03-06

## 2025-03-06 RX ORDER — IPRATROPIUM BROMIDE AND ALBUTEROL SULFATE 2.5; .5 MG/3ML; MG/3ML
3 SOLUTION RESPIRATORY (INHALATION)
Status: DISCONTINUED | OUTPATIENT
Start: 2025-03-06 | End: 2025-03-06 | Stop reason: HOSPADM

## 2025-03-06 RX ORDER — ONDANSETRON HYDROCHLORIDE 2 MG/ML
4 INJECTION, SOLUTION INTRAVENOUS DAILY PRN
Status: DISCONTINUED | OUTPATIENT
Start: 2025-03-06 | End: 2025-03-06 | Stop reason: HOSPADM

## 2025-03-06 RX ORDER — ONDANSETRON 4 MG/1
8 TABLET, ORALLY DISINTEGRATING ORAL EVERY 8 HOURS PRN
Status: DISCONTINUED | OUTPATIENT
Start: 2025-03-06 | End: 2025-03-06 | Stop reason: HOSPADM

## 2025-03-06 RX ORDER — ONDANSETRON 4 MG/1
4 TABLET, FILM COATED ORAL ONCE
Status: COMPLETED | OUTPATIENT
Start: 2025-03-06 | End: 2025-03-06

## 2025-03-06 RX ORDER — ONDANSETRON HYDROCHLORIDE 2 MG/ML
INJECTION, SOLUTION INTRAVENOUS
Status: DISCONTINUED | OUTPATIENT
Start: 2025-03-06 | End: 2025-03-06

## 2025-03-06 RX ORDER — LIDOCAINE HYDROCHLORIDE 20 MG/ML
INJECTION, SOLUTION EPIDURAL; INFILTRATION; INTRACAUDAL; PERINEURAL
Status: DISCONTINUED | OUTPATIENT
Start: 2025-03-06 | End: 2025-03-06

## 2025-03-06 RX ORDER — PROMETHAZINE HYDROCHLORIDE 25 MG/1
25 TABLET ORAL EVERY 6 HOURS PRN
Status: CANCELLED | OUTPATIENT
Start: 2025-03-06

## 2025-03-06 RX ORDER — DIMENHYDRINATE 50 MG
50 TABLET ORAL ONCE
Status: COMPLETED | OUTPATIENT
Start: 2025-03-06 | End: 2025-03-06

## 2025-03-06 RX ORDER — DIAZEPAM 5 MG/1
10 TABLET ORAL
Status: COMPLETED | OUTPATIENT
Start: 2025-03-06 | End: 2025-03-06

## 2025-03-06 RX ORDER — HYDROCODONE BITARTRATE AND ACETAMINOPHEN 5; 325 MG/1; MG/1
1 TABLET ORAL EVERY 6 HOURS PRN
Qty: 28 TABLET | Refills: 0 | Status: SHIPPED | OUTPATIENT
Start: 2025-03-06 | End: 2025-03-13

## 2025-03-06 RX ORDER — HYDROCODONE BITARTRATE AND ACETAMINOPHEN 5; 325 MG/1; MG/1
1 TABLET ORAL EVERY 4 HOURS PRN
Refills: 0 | Status: CANCELLED | OUTPATIENT
Start: 2025-03-06

## 2025-03-06 RX ORDER — MIDAZOLAM HYDROCHLORIDE 1 MG/ML
INJECTION INTRAMUSCULAR; INTRAVENOUS
Status: DISCONTINUED | OUTPATIENT
Start: 2025-03-06 | End: 2025-03-06

## 2025-03-06 RX ADMIN — DEXAMETHASONE SODIUM PHOSPHATE 8 MG: 4 INJECTION, SOLUTION INTRA-ARTICULAR; INTRALESIONAL; INTRAMUSCULAR; INTRAVENOUS; SOFT TISSUE at 12:03

## 2025-03-06 RX ADMIN — FENTANYL CITRATE 25 MCG: 50 INJECTION, SOLUTION INTRAMUSCULAR; INTRAVENOUS at 12:03

## 2025-03-06 RX ADMIN — PROPOFOL 170 MG: 10 INJECTION, EMULSION INTRAVENOUS at 11:03

## 2025-03-06 RX ADMIN — ONDANSETRON HYDROCHLORIDE 4 MG: 4 TABLET, FILM COATED ORAL at 10:03

## 2025-03-06 RX ADMIN — MIDAZOLAM HYDROCHLORIDE 2 MG: 1 INJECTION, SOLUTION INTRAMUSCULAR; INTRAVENOUS at 11:03

## 2025-03-06 RX ADMIN — DIMENHYDRINATE 50 MG: 50 TABLET ORAL at 10:03

## 2025-03-06 RX ADMIN — FENTANYL CITRATE 50 MCG: 50 INJECTION, SOLUTION INTRAMUSCULAR; INTRAVENOUS at 12:03

## 2025-03-06 RX ADMIN — CEFAZOLIN 2 G: 1 INJECTION, POWDER, FOR SOLUTION INTRAMUSCULAR; INTRAVENOUS; PARENTERAL at 12:03

## 2025-03-06 RX ADMIN — FENTANYL CITRATE 25 MCG: 50 INJECTION, SOLUTION INTRAMUSCULAR; INTRAVENOUS at 11:03

## 2025-03-06 RX ADMIN — ONDANSETRON 4 MG: 2 INJECTION INTRAMUSCULAR; INTRAVENOUS at 11:03

## 2025-03-06 RX ADMIN — SODIUM CHLORIDE: 9 INJECTION, SOLUTION INTRAVENOUS at 02:03

## 2025-03-06 RX ADMIN — GLYCOPYRROLATE 0.2 MG: 0.2 INJECTION INTRAMUSCULAR; INTRAVENOUS at 11:03

## 2025-03-06 RX ADMIN — SODIUM CHLORIDE: 9 INJECTION, SOLUTION INTRAVENOUS at 08:03

## 2025-03-06 RX ADMIN — LIDOCAINE HYDROCHLORIDE 70 MG: 20 INJECTION, SOLUTION INTRAVENOUS at 11:03

## 2025-03-06 RX ADMIN — DIAZEPAM 10 MG: 5 TABLET ORAL at 10:03

## 2025-03-06 RX ADMIN — SODIUM CHLORIDE: 9 INJECTION, SOLUTION INTRAVENOUS at 11:03

## 2025-03-06 NOTE — ANESTHESIA PREPROCEDURE EVALUATION
03/06/2025  Muna Newberry is a 59 y.o., female.      Pre-op Assessment    I have reviewed the Patient Summary Reports.     I have reviewed the Nursing Notes. I have reviewed the NPO Status.   I have reviewed the Medications.     Review of Systems  Anesthesia Hx:  No problems with previous Anesthesia                Social:  Non-Smoker, No Alcohol Use       Hematology/Oncology:    Oncology Normal    -- Anemia:                                  EENT/Dental:  EENT/Dental Normal           Cardiovascular:  Cardiovascular Normal                                              Pulmonary:    Asthma                    Renal/:  Renal/ Normal                 Hepatic/GI:  Hepatic/GI Normal       pancreatitis             Musculoskeletal:  Arthritis               Neurological:    Neuromuscular Disease,  Headaches     Occipital neuralgia      Chronic Pain Syndrome                         Endocrine:   Hypothyroidism          Dermatological:  Skin Normal    Psych:  Psychiatric Normal                    Physical Exam  General: Well nourished    Airway:  Mallampati: II / II  Mouth Opening: Normal  TM Distance: > 6 cm  Tongue: Normal  Neck ROM: Normal ROM    Chest/Lungs:  Clear to auscultation, Normal Respiratory Rate    Heart:  Rate: Normal  Rhythm: Regular Rhythm        Anesthesia Plan  Type of Anesthesia, risks & benefits discussed:    Anesthesia Type: Gen Supraglottic Airway  Intra-op Monitoring Plan: Standard ASA Monitors  Post Op Pain Control Plan: multimodal analgesia  Induction:  IV  Informed Consent: Informed consent signed with the Patient and all parties understand the risks and agree with anesthesia plan.  All questions answered.   ASA Score: 2  Day of Surgery Review of History & Physical: H&P Update referred to the surgeon/provider.I have interviewed and examined the patient. I have reviewed the patient's H&P  dated:     Ready For Surgery From Anesthesia Perspective.     .

## 2025-03-06 NOTE — OP NOTE
[facility]  Surgery Department  Operative Note    SUMMARY     Date of Procedure: 3/6/2025     Procedure: Procedure(s) (LRB):  ARTHROSCOPY, KNEE, WITH MEDIAL OR LATERAL MENISCECTOMY (Left)  ARTHROSCOPY, KNEE (Left)     Surgeons and Role:     * Salomon Sosa MD - Primary    Assisting Surgeon: None    Pre-Operative Diagnosis: Acute lateral meniscus tear of left knee, initial encounter [S83.282A]    Post-Operative Diagnosis: Post-Op Diagnosis Codes:     * Acute lateral meniscus tear of left knee, initial encounter [S83.282A]    Anesthesia: General    Technical Procedures Used:  Left knee arthroscopy           DEPARTMENT OF ORTHOPEDIC SURGERY                OPERATIVE REPORT     NAME:  Muna Newberry  MRN: [id number]               DATE OF SURGERY:  03/06/2025     PREOPERATIVE DIAGNOSIS:  left knee internal derangement    POSTOPERATIVE DIAGNOSIS:  ACL cyst, lateral meniscal tear-left knee    ANESTHESIA:  General.    PROCEDURE:  Examination under anesthesia, arthroscopy with intraarticular probing, excision ACL cyst, partial lateral meniscectomy    PROCEDURE IN DETAIL:  The patient was taken to the operating room and placed in the supine position.  After adequate level of general anesthesia had been achieved (See Anesthesia Note) patients left knee was examined.  There was 1+ intraarticular effusion.  Knee range of motion was 0-135 degrees of flexion.  Lachman exam was negative as is the FRD.  There is no medial or ligamentous instability appreciated.  Peg test produced intermittent popping felt to emanate from the lateral aspect of the joint.  Now, the leftlower extremity was scrubbed with Betadine and draped in sterile fashion.  The left lower extremity was elevated, exsanguinated with a Tyrese bandage.  A pneumatic tourniquet about the upper thigh, to pressure of 300 millimeters of Mercury.  The operation was begun by making a small stab wound about the superolateral aspect  of the right patella.  A trocar was introduced into the suprapatellar pouch and the knee was distended with sterile saline.  Second and third stab wounds were made about the medial and lateral aspects of the patellar tendon for introduction of the arthroscopy camera and intraarticular probe.  Now a systematic evaluation of the knee was carried out.  Inspection of the suprapatellar pouch was unremarkable.  Exam of the patellofemoral joint showed minimal degenerative changes and good tracking in the midline.  Lateral gutter was unremarkable with no loose bodies were encountered.  Exam of the medial joint showed normal femorotibial cartilage and an intact medial meniscus to probing.  Examiner condylar notch revealed some degenerative changes involving the anterior cruciate ligament.  There was a cyst at its medial base on the tibia.  The cyst was removed and contoured with the radiofrequency Wand.  Exam lateral joint showed an area of degenerative tearing involving the anterior horn and mesial root.  These areas were debrided with a shaver and contoured with the radiofrequency Wand.Now, the tourniquet was let down.  Hemostasis was achieved with Bovie electrocautery.  Knee joint was irrigated copiously with antibiotic solution and arthroscope withdrawn.  The stab wounds were reapproximated with interrupted 4-0 suture.  The wounds were dressed sterilely.  The patient was taken to the recovery room in satisfactory condition.  ESTIMATED BLOOD LOSS:  1ccs.   Tourniquet time: 12 minutes.  The patient received Ancef antibiotic intravenously prior to the procedure.      Salomon Sosa     Description of the Findings of the Procedure:  ACL cyst, lateral meniscal tear    Significant Surgical Tasks Conducted by the Assistant(s), if Applicable:  None    Complications: No    Estimated Blood Loss (EBL): 1 mL           Implants: * No implants in log *    Specimens:   Specimen (24h ago, onward)      None                     Condition: Good    Disposition: PACU - hemodynamically stable.    Attestation: I was present and scrubbed for the entire procedure.

## 2025-03-06 NOTE — HPI
Chief complaint: Left knee pain   History:   Muna Newberry is a 59 y.o. female seen for recheck of her left knee.  She underwent MRI examination on 01/29/2025.  The MRI indicates possible ganglion cyst and mucinous degeneration of the ACL.  There is however evidence of a horizontal tear of the anterior horn of the lateral meniscus with a flap component.    Impression:  Internal derangement-left knee   Plan:  Left knee arthroscopy.  Outpatient general anesthesia planned.  Potential benefits and risks of surgery were outlined to include but not limited to bleeding, infection, damage blood vessels and nerves, need for further surgery, other risks and complications including even death the patient wished to proceed.

## 2025-03-06 NOTE — BRIEF OP NOTE
"[facility] - [facility]  Brief Operative Note    Surgery Date: 3/6/2025     Surgeons and Role:     * Salomon Sosa MD - Primary    Assisting Surgeon: None    Pre-op Diagnosis:  Acute lateral meniscus tear of left knee, initial encounter [S83.282A]    Post-op Diagnosis:  Post-Op Diagnosis Codes:     * Acute lateral meniscus tear of left knee, initial encounter [S83.282A]    Procedure(s) (LRB):  ARTHROSCOPY, KNEE, WITH MEDIAL OR LATERAL MENISCECTOMY (Left)  ARTHROSCOPY, KNEE (Left)    Anesthesia: General    Description of the findings of the procedure(s): See Op Note     Estimated Blood Loss: 1 mL         Specimens:   Specimen (24h ago, onward)      None              Discharge Note    OUTCOME: Patient tolerated treatment/procedure well without complication and is now ready for discharge.    DISPOSITION: Home or Self Care    FINAL DIAGNOSIS:  Acute lateral meniscus tear of left knee    FOLLOWUP: In clinic    DISCHARGE INSTRUCTIONS:    Discharge Procedure Orders   CRUTCHES FOR HOME USE     Order Specific Question Answer Comments   Type: Axillary    Height: 5' 1" (1.549 m)    Weight: 65.8 kg (145 lb)    Length of need (1-99 months): 2      Diet general     Keep surgical extremity elevated     Ice to affected area   Order Comments: using barrier between ice and skin (specify duration&frequency)     Call MD for:  temperature >100.4     Call MD for:  persistent nausea and vomiting     Call MD for:  severe uncontrolled pain     Call MD for:  difficulty breathing, headache or visual disturbances     Call MD for:  redness, tenderness, or signs of infection (pain, swelling, redness, odor or green/yellow discharge around incision site)     Call MD for:  hives     Call MD for:  persistent dizziness or light-headedness     Call MD for:  extreme fatigue     Leave dressing on - Keep it clean, dry, and intact until clinic visit     Activity as tolerated     Shower on day dressing removed (No bath) "     Weight bearing as tolerated        Clinical Reference Documents Added to Patient Instructions         Document    KNEE ARTHROSCOPY DISCHARGE INSTRUCTIONS (ENGLISH)

## 2025-03-06 NOTE — TRANSFER OF CARE
"Anesthesia Transfer of Care Note    Patient: Muna Newberry    Procedure(s) Performed: Procedure(s) (LRB):  ARTHROSCOPY, KNEE, WITH MEDIAL OR LATERAL MENISCECTOMY (Left)    Patient location: PACU    Anesthesia Type: general    Transport from OR: Transported from OR on 6-10 L/min O2 by face mask with adequate spontaneous ventilation    Post pain: adequate analgesia    Post assessment: no apparent anesthetic complications    Post vital signs: stable    Level of consciousness: responds to stimulation and sedated    Nausea/Vomiting: no nausea/vomiting    Complications: none    Transfer of care protocol was followedComments: Good SV continue, NAD noted, VSS, RTRN      Last vitals: Visit Vitals  /60 (BP Location: Left arm, Patient Position: Lying)   Pulse 92   Temp 36.5 °C (97.7 °F) (Oral)   Resp 15   Ht 5' 1" (1.549 m)   Wt 65.8 kg (145 lb)   SpO2 100%   Breastfeeding No   BMI 27.40 kg/m²     "

## 2025-03-06 NOTE — ANESTHESIA POSTPROCEDURE EVALUATION
Anesthesia Post Evaluation    Patient: Muna Newberry    Procedure(s) Performed: Procedure(s) (LRB):  ARTHROSCOPY, KNEE, WITH MEDIAL OR LATERAL MENISCECTOMY (Left)  ARTHROSCOPY, KNEE (Left)    Final Anesthesia Type: general      Patient location during evaluation: PACU  Patient participation: Yes- Able to Participate  Level of consciousness: awake and sedated  Post-procedure vital signs: reviewed and stable  Pain management: adequate  Airway patency: patent    PONV status at discharge: No PONV  Anesthetic complications: no      Cardiovascular status: blood pressure returned to baseline  Respiratory status: unassisted  Hydration status: euvolemic  Follow-up not needed.              Vitals Value Taken Time   /78 03/06/25 13:25   Temp 36.5 °C (97.7 °F) 03/06/25 12:57   Pulse 74 03/06/25 13:26   Resp 10 03/06/25 13:26   SpO2 99 % 03/06/25 13:26   Vitals shown include unfiled device data.      No case tracking events are documented in the log.      Pain/Pérez Score: Pérez Score: 9 (3/6/2025  1:10 PM)

## 2025-03-06 NOTE — H&P
[facility] - [facility]  Orthopedics  H&P    Patient Name: Muna Newberry  MRN: [id number]  Admission Date: (Not on file)  Primary Care Provider: Sharita Briggs FNP    Patient information was obtained from patient and ER records.     Subjective:     Principal Problem:Acute lateral meniscus tear of left knee    Chief Complaint: No chief complaint on file.       HPI: Chief complaint: Left knee pain   History:   Muna Newberry is a 59 y.o. female seen for recheck of her left knee.  She underwent MRI examination on 01/29/2025.  The MRI indicates possible ganglion cyst and mucinous degeneration of the ACL.  There is however evidence of a horizontal tear of the anterior horn of the lateral meniscus with a flap component.    Impression:  Internal derangement-left knee   Plan:  Left knee arthroscopy.  Outpatient general anesthesia planned.  Potential benefits and risks of surgery were outlined to include but not limited to bleeding, infection, damage blood vessels and nerves, need for further surgery, other risks and complications including even death the patient wished to proceed.        Past Medical History:   Diagnosis Date    Herpes zoster without complication 12/20/2021    Hyperplastic rectal polyp 12/19/2016    Idiopathic thrombotic thrombocytopenic purpura     Osteoarthrosis     Osteoporosis     Personal history of colonic polyps 12/19/2016    PONV (postoperative nausea and vomiting)        Past Surgical History:   Procedure Laterality Date    APPENDECTOMY      ARTHROSCOPIC DEBRIDEMENT OF SHOULDER  3/12/2024    Procedure: DEBRIDEMENT, SHOULDER, ARTHROSCOPIC;  Surgeon: Salomon Sosa MD;  Location: [facility] OR;  Service: Orthopedics;;    ARTHROSCOPY OF SHOULDER WITH DECOMPRESSION OF SUBACROMIAL SPACE Left 3/12/2024    Procedure: ARTHROSCOPY, SHOULDER, WITH SUBACROMIAL SPACE DECOMPRESSION;  Surgeon: Salomon Sosa MD;  Location: [facility] OR;  Service:  Orthopedics;  Laterality: Left;    BREAST BIOPSY      CHOLECYSTECTOMY      COLONOSCOPY  12/19/2016    Dr. [facility]    HYSTERECTOMY      OOPHORECTOMY      SHOULDER ARTHROSCOPY Left 3/12/2024    Procedure: ARTHROSCOPY, SHOULDER;  Surgeon: Salomon Sosa MD;  Location: [facility];  Service: Orthopedics;  Laterality: Left;    SPINAL CORD STIMULATOR IMPLANT      TONSILLECTOMY         Review of patient's allergies indicates:   Allergen Reactions    Phenergan [promethazine]      IV phenergan only    Sulfa (sulfonamide antibiotics) Blisters    Opioids - morphine analogues Rash    Penicillins Rash       No current facility-administered medications for this encounter.     Current Outpatient Medications   Medication Sig    albuterol (VENTOLIN HFA) 90 mcg/actuation inhaler Inhale 2 puffs into the lungs every 6 (six) hours as needed for Wheezing. Rescue    alendronate (FOSAMAX) 70 MG tablet Take 1 tablet (70 mg total) by mouth every 7 days. Take orally once a week on same day of week.  Take first thing in am with a glass of water.  For next 30 minutes, do not eat, drink, take medications or supplements, recline or lie down.    baclofen (LIORESAL) 10 MG tablet Take 10 mg by mouth as needed.    cyanocobalamin 1,000 mcg/mL injection Inject 1 mL (1,000 mcg total) into the muscle every 30 days. Include needles and syringes for administration.    ergocalciferol (VITAMIN D2) 50,000 unit Cap Take 50,000 Units by mouth every 7 days. Once weekly    HYDROcodone-acetaminophen (NORCO) 5-325 mg per tablet Take 0.5-1 tablets by mouth nightly as needed for Pain.    meloxicam (MOBIC) 15 MG tablet Take 1 tablet (15 mg total) by mouth once daily.    ondansetron (ZOFRAN) 4 MG tablet Take 1 tablet (4 mg total) by mouth every 6 (six) hours as needed for Nausea.    rimegepant (NURTEC) 75 mg odt Take 1 tablet (75 mg total) by mouth once as needed for Migraine. Place ODT tablet on the tongue; alternatively the ODT  tablet may be placed under the tongue    SYNTHROID 88 mcg tablet Take 1 tablet (88 mcg total) by mouth before breakfast. Brand name only.    traMADoL (ULTRAM) 50 mg tablet Take 50 mg by mouth nightly. Take 1/2-1 tablet at bedtime daily prn.     Family History       Problem Relation (Age of Onset)    Breast cancer Paternal Aunt    Heart disease Mother    Leukemia Father          Tobacco Use    Smoking status: Never    Smokeless tobacco: Never   Substance and Sexual Activity    Alcohol use: Never    Drug use: Yes     Types: Hydrocodone    Sexual activity: Not Currently     Review of Systems   Constitutional: Negative.     Objective:     Vital Signs (Most Recent):    Vital Signs (24h Range):  BP: ()/()   Arterial Line BP: ()/()            There is no height or weight on file to calculate BMI.    No intake or output data in the 24 hours ending 03/05/25 2056     General    Vitals reviewed.  Constitutional: She is oriented to person, place, and time. She appears well-developed and well-nourished.   HENT:   Head: Normocephalic and atraumatic.   Eyes: EOM are normal. Pupils are equal, round, and reactive to light.   Neck: Neck supple.   Cardiovascular:  Normal rate, regular rhythm and normal heart sounds.            Pulmonary/Chest: Effort normal and breath sounds normal.   Abdominal: Soft. Bowel sounds are normal.   Neurological: She is alert and oriented to person, place, and time.   Psychiatric: She has a normal mood and affect. Her behavior is normal.     General Musculoskeletal Exam   Gait: antalgic       Right Knee Exam   Right knee exam is normal.    Left Knee Exam     Inspection   Effusion: present    Crepitus   The patient has crepitus of the lateral joint line.    Range of Motion   The patient has normal left knee ROM.    Tests   Meniscus   Peg:   Lateral - positive  Stability   Lachman: normal (-1 to 2mm)   PCL-Posterior Drawer: normal (0 to 2mm)  MCL - Valgus: normal (0 to 2mm)  LCL - Varus: normal (0 to  2mm)  Pivot Shift: normal (Equal)    Other   Sensation: normal    Vascular Exam       Left Pulses  Dorsalis Pedis:      2+  Posterior Tibial:      2+           Significant Labs: All pertinent labs within the past 24 hours have been reviewed.    Significant Imaging: I have reviewed all pertinent imaging results/findings.  Assessment/Plan:     No notes have been filed under this hospital service.  Service: Orthopedic Surgery      Salmoon Sosa MD  Orthopedics  [facility]

## 2025-03-06 NOTE — INTERVAL H&P NOTE
The patient has been examined and the H&P has been reviewed:    I concur with the findings and no changes have occurred since H&P was written.    Surgery risks, benefits and alternative options discussed and understood by patient/family.          Active Hospital Problems    Diagnosis  POA    *Acute lateral meniscus tear of left knee [S84.307A]  Yes      Resolved Hospital Problems   No resolved problems to display.

## 2025-03-06 NOTE — DISCHARGE SUMMARY
"[facility] - Orthopedic Periop Services  Discharge Note  Short Stay    Procedure(s) (LRB):  ARTHROSCOPY, KNEE, WITH MEDIAL OR LATERAL MENISCECTOMY (Left)  ARTHROSCOPY, KNEE (Left)      OUTCOME: Patient tolerated treatment/procedure well without complication and is now ready for discharge.    DISPOSITION: Home or Self Care    FINAL DIAGNOSIS:  Acute lateral meniscus tear of left knee    FOLLOWUP: In clinic    DISCHARGE INSTRUCTIONS:    Discharge Procedure Orders   CRUTCHES FOR HOME USE     Order Specific Question Answer Comments   Type: Axillary    Height: 5' 1" (1.549 m)    Weight: 65.8 kg (145 lb)    Length of need (1-99 months): 2      Diet general     Keep surgical extremity elevated     Ice to affected area   Order Comments: using barrier between ice and skin (specify duration&frequency)     Call MD for:  temperature >100.4     Call MD for:  persistent nausea and vomiting     Call MD for:  severe uncontrolled pain     Call MD for:  difficulty breathing, headache or visual disturbances     Call MD for:  redness, tenderness, or signs of infection (pain, swelling, redness, odor or green/yellow discharge around incision site)     Call MD for:  hives     Call MD for:  persistent dizziness or light-headedness     Call MD for:  extreme fatigue     Leave dressing on - Keep it clean, dry, and intact until clinic visit     Activity as tolerated     Shower on day dressing removed (No bath)     Weight bearing as tolerated         Clinical Reference Documents Added to Patient Instructions         Document    KNEE ARTHROSCOPY DISCHARGE INSTRUCTIONS (ENGLISH)            TIME SPENT ON DISCHARGE: 15 minutes  "

## 2025-03-06 NOTE — ANESTHESIA PROCEDURE NOTES
Intubation    Date/Time: 3/6/2025 11:58 AM    Performed by: Marquez Tubbs CRNA  Authorized by: Mando Ames MD    Intubation:     Induction:  Intravenous    Intubated:  Postinduction    Mask Ventilation:  Easy mask    Attempts:  1    Attempted By:  CRNA    Difficult Airway Encountered?: No      Complications:  None    Airway Device:  Supraglottic airway/LMA    Airway Device Size:  4.0    Style/Cuff Inflation:  Cuffed    Endotracheal tube placement: TO SEAL.    Placement Verified By:  Capnometry    Complicating Factors:  None    Findings Post-Intubation:  BS equal bilateral and atraumatic/condition of teeth unchanged  Notes:      SMOOTH, TOLERATED WELL

## 2025-03-06 NOTE — SUBJECTIVE & OBJECTIVE
Past Medical History:   Diagnosis Date    Herpes zoster without complication 12/20/2021    Hyperplastic rectal polyp 12/19/2016    Idiopathic thrombotic thrombocytopenic purpura     Osteoarthrosis     Osteoporosis     Personal history of colonic polyps 12/19/2016    PONV (postoperative nausea and vomiting)        Past Surgical History:   Procedure Laterality Date    APPENDECTOMY      ARTHROSCOPIC DEBRIDEMENT OF SHOULDER  3/12/2024    Procedure: DEBRIDEMENT, SHOULDER, ARTHROSCOPIC;  Surgeon: Salomon Sosa MD;  Location: [facility] OR;  Service: Orthopedics;;    ARTHROSCOPY OF SHOULDER WITH DECOMPRESSION OF SUBACROMIAL SPACE Left 3/12/2024    Procedure: ARTHROSCOPY, SHOULDER, WITH SUBACROMIAL SPACE DECOMPRESSION;  Surgeon: Salomon Sosa MD;  Location: [facility] OR;  Service: Orthopedics;  Laterality: Left;    BREAST BIOPSY      CHOLECYSTECTOMY      COLONOSCOPY  12/19/2016    Dr. Ed [facility]ame [facility] - [facility]    HYSTERECTOMY      OOPHORECTOMY      SHOULDER ARTHROSCOPY Left 3/12/2024    Procedure: ARTHROSCOPY, SHOULDER;  Surgeon: Salomon Sosa MD;  Location: [facility] OR;  Service: Orthopedics;  Laterality: Left;    SPINAL CORD STIMULATOR IMPLANT      TONSILLECTOMY         Review of patient's allergies indicates:   Allergen Reactions    Phenergan [promethazine]      IV phenergan only    Sulfa (sulfonamide antibiotics) Blisters    Opioids - morphine analogues Rash    Penicillins Rash       No current facility-administered medications for this encounter.     Current Outpatient Medications   Medication Sig    albuterol (VENTOLIN HFA) 90 mcg/actuation inhaler Inhale 2 puffs into the lungs every 6 (six) hours as needed for Wheezing. Rescue    alendronate (FOSAMAX) 70 MG tablet Take 1 tablet (70 mg total) by mouth every 7 days. Take orally once a week on same day of week.  Take first thing in am with a glass of water.  For next 30 minutes, do not eat, drink, take medications or  supplements, recline or lie down.    baclofen (LIORESAL) 10 MG tablet Take 10 mg by mouth as needed.    cyanocobalamin 1,000 mcg/mL injection Inject 1 mL (1,000 mcg total) into the muscle every 30 days. Include needles and syringes for administration.    ergocalciferol (VITAMIN D2) 50,000 unit Cap Take 50,000 Units by mouth every 7 days. Once weekly    HYDROcodone-acetaminophen (NORCO) 5-325 mg per tablet Take 0.5-1 tablets by mouth nightly as needed for Pain.    meloxicam (MOBIC) 15 MG tablet Take 1 tablet (15 mg total) by mouth once daily.    ondansetron (ZOFRAN) 4 MG tablet Take 1 tablet (4 mg total) by mouth every 6 (six) hours as needed for Nausea.    rimegepant (NURTEC) 75 mg odt Take 1 tablet (75 mg total) by mouth once as needed for Migraine. Place ODT tablet on the tongue; alternatively the ODT tablet may be placed under the tongue    SYNTHROID 88 mcg tablet Take 1 tablet (88 mcg total) by mouth before breakfast. Brand name only.    traMADoL (ULTRAM) 50 mg tablet Take 50 mg by mouth nightly. Take 1/2-1 tablet at bedtime daily prn.     Family History       Problem Relation (Age of Onset)    Breast cancer Paternal Aunt    Heart disease Mother    Leukemia Father          Tobacco Use    Smoking status: Never    Smokeless tobacco: Never   Substance and Sexual Activity    Alcohol use: Never    Drug use: Yes     Types: Hydrocodone    Sexual activity: Not Currently     Review of Systems   Constitutional: Negative.     Objective:     Vital Signs (Most Recent):    Vital Signs (24h Range):  BP: ()/()   Arterial Line BP: ()/()            There is no height or weight on file to calculate BMI.    No intake or output data in the 24 hours ending 03/05/25 2056     General    Vitals reviewed.  Constitutional: She is oriented to person, place, and time. She appears well-developed and well-nourished.   HENT:   Head: Normocephalic and atraumatic.   Eyes: EOM are normal. Pupils are equal, round, and reactive to light.   Neck:  Neck supple.   Cardiovascular:  Normal rate, regular rhythm and normal heart sounds.            Pulmonary/Chest: Effort normal and breath sounds normal.   Abdominal: Soft. Bowel sounds are normal.   Neurological: She is alert and oriented to person, place, and time.   Psychiatric: She has a normal mood and affect. Her behavior is normal.     General Musculoskeletal Exam   Gait: antalgic       Right Knee Exam   Right knee exam is normal.    Left Knee Exam     Inspection   Effusion: present    Crepitus   The patient has crepitus of the lateral joint line.    Range of Motion   The patient has normal left knee ROM.    Tests   Meniscus   Peg:   Lateral - positive  Stability   Lachman: normal (-1 to 2mm)   PCL-Posterior Drawer: normal (0 to 2mm)  MCL - Valgus: normal (0 to 2mm)  LCL - Varus: normal (0 to 2mm)  Pivot Shift: normal (Equal)    Other   Sensation: normal    Vascular Exam       Left Pulses  Dorsalis Pedis:      2+  Posterior Tibial:      2+           Significant Labs: All pertinent labs within the past 24 hours have been reviewed.    Significant Imaging: I have reviewed all pertinent imaging results/findings.

## 2025-03-13 ENCOUNTER — OFFICE VISIT (OUTPATIENT)
Dept: ORTHOPEDICS | Facility: CLINIC | Age: 60
End: 2025-03-13
Payer: COMMERCIAL

## 2025-03-13 DIAGNOSIS — Z98.890 S/P LEFT KNEE ARTHROSCOPY: Primary | ICD-10-CM

## 2025-03-13 PROCEDURE — 99999 PR PBB SHADOW E&M-EST. PATIENT-LVL II: CPT | Mod: PBBFAC,,,

## 2025-03-13 NOTE — LETTER
March 13, 2025      [facility] - Orthoped[facility]  [address] [place] [id number]  Phone: [phone]  Fax: [phone]       Patient: Muna Newberry   YOB: 1965  Date of Visit: 03/13/2025    To Whom It May Concern:    Ant Newberry  was at [facility] on 03/13/2025. The patient will need to be excused from March 6th and 7th. Patient may return to work/school on 03/17/2025 with no restrictions. If you have any questions or concerns, or if I can be of further assistance, please do not hesitate to contact me.    Sincerely,  ALBARO Bass, CMA

## 2025-03-13 NOTE — PROGRESS NOTES
Arthroscopy, Knee, With Medial Or Lateral Meniscectomy - Left and Arthroscopy, Knee - Left 3/6/2025    Muna Newberry is a 59 y.o. female seen today for post-op visit.  Patient is 1 week status post left knee arthroscopy by Dr. Sosa.  She is ambulating independently today without use of crutches, no complaints.      PAST MEDICAL HISTORY:   Past Medical History:   Diagnosis Date    Herpes zoster without complication 12/20/2021    Hyperplastic rectal polyp 12/19/2016    Idiopathic thrombotic thrombocytopenic purpura     Osteoarthrosis     Osteoporosis     Personal history of colonic polyps 12/19/2016    PONV (postoperative nausea and vomiting)         PAST SURGICAL HISTORY:   Past Surgical History:   Procedure Laterality Date    APPENDECTOMY      ARTHROSCOPIC DEBRIDEMENT OF SHOULDER  3/12/2024    Procedure: DEBRIDEMENT, SHOULDER, ARTHROSCOPIC;  Surgeon: Salomon Sosa MD;  Location: [facility] OR;  Service: Orthopedics;;    ARTHROSCOPY OF KNEE Left 3/6/2025    Procedure: ARTHROSCOPY, KNEE;  Surgeon: Salomon Sosa MD;  Location: [facility] OR;  Service: Orthopedics;  Laterality: Left;  INCISION OF ACL CYST    ARTHROSCOPY OF SHOULDER WITH DECOMPRESSION OF SUBACROMIAL SPACE Left 3/12/2024    Procedure: ARTHROSCOPY, SHOULDER, WITH SUBACROMIAL SPACE DECOMPRESSION;  Surgeon: Salomon Sosa MD;  Location: [facility] OR;  Service: Orthopedics;  Laterality: Left;    ARTHROSCOPY, KNEE, WITH MEDIAL OR LATERAL MENISCECTOMY Left 3/6/2025    Procedure: ARTHROSCOPY, KNEE, WITH MEDIAL OR LATERAL MENISCECTOMY;  Surgeon: Salomon Sosa MD;  Location: [facility] OR;  Service: Orthopedics;  Laterality: Left;  PARTIAL LATERAL MENISECTOMY    BREAST BIOPSY      CHOLECYSTECTOMY      COLONOSCOPY  12/19/2016    Dr. [facility] - [facility]    HYSTERECTOMY      OOPHORECTOMY      SHOULDER ARTHROSCOPY Left 3/12/2024    Procedure: ARTHROSCOPY, SHOULDER;  Surgeon: Ian  Salomon DORADO MD;  Location: [facility];  Service: Orthopedics;  Laterality: Left;    SPINAL CORD STIMULATOR IMPLANT      TONSILLECTOMY          MEDICATIONS: Current Medications[1]       PHYSICAL EXAM:      GENERAL: Well-developed, well-nourished female . The patient is alert, oriented and cooperative.    EXTREMITIES:  Left knee minimal swelling and erythema around incision site, good range of motion, neurovascularly intact    WOUND: Incisions are clean,dry,intact without signs of infection and healing well      RADIOGRAPHIC FINDINGS:   None today     Patient Active Problem List    Diagnosis Date Noted    Pain of right calf 02/12/2025    Acute lateral meniscus tear of left knee 02/03/2025    Left knee pain 07/03/2024    Diverticulitis 04/23/2024    Left lower quadrant pain 04/23/2024    Shoulder impingement 02/19/2024    Traumatic incomplete tear of left rotator cuff 01/24/2024    Left shoulder pain 01/08/2024    Chronic pain of multiple joints 10/02/2023    Primary osteoarthritis involving multiple joints     Cobalamin deficiency     Acute non-recurrent maxillary sinusitis 07/12/2023    Occipital neuralgia of left side 06/01/2023    Restless legs syndrome 12/27/2022    Idiopathic chronic pancreatitis 12/27/2022    Iron deficiency anemia 12/27/2022    Chronic migraine with aura 12/27/2022    Primary osteoarthritis of left hand 12/06/2021    Acquired hypothyroidism 04/21/2021    Osteoporosis 04/21/2021    Vitamin D deficiency 04/21/2021    Mild intermittent asthma without complication 04/21/2021    Chronic sinusitis 01/25/2021    Chronic pain 06/13/2020     IMPRESSION AND PLAN: Patient is status-post Arthroscopy, Knee, With Medial Or Lateral Meniscectomy - Left and Arthroscopy, Knee - Left doing well. All incisional sutures/staples removed today and replaced with Steri-Strips, discussed that these can get wet in the shower in 2-3 days, let them fall off on their own.  Patient would like to have and evaluation by  [facility] status post left knee arthroscopy, orders faxed today.  Follow up Dr. Sosa 3 weeks.    No follow-ups on file.       Suha Hendrix PA-C      (Subject to voice recognition error, transcription service not allowed)       [1]   Current Outpatient Medications:     albuterol (VENTOLIN HFA) 90 mcg/actuation inhaler, Inhale 2 puffs into the lungs every 6 (six) hours as needed for Wheezing. Rescue, Disp: 18 g, Rfl: 5    alendronate (FOSAMAX) 70 MG tablet, Take 1 tablet (70 mg total) by mouth every 7 days. Take orally once a week on same day of week.  Take first thing in am with a glass of water.  For next 30 minutes, do not eat, drink, take medications or supplements, recline or lie down., Disp: 4 tablet, Rfl: 11    baclofen (LIORESAL) 10 MG tablet, Take 10 mg by mouth as needed., Disp: , Rfl:     cyanocobalamin 1,000 mcg/mL injection, Inject 1 mL (1,000 mcg total) into the muscle every 30 days. Include needles and syringes for administration., Disp: 10 mL, Rfl: 1    ergocalciferol (VITAMIN D2) 50,000 unit Cap, Take 50,000 Units by mouth every 7 days. Once weekly, Disp: , Rfl:     HYDROcodone-acetaminophen (NORCO) 5-325 mg per tablet, Take 0.5-1 tablets by mouth nightly as needed for Pain., Disp: , Rfl:     HYDROcodone-acetaminophen (NORCO) 5-325 mg per tablet, Take 1 tablet by mouth every 6 hours as needed for pain.......May cause drowsiness., Disp: 28 tablet, Rfl: 0    meloxicam (MOBIC) 15 MG tablet, Take 1 tablet (15 mg total) by mouth once daily., Disp: 30 tablet, Rfl: 3    ondansetron (ZOFRAN) 4 MG tablet, Take 1 tablet (4 mg total) by mouth every 6 (six) hours as needed for Nausea., Disp: 30 tablet, Rfl: 0    rimegepant (NURTEC) 75 mg odt, Take 1 tablet (75 mg total) by mouth once as needed for Migraine. Place ODT tablet on the tongue; alternatively the ODT tablet may be placed under the tongue, Disp: 16 tablet, Rfl: 2    SYNTHROID 88 mcg tablet, Take 1 tablet (88 mcg total) by mouth before  breakfast. Brand name only., Disp: 90 tablet, Rfl: 3    traMADoL (ULTRAM) 50 mg tablet, Take 50 mg by mouth nightly. Take 1/2-1 tablet at bedtime daily prn., Disp: , Rfl:

## 2025-03-31 ENCOUNTER — TELEPHONE (OUTPATIENT)
Dept: GASTROENTEROLOGY | Facility: CLINIC | Age: 60
End: 2025-03-31
Payer: COMMERCIAL

## 2025-04-09 ENCOUNTER — OFFICE VISIT (OUTPATIENT)
Dept: ORTHOPEDICS | Facility: CLINIC | Age: 60
End: 2025-04-09
Payer: COMMERCIAL

## 2025-04-09 DIAGNOSIS — Z09 POSTOP CHECK: Primary | ICD-10-CM

## 2025-04-09 PROCEDURE — 99024 POSTOP FOLLOW-UP VISIT: CPT | Mod: S$GLB,,, | Performed by: ORTHOPAEDIC SURGERY

## 2025-04-09 PROCEDURE — 1159F MED LIST DOCD IN RCRD: CPT | Mod: S$GLB,,, | Performed by: ORTHOPAEDIC SURGERY

## 2025-04-09 PROCEDURE — 1160F RVW MEDS BY RX/DR IN RCRD: CPT | Mod: S$GLB,,, | Performed by: ORTHOPAEDIC SURGERY

## 2025-04-09 PROCEDURE — 99999 PR PBB SHADOW E&M-EST. PATIENT-LVL III: CPT | Mod: PBBFAC,,, | Performed by: ORTHOPAEDIC SURGERY

## 2025-04-09 NOTE — PROGRESS NOTES
CLINIC NOTE       Chief Complaint   Patient presents with    Left Knee - Follow-up        Muna Newberry is a 59 y.o. female seen today for recheck of her left knee.  She is now 4 weeks following left knee arthroscopy.  He is found to have a cyst at the base of the ACL and a complex degenerative tear of the lateral meniscus.  She has had excellent relief of symptoms.  He is ambulating independently.  She is completing her physical therapy efforts.      Past Medical History:   Diagnosis Date    Herpes zoster without complication 12/20/2021    Hyperplastic rectal polyp 12/19/2016    Idiopathic thrombotic thrombocytopenic purpura     Osteoarthrosis     Osteoporosis     Personal history of colonic polyps 12/19/2016    PONV (postoperative nausea and vomiting)      Family History   Problem Relation Name Age of Onset    Heart disease Mother      Leukemia Father      Breast cancer Paternal Aunt       Medications Ordered Prior to Encounter[1]    ROS     There were no vitals filed for this visit.    Past Surgical History:   Procedure Laterality Date    APPENDECTOMY      ARTHROSCOPIC DEBRIDEMENT OF SHOULDER  3/12/2024    Procedure: DEBRIDEMENT, SHOULDER, ARTHROSCOPIC;  Surgeon: Salomon Sosa MD;  Location: [facility] OR;  Service: Orthopedics;;    ARTHROSCOPY OF KNEE Left 3/6/2025    Procedure: ARTHROSCOPY, KNEE;  Surgeon: Salomon Sosa MD;  Location: [facility] OR;  Service: Orthopedics;  Laterality: Left;  INCISION OF ACL CYST    ARTHROSCOPY OF SHOULDER WITH DECOMPRESSION OF SUBACROMIAL SPACE Left 3/12/2024    Procedure: ARTHROSCOPY, SHOULDER, WITH SUBACROMIAL SPACE DECOMPRESSION;  Surgeon: Salomon Sosa MD;  Location: [facility] OR;  Service: Orthopedics;  Laterality: Left;    ARTHROSCOPY, KNEE, WITH MEDIAL OR LATERAL MENISCECTOMY Left 3/6/2025    Procedure: ARTHROSCOPY, KNEE, WITH MEDIAL OR LATERAL MENISCECTOMY;  Surgeon: Salomon Sosa MD;  Location: [facility] OR;   Service: Orthopedics;  Laterality: Left;  PARTIAL LATERAL MENISECTOMY    BREAST BIOPSY      CHOLECYSTECTOMY      COLONOSCOPY  12/19/2016    Dr. Ed [facility]    HYSTERECTOMY      OOPHORECTOMY      SHOULDER ARTHROSCOPY Left 3/12/2024    Procedure: ARTHROSCOPY, SHOULDER;  Surgeon: Salomon Sosa MD;  Location: [facility] OR;  Service: Orthopedics;  Laterality: Left;    SPINAL CORD STIMULATOR IMPLANT      TONSILLECTOMY          Review of patient's allergies indicates:   Allergen Reactions    Phenergan [promethazine]      IV phenergan only    Sulfa (sulfonamide antibiotics) Blisters    Opioids - morphine analogues Rash    Penicillins Rash        Ortho Exam exam left knee shows well-healed arthroscopy portal incisions.  No effusion.  Knee range motion 0-135 degrees of flexion.    Radiographic Examination:    Technique:    Findings:    Impression:   See Above    Assessment and Plan  Patient Active Problem List    Diagnosis Date Noted    Pain of right calf 02/12/2025    Acute lateral meniscus tear of left knee 02/03/2025    Left knee pain 07/03/2024    Diverticulitis 04/23/2024    Left lower quadrant pain 04/23/2024    Shoulder impingement 02/19/2024    Traumatic incomplete tear of left rotator cuff 01/24/2024    Left shoulder pain 01/08/2024    Chronic pain of multiple joints 10/02/2023    Primary osteoarthritis involving multiple joints     Cobalamin deficiency     Acute non-recurrent maxillary sinusitis 07/12/2023    Occipital neuralgia of left side 06/01/2023    Restless legs syndrome 12/27/2022    Idiopathic chronic pancreatitis 12/27/2022    Iron deficiency anemia 12/27/2022    Chronic migraine with aura 12/27/2022    Primary osteoarthritis of left hand 12/06/2021    Acquired hypothyroidism 04/21/2021    Osteoporosis 04/21/2021    Vitamin D deficiency 04/21/2021    Mild intermittent asthma without complication 04/21/2021    Chronic sinusitis 01/25/2021    Chronic pain 06/13/2020     Impression: Status post left knee arthroscopy doing well   Plan:  Slow progression of activities outlined.  We discussed favored and deleterious forms of exercise.  Recheck maranda Sosa M.D.                   [1]   Current Outpatient Medications on File Prior to Visit   Medication Sig Dispense Refill    albuterol (VENTOLIN HFA) 90 mcg/actuation inhaler Inhale 2 puffs into the lungs every 6 (six) hours as needed for Wheezing. Rescue 18 g 5    alendronate (FOSAMAX) 70 MG tablet Take 1 tablet (70 mg total) by mouth every 7 days. Take orally once a week on same day of week.  Take first thing in am with a glass of water.  For next 30 minutes, do not eat, drink, take medications or supplements, recline or lie down. 4 tablet 11    baclofen (LIORESAL) 10 MG tablet Take 10 mg by mouth as needed.      cyanocobalamin 1,000 mcg/mL injection Inject 1 mL (1,000 mcg total) into the muscle every 30 days. Include needles and syringes for administration. 10 mL 1    ergocalciferol (VITAMIN D2) 50,000 unit Cap Take 50,000 Units by mouth every 7 days. Once weekly      HYDROcodone-acetaminophen (NORCO) 5-325 mg per tablet Take 0.5-1 tablets by mouth nightly as needed for Pain.      meloxicam (MOBIC) 15 MG tablet Take 1 tablet (15 mg total) by mouth once daily. 30 tablet 3    ondansetron (ZOFRAN) 4 MG tablet Take 1 tablet (4 mg total) by mouth every 6 (six) hours as needed for Nausea. 30 tablet 0    rimegepant (NURTEC) 75 mg odt Take 1 tablet (75 mg total) by mouth once as needed for Migraine. Place ODT tablet on the tongue; alternatively the ODT tablet may be placed under the tongue 16 tablet 2    SYNTHROID 88 mcg tablet Take 1 tablet (88 mcg total) by mouth before breakfast. Brand name only. 90 tablet 3    traMADoL (ULTRAM) 50 mg tablet Take 50 mg by mouth nightly. Take 1/2-1 tablet at bedtime daily prn.       No current facility-administered medications on file prior to visit.

## 2025-04-14 ENCOUNTER — ANESTHESIA (OUTPATIENT)
Dept: GASTROENTEROLOGY | Facility: HOSPITAL | Age: 60
End: 2025-04-14
Payer: COMMERCIAL

## 2025-04-14 ENCOUNTER — HOSPITAL ENCOUNTER (OUTPATIENT)
Dept: GASTROENTEROLOGY | Facility: HOSPITAL | Age: 60
Discharge: HOME | End: 2025-04-14
Admitting: INTERNAL MEDICINE
Payer: COMMERCIAL

## 2025-04-14 ENCOUNTER — ANESTHESIA EVENT (OUTPATIENT)
Dept: GASTROENTEROLOGY | Facility: HOSPITAL | Age: 60
End: 2025-04-14
Payer: COMMERCIAL

## 2025-04-14 VITALS
OXYGEN SATURATION: 100 % | RESPIRATION RATE: 12 BRPM | SYSTOLIC BLOOD PRESSURE: 119 MMHG | TEMPERATURE: 98 F | HEART RATE: 63 BPM | BODY MASS INDEX: 27.38 KG/M2 | WEIGHT: 145 LBS | DIASTOLIC BLOOD PRESSURE: 46 MMHG | HEIGHT: 61 IN

## 2025-04-14 DIAGNOSIS — K57.90 DIVERTICULOSIS: ICD-10-CM

## 2025-04-14 DIAGNOSIS — R10.32 LEFT LOWER QUADRANT PAIN: ICD-10-CM

## 2025-04-14 PROCEDURE — 37000009 HC ANESTHESIA EA ADD 15 MINS

## 2025-04-14 PROCEDURE — 37000008 HC ANESTHESIA 1ST 15 MINUTES

## 2025-04-14 PROCEDURE — 25000003 PHARM REV CODE 250: Performed by: NURSE ANESTHETIST, CERTIFIED REGISTERED

## 2025-04-14 PROCEDURE — 27000284 HC CANNULA NASAL: Performed by: NURSE ANESTHETIST, CERTIFIED REGISTERED

## 2025-04-14 PROCEDURE — 45378 DIAGNOSTIC COLONOSCOPY: CPT | Mod: ,,, | Performed by: INTERNAL MEDICINE

## 2025-04-14 PROCEDURE — 45378 DIAGNOSTIC COLONOSCOPY: CPT | Performed by: INTERNAL MEDICINE

## 2025-04-14 PROCEDURE — D9220A PRA ANESTHESIA: Mod: ,,, | Performed by: NURSE ANESTHETIST, CERTIFIED REGISTERED

## 2025-04-14 PROCEDURE — 63600175 PHARM REV CODE 636 W HCPCS: Performed by: NURSE ANESTHETIST, CERTIFIED REGISTERED

## 2025-04-14 RX ORDER — LIDOCAINE HYDROCHLORIDE 20 MG/ML
INJECTION, SOLUTION EPIDURAL; INFILTRATION; INTRACAUDAL; PERINEURAL
Status: DISCONTINUED | OUTPATIENT
Start: 2025-04-14 | End: 2025-04-14

## 2025-04-14 RX ORDER — SODIUM CHLORIDE, SODIUM LACTATE, POTASSIUM CHLORIDE, CALCIUM CHLORIDE 600; 310; 30; 20 MG/100ML; MG/100ML; MG/100ML; MG/100ML
INJECTION, SOLUTION INTRAVENOUS CONTINUOUS
Status: DISCONTINUED | OUTPATIENT
Start: 2025-04-14 | End: 2025-04-15 | Stop reason: HOSPADM

## 2025-04-14 RX ORDER — PROPOFOL 10 MG/ML
VIAL (ML) INTRAVENOUS
Status: DISCONTINUED | OUTPATIENT
Start: 2025-04-14 | End: 2025-04-14

## 2025-04-14 RX ORDER — SODIUM CHLORIDE 0.9 % (FLUSH) 0.9 %
10 SYRINGE (ML) INJECTION EVERY 6 HOURS PRN
Status: DISCONTINUED | OUTPATIENT
Start: 2025-04-14 | End: 2025-04-15 | Stop reason: HOSPADM

## 2025-04-14 RX ADMIN — LIDOCAINE HYDROCHLORIDE 25 MG: 20 INJECTION, SOLUTION EPIDURAL; INFILTRATION; INTRACAUDAL; PERINEURAL at 02:04

## 2025-04-14 RX ADMIN — PROPOFOL 25 MG: 10 INJECTION, EMULSION INTRAVENOUS at 02:04

## 2025-04-14 RX ADMIN — SODIUM CHLORIDE: 9 INJECTION, SOLUTION INTRAVENOUS at 02:04

## 2025-04-14 NOTE — ANESTHESIA PREPROCEDURE EVALUATION
04/14/2025  Muna Newberry is a 59 y.o., female.      Pre-op Assessment    I have reviewed the Patient Summary Reports.    I have reviewed the NPO Status.   I have reviewed the Medications.     Review of Systems  Anesthesia Hx:  No problems with previous Anesthesia                Social:  Non-Smoker, No Alcohol Use       Hematology/Oncology:    Oncology Normal    -- Anemia:                                  EENT/Dental:  EENT/Dental Normal           Cardiovascular:  Cardiovascular Normal                                              Pulmonary:    Asthma                    Renal/:  Renal/ Normal                 Hepatic/GI:  Hepatic/GI Normal       pancreatitis             Musculoskeletal:  Arthritis               Neurological:    Neuromuscular Disease,  Headaches     Occipital neuralgia      Chronic Pain Syndrome                         Endocrine:   Hypothyroidism          Dermatological:  Skin Normal    Psych:  Psychiatric Normal                    Physical Exam  General: Well nourished, Cooperative and Oriented    Airway:  Mallampati: II / II  Mouth Opening: Normal  TM Distance: > 6 cm  Tongue: Normal  Neck ROM: Normal ROM    Chest/Lungs:  Clear to auscultation, Normal Respiratory Rate    Heart:  Rate: Normal  Rhythm: Regular Rhythm        Anesthesia Plan  Type of Anesthesia, risks & benefits discussed:    Anesthesia Type: Gen Natural Airway, MAC  Intra-op Monitoring Plan: Standard ASA Monitors  Post Op Pain Control Plan: multimodal analgesia  Induction:  IV  Informed Consent: Informed consent signed with the Patient and all parties understand the risks and agree with anesthesia plan.  All questions answered.   ASA Score: 2  Day of Surgery Review of History & Physical: H&P Update referred to the surgeon/provider.I have interviewed and examined the patient. I have reviewed the patient's H&P dated: There  are no significant changes.     Ready For Surgery From Anesthesia Perspective.     .    Past Medical History:   Diagnosis Date    Herpes zoster without complication 12/20/2021    Hyperplastic rectal polyp 12/19/2016    Idiopathic thrombotic thrombocytopenic purpura     Osteoarthrosis     Osteoporosis     Personal history of colonic polyps 12/19/2016    PONV (postoperative nausea and vomiting)       Current Outpatient Medications   Medication Sig    albuterol (VENTOLIN HFA) 90 mcg/actuation inhaler Inhale 2 puffs into the lungs every 6 (six) hours as needed for Wheezing. Rescue    alendronate (FOSAMAX) 70 MG tablet Take 1 tablet (70 mg total) by mouth every 7 days. Take orally once a week on same day of week.  Take first thing in am with a glass of water.  For next 30 minutes, do not eat, drink, take medications or supplements, recline or lie down.    baclofen (LIORESAL) 10 MG tablet Take 10 mg by mouth as needed.    cyanocobalamin 1,000 mcg/mL injection Inject 1 mL (1,000 mcg total) into the muscle every 30 days. Include needles and syringes for administration.    ergocalciferol (VITAMIN D2) 50,000 unit Cap Take 50,000 Units by mouth every 7 days. Once weekly    HYDROcodone-acetaminophen (NORCO) 5-325 mg per tablet Take 0.5-1 tablets by mouth nightly as needed for Pain.    meloxicam (MOBIC) 15 MG tablet Take 1 tablet (15 mg total) by mouth once daily.    ondansetron (ZOFRAN) 4 MG tablet Take 1 tablet (4 mg total) by mouth every 6 (six) hours as needed for Nausea.    rimegepant (NURTEC) 75 mg odt Take 1 tablet (75 mg total) by mouth once as needed for Migraine. Place ODT tablet on the tongue; alternatively the ODT tablet may be placed under the tongue    SYNTHROID 88 mcg tablet Take 1 tablet (88 mcg total) by mouth before breakfast. Brand name only.    traMADoL (ULTRAM) 50 mg tablet Take 50 mg by mouth nightly. Take 1/2-1 tablet at bedtime daily prn.     No current facility-administered medications for this  encounter.

## 2025-04-14 NOTE — H&P
History & Physical - Short Stay  Gastroenterology      SUBJECTIVE:     Procedure: Colonoscopy    Chief Complaint/Indication for Procedure: Abdominal Pain, LLQ abd pain    (Not in a hospital admission)      Review of patient's allergies indicates:   Allergen Reactions    Phenergan [promethazine]      IV phenergan only    Sulfa (sulfonamide antibiotics) Blisters    Opioids - morphine analogues Rash    Penicillins Rash        Past Medical History:   Diagnosis Date    Herpes zoster without complication 12/20/2021    Hyperplastic rectal polyp 12/19/2016    Idiopathic thrombotic thrombocytopenic purpura     Osteoarthrosis     Osteoporosis     Personal history of colonic polyps 12/19/2016    PONV (postoperative nausea and vomiting)      Past Surgical History:   Procedure Laterality Date    APPENDECTOMY      ARTHROSCOPIC DEBRIDEMENT OF SHOULDER  3/12/2024    Procedure: DEBRIDEMENT, SHOULDER, ARTHROSCOPIC;  Surgeon: Salomon Sosa MD;  Location: [facility]THO OR;  Service: Orthopedics;;    ARTHROSCOPY OF KNEE Left 3/6/2025    Procedure: ARTHROSCOPY, KNEE;  Surgeon: Salomon Sosa MD;  Location: [facility] OR;  Service: Orthopedics;  Laterality: Left;  INCISION OF ACL CYST    ARTHROSCOPY OF SHOULDER WITH DECOMPRESSION OF SUBACROMIAL SPACE Left 3/12/2024    Procedure: ARTHROSCOPY, SHOULDER, WITH SUBACROMIAL SPACE DECOMPRESSION;  Surgeon: Salomon Sosa MD;  Location: [facility] OR;  Service: Orthopedics;  Laterality: Left;    ARTHROSCOPY, KNEE, WITH MEDIAL OR LATERAL MENISCECTOMY Left 3/6/2025    Procedure: ARTHROSCOPY, KNEE, WITH MEDIAL OR LATERAL MENISCECTOMY;  Surgeon: Salomon Sosa MD;  Location: [facility] OR;  Service: Orthopedics;  Laterality: Left;  PARTIAL LATERAL MENISECTOMY    BREAST BIOPSY      CHOLECYSTECTOMY      COLONOSCOPY  12/19/2016    Dr. Ed [facility] - [facility]    HYSTERECTOMY      OOPHORECTOMY      SHOULDER ARTHROSCOPY Left 3/12/2024    Procedure:  ARTHROSCOPY, SHOULDER;  Surgeon: Salomon Sosa MD;  Location: [facility];  Service: Orthopedics;  Laterality: Left;    SPINAL CORD STIMULATOR IMPLANT      TONSILLECTOMY       Family History   Problem Relation Name Age of Onset    Heart disease Mother      Leukemia Father      Breast cancer Paternal Aunt       Social History[1]      OBJECTIVE:     Vital Signs (Most Recent)  Temp: 97.7 °F (36.5 °C) (04/14/25 1416)  Pulse: 67 (04/14/25 1416)  Resp: 18 (04/14/25 1416)  BP: (!) 122/39 (04/14/25 1416)  SpO2: 99 % (04/14/25 1416)    Physical Exam:                                                       General appearance: alert, cooperative, no distress, comfortable appearing  HENT: Normocephalic, atraumatic, neck symmetrical  Eyes: conjunctivae clear  Lungs: No labored breathing  Abd: Soft, non-tender    ASSESSMENT/PLAN:     Assessment: Abdominal Pain, LLQ abd pain    Plan: Colonoscopy         [1]   Social History  Tobacco Use    Smoking status: Never    Smokeless tobacco: Never   Substance Use Topics    Alcohol use: Never    Drug use: Yes     Types: Hydrocodone

## 2025-04-14 NOTE — TRANSFER OF CARE
"Anesthesia Transfer of Care Note    Patient: Muna Newberry    Procedure(s) Performed: Colonoscopy    Patient location: PACU    Transport from OR: Transported from OR on room air with adequate spontaneous ventilation. Continuous ECG monitoring in transport. Continuous SpO2 monitoring in transport    Post pain: adequate analgesia    Post assessment: no apparent anesthetic complications    Post vital signs: stable    Level of consciousness: sedated    Nausea/Vomiting: no nausea/vomiting    Complications: none    Transfer of care protocol was followed      Last vitals: Visit Vitals  BP (!) 109/58 (Patient Position: Lying)   Pulse 70   Temp 36.7 °C (98 °F) (Skin)   Resp 13   Ht 5' 1" (1.549 m)   Wt 65.8 kg (145 lb)   SpO2 99%   Breastfeeding No   BMI 27.40 kg/m²     "

## 2025-04-14 NOTE — ANESTHESIA POSTPROCEDURE EVALUATION
Anesthesia Post Evaluation    Patient: Muna Newberry    Procedure(s) Performed: Colonoscopy    Final Anesthesia Type: MAC      Patient location during evaluation: GI PACU  Patient participation: Yes- Able to Participate  Level of consciousness: awake and alert  Post-procedure vital signs: reviewed and stable  Pain management: adequate  Airway patency: patent    PONV status at discharge: No PONV  Anesthetic complications: no      Cardiovascular status: blood pressure returned to baseline and hemodynamically stable  Respiratory status: spontaneous ventilation, unassisted and room air  Hydration status: euvolemic  Follow-up not needed.              Vitals Value Taken Time   /46 04/14/25 15:29   Temp 36.7 °C (98 °F) 04/14/25 14:59   Pulse 64 04/14/25 15:30   Resp 10 04/14/25 15:30   SpO2 100 % 04/14/25 15:30   Vitals shown include unfiled device data.      Event Time   Out of Recovery 15:42:55         Pain/Pérez Score: Pérez Score: 10 (4/14/2025  3:25 PM)

## 2025-04-14 NOTE — DISCHARGE INSTRUCTIONS
Procedure Date  4/14/25     Impression  Overall Impression:   Diverticulosis in the sigmoid colon  Hemorrhoids     Recommendation  Repeat screening colonoscopy in 10 years         - Discharge patient to home  - Advance diet as tolerated  - Continue present medications  - Patient has a contact number available for emergencies. The signs and symptoms of potential delayed complications were discussed with the patient. Return to normal activities tomorrow. Written discharge instructions were provided to the patient.       THE NURSE WILL CALL YOU WITH YOUR BIOPSY RESULTS IN A FEW DAYS. IF YOU HAVE  [facility] MYCHART YOUR RESULTS WILL APPEAR THERE AS WELL.NO DRIVING, OPERATING EQUIPMENT, OR SIGNING LEGAL DOCUMENTS FOR 24 HOURS.Please call the GI Lab if you have any nausea, vomiting, or abdominal pain.

## 2025-04-16 ENCOUNTER — CLINICAL SUPPORT (OUTPATIENT)
Dept: AUDIOLOGY | Facility: CLINIC | Age: 60
End: 2025-04-16
Payer: COMMERCIAL

## 2025-04-16 DIAGNOSIS — H90.3 SENSORY HEARING LOSS, BILATERAL: Primary | ICD-10-CM

## 2025-05-05 ENCOUNTER — CLINICAL SUPPORT (OUTPATIENT)
Dept: AUDIOLOGY | Facility: CLINIC | Age: 60
End: 2025-05-05
Payer: COMMERCIAL

## 2025-05-05 ENCOUNTER — OFFICE VISIT (OUTPATIENT)
Dept: OTOLARYNGOLOGY | Facility: CLINIC | Age: 60
End: 2025-05-05
Payer: COMMERCIAL

## 2025-05-05 VITALS — BODY MASS INDEX: 27.38 KG/M2 | HEIGHT: 61 IN | WEIGHT: 145 LBS

## 2025-05-05 DIAGNOSIS — H90.3 SENSORY HEARING LOSS, BILATERAL: Primary | ICD-10-CM

## 2025-05-05 DIAGNOSIS — H90.3 SENSORINEURAL HEARING LOSS (SNHL) OF BOTH EARS: ICD-10-CM

## 2025-05-05 DIAGNOSIS — H65.22 LEFT CHRONIC SEROUS OTITIS MEDIA: ICD-10-CM

## 2025-05-05 DIAGNOSIS — J32.9 CHRONIC SINUSITIS, UNSPECIFIED LOCATION: Primary | ICD-10-CM

## 2025-05-05 PROCEDURE — 99999 PR PBB SHADOW E&M-EST. PATIENT-LVL III: CPT | Mod: PBBFAC,,, | Performed by: OTOLARYNGOLOGY

## 2025-05-05 PROCEDURE — 99214 OFFICE O/P EST MOD 30 MIN: CPT | Mod: S$GLB,,, | Performed by: OTOLARYNGOLOGY

## 2025-05-05 PROCEDURE — 1160F RVW MEDS BY RX/DR IN RCRD: CPT | Mod: S$GLB,,, | Performed by: OTOLARYNGOLOGY

## 2025-05-05 PROCEDURE — 1159F MED LIST DOCD IN RCRD: CPT | Mod: S$GLB,,, | Performed by: OTOLARYNGOLOGY

## 2025-05-05 PROCEDURE — 3008F BODY MASS INDEX DOCD: CPT | Mod: S$GLB,,, | Performed by: OTOLARYNGOLOGY

## 2025-05-05 RX ORDER — CLINDAMYCIN HYDROCHLORIDE 300 MG/1
300 CAPSULE ORAL 2 TIMES DAILY
Qty: 28 CAPSULE | Refills: 0 | Status: SHIPPED | OUTPATIENT
Start: 2025-05-05

## 2025-05-05 NOTE — PROGRESS NOTES
Subjective:       Patient ID: Muna Newberry is a 59 y.o. female.    Chief Complaint: Ear Fullness (Left ear. Pt states she has trouble hearing out of her left ear. ) and Sinusitis (Sinus congestion/drainage)    Ear Fullness     Sinusitis  Associated symptoms include congestion, ear pain and sinus pressure.     Review of Systems   HENT:  Positive for congestion, ear pain, sinus pressure and sinus pain.    All other systems reviewed and are negative.      Objective:      Physical Exam  General: NAD  Head: Normocephalic, atraumatic, no facial asymmetry/normal strength,  Ears: Both auricules normal in appearance, w/o deformities tympanic membranes left red  external auditory canals normal  Nose: External nose w/o deformities normal turbinates no drainage or inflammation  Oral Cavity: Lips, gums, floor of mouth, tongue hard palate, and buccal mucosa without mass/lesion  Oropharynx: Mucosa pink and moist, soft palate, posterior pharynx and oropharyngeal wall without mass/lesion  Neck: Supple, symmetric, trachea midline, no palpable mass/lesion, no palpable cervical lymphadenopathy  Skin: Warm and dry, no concerning lesions  Respiratory: Respirations even, unlabored    Assessment:       1. Chronic sinusitis, unspecified location    2. Sensorineural hearing loss (SNHL) of both ears    3. Left chronic serous otitis media        Plan:       Cleocin   F/u 2 weeks

## 2025-05-14 ENCOUNTER — PATIENT MESSAGE (OUTPATIENT)
Dept: ORTHOPEDICS | Facility: CLINIC | Age: 60
End: 2025-05-14
Payer: COMMERCIAL

## 2025-06-22 ENCOUNTER — PATIENT MESSAGE (OUTPATIENT)
Dept: ORTHOPEDICS | Facility: CLINIC | Age: 60
End: 2025-06-22
Payer: COMMERCIAL

## 2025-06-25 ENCOUNTER — HOSPITAL ENCOUNTER (OUTPATIENT)
Dept: RADIOLOGY | Facility: HOSPITAL | Age: 60
Discharge: HOME | End: 2025-06-25
Payer: COMMERCIAL

## 2025-06-25 ENCOUNTER — PATIENT MESSAGE (OUTPATIENT)
Dept: ORTHOPEDICS | Facility: CLINIC | Age: 60
End: 2025-06-25
Payer: COMMERCIAL

## 2025-06-25 ENCOUNTER — OFFICE VISIT (OUTPATIENT)
Dept: ORTHOPEDICS | Facility: CLINIC | Age: 60
End: 2025-06-25
Payer: COMMERCIAL

## 2025-06-25 VITALS
OXYGEN SATURATION: 99 % | HEART RATE: 68 BPM | BODY MASS INDEX: 27.39 KG/M2 | DIASTOLIC BLOOD PRESSURE: 46 MMHG | WEIGHT: 145.06 LBS | SYSTOLIC BLOOD PRESSURE: 106 MMHG | HEIGHT: 61 IN

## 2025-06-25 DIAGNOSIS — M25.561 RIGHT KNEE PAIN, UNSPECIFIED CHRONICITY: ICD-10-CM

## 2025-06-25 DIAGNOSIS — M25.561 RIGHT KNEE PAIN, UNSPECIFIED CHRONICITY: Primary | ICD-10-CM

## 2025-06-25 PROCEDURE — 3078F DIAST BP <80 MM HG: CPT | Mod: S$GLB,,,

## 2025-06-25 PROCEDURE — 20610 DRAIN/INJ JOINT/BURSA W/O US: CPT | Mod: RT,S$GLB,,

## 2025-06-25 PROCEDURE — 3008F BODY MASS INDEX DOCD: CPT | Mod: S$GLB,,,

## 2025-06-25 PROCEDURE — 3074F SYST BP LT 130 MM HG: CPT | Mod: S$GLB,,,

## 2025-06-25 PROCEDURE — 73562 X-RAY EXAM OF KNEE 3: CPT | Mod: TC,RT

## 2025-06-25 PROCEDURE — 99213 OFFICE O/P EST LOW 20 MIN: CPT | Mod: 25,S$GLB,,

## 2025-06-25 PROCEDURE — 99999 PR PBB SHADOW E&M-EST. PATIENT-LVL IV: CPT | Mod: PBBFAC,,,

## 2025-06-25 PROCEDURE — 1159F MED LIST DOCD IN RCRD: CPT | Mod: S$GLB,,,

## 2025-06-25 RX ADMIN — BUPIVACAINE HYDROCHLORIDE 1 ML: 2.5 INJECTION, SOLUTION EPIDURAL; INFILTRATION; INTRACAUDAL; PERINEURAL at 03:06

## 2025-06-25 RX ADMIN — TRIAMCINOLONE ACETONIDE 40 MG: 40 INJECTION, SUSPENSION INTRA-ARTICULAR; INTRAMUSCULAR at 03:06

## 2025-06-25 NOTE — PROCEDURES
Large Joint Aspiration/Injection: R knee    Date/Time: 6/25/2025 3:20 PM    Performed by: Suha Hendrix PA  Authorized by: Suha Hendrix PA    Consent Done?:  Yes (Verbal)  Indications:  Arthritis and pain  Site marked: the procedure site was marked      Details:  Needle Size:  22 G  Approach:  Anterolateral  Location:  Knee  Site:  R knee  Medications:  1 mL BUPivacaine (PF) 0.25% (2.5 mg/ml) 0.25 % (2.5 mg/mL); 40 mg triamcinolone acetonide 40 mg/mL  Patient tolerance:  Patient tolerated the procedure well with no immediate complications

## 2025-07-02 RX ORDER — TRIAMCINOLONE ACETONIDE 40 MG/ML
40 INJECTION, SUSPENSION INTRA-ARTICULAR; INTRAMUSCULAR
Status: DISCONTINUED | OUTPATIENT
Start: 2025-06-25 | End: 2025-07-02 | Stop reason: HOSPADM

## 2025-07-02 RX ORDER — BUPIVACAINE HYDROCHLORIDE 2.5 MG/ML
1 INJECTION, SOLUTION EPIDURAL; INFILTRATION; INTRACAUDAL; PERINEURAL
Status: DISCONTINUED | OUTPATIENT
Start: 2025-06-25 | End: 2025-07-02 | Stop reason: HOSPADM

## 2025-07-02 NOTE — PROGRESS NOTES
CC:   Chief Complaint   Patient presents with    Right Knee - Pain, Swelling    Injections     Patient received a right knee steroid injection        Muna Newberry is a 59 y.o. female seen today for follow up of Pain and Swelling of the Right Knee and Injections (Patient received a right knee steroid injection)  Patient previously known to Dr. Sosa for left knee arthroscopy in March of this year presents today with complaints of right knee pain.  She denies any new fall or injury.  She is requesting injection.        PAST MEDICAL HISTORY:   Past Medical History:   Diagnosis Date    Herpes zoster without complication 12/20/2021    Hyperplastic rectal polyp 12/19/2016    Idiopathic thrombotic thrombocytopenic purpura     Osteoarthrosis     Osteoporosis     Personal history of colonic polyps 12/19/2016    PONV (postoperative nausea and vomiting)         PAST SURGICAL HISTORY:   Past Surgical History:   Procedure Laterality Date    APPENDECTOMY      ARTHROSCOPIC DEBRIDEMENT OF SHOULDER  3/12/2024    Procedure: DEBRIDEMENT, SHOULDER, ARTHROSCOPIC;  Surgeon: Salomon Sosa MD;  Location: [facility] OR;  Service: Orthopedics;;    ARTHROSCOPY OF KNEE Left 3/6/2025    Procedure: ARTHROSCOPY, KNEE;  Surgeon: Salomon Sosa MD;  Location: [facility] OR;  Service: Orthopedics;  Laterality: Left;  INCISION OF ACL CYST    ARTHROSCOPY OF SHOULDER WITH DECOMPRESSION OF SUBACROMIAL SPACE Left 3/12/2024    Procedure: ARTHROSCOPY, SHOULDER, WITH SUBACROMIAL SPACE DECOMPRESSION;  Surgeon: Salomon Sosa MD;  Location: [facility] OR;  Service: Orthopedics;  Laterality: Left;    ARTHROSCOPY, KNEE, WITH MEDIAL OR LATERAL MENISCECTOMY Left 3/6/2025    Procedure: ARTHROSCOPY, KNEE, WITH MEDIAL OR LATERAL MENISCECTOMY;  Surgeon: Salomon Sosa MD;  Location: [facility] OR;  Service: Orthopedics;  Laterality: Left;  PARTIAL LATERAL MENISECTOMY    BREAST BIOPSY       CHOLECYSTECTOMY      COLONOSCOPY  12/19/2016    Dr. Ed Adame [facility] - [facility]    HYSTERECTOMY      OOPHORECTOMY      SHOULDER ARTHROSCOPY Left 3/12/2024    Procedure: ARTHROSCOPY, SHOULDER;  Surgeon: Salomon Sosa MD;  Location: [facility];  Service: Orthopedics;  Laterality: Left;    SPINAL CORD STIMULATOR IMPLANT      TONSILLECTOMY          ALLERGIES:   Review of patient's allergies indicates:   Allergen Reactions    Phenergan [promethazine]      IV phenergan only    Sulfa (sulfonamide antibiotics) Blisters    Opioids - morphine analogues Rash    Penicillins Rash        MEDICATIONS :  Current Medications[1]     SOCIAL HISTORY: Social History[2]     FAMILY HISTORY:   Family History   Problem Relation Name Age of Onset    Heart disease Mother      Leukemia Father      Breast cancer Paternal Aunt            PHYSICAL EXAM:      Vitals:    06/25/25 1308   BP: (!) 106/46   Pulse: 68     Body mass index is 27.41 kg/m².    GENERAL: Well-developed, well-nourished female . The patient is alert, oriented and cooperative.    EXTREMITIES:  Right knee tenderness to palpation along medial and lateral joint lines, crepitus noted over patella with movement, range of motion from 0-120 degrees, knee feels stable to varus and valgus stress testing, neurovascularly intact      RADIOGRAPHIC FINDINGS:   X-Ray Knee 3 View Right  Result Date: 7/1/2025  EXAMINATION: XR KNEE 3 VIEW RIGHT CLINICAL HISTORY: Pain in right knee TECHNIQUE: AP, lateral, and Merchant views of the right knee were performed. COMPARISON: 03/06/2023 FINDINGS: The right knee joint space appears normal.  No fracture, no osseous lesions.  No significant osteophyte formation.  No joint effusion.  The soft tissues appear normal.     No significant abnormality seen. Electronically signed by: Farideh Sue MD Date:    07/01/2025 Time:    14:35       Patient Active Problem List    Diagnosis Date Noted    Postop check 04/09/2025    Pain of right  calf 02/12/2025    Acute lateral meniscus tear of left knee 02/03/2025    Left knee pain 07/03/2024    Diverticulitis 04/23/2024    Left lower quadrant pain 04/23/2024    Shoulder impingement 02/19/2024    Traumatic incomplete tear of left rotator cuff 01/24/2024    Left shoulder pain 01/08/2024    Chronic pain of multiple joints 10/02/2023    Primary osteoarthritis involving multiple joints     Cobalamin deficiency     Acute non-recurrent maxillary sinusitis 07/12/2023    Occipital neuralgia of left side 06/01/2023    Restless legs syndrome 12/27/2022    Idiopathic chronic pancreatitis 12/27/2022    Iron deficiency anemia 12/27/2022    Chronic migraine with aura 12/27/2022    Primary osteoarthritis of left hand 12/06/2021    Acquired hypothyroidism 04/21/2021    Osteoporosis 04/21/2021    Vitamin D deficiency 04/21/2021    Mild intermittent asthma without complication 04/21/2021    Chronic sinusitis 01/25/2021    Chronic pain 06/13/2020       IMPRESSION AND PLAN:  Chronic right knee pain.  Personally reviewed x-rays today noting mild degenerative changes of the right knee.  Discussed all treatment options with the patient today.  Patient would like to try an injection.  See procedural note for details.  Discussed with her injection can be repeated in 4 months, otherwise follow up p.r.n..      No follow-ups on file.       Suha Hendrix PA-C      (Subject to voice recognition error, transcription service not allowed)         [1]   Current Outpatient Medications:     albuterol (VENTOLIN HFA) 90 mcg/actuation inhaler, Inhale 2 puffs into the lungs every 6 (six) hours as needed for Wheezing. Rescue, Disp: 18 g, Rfl: 5    alendronate (FOSAMAX) 70 MG tablet, Take 1 tablet (70 mg total) by mouth every 7 days. Take orally once a week on same day of week.  Take first thing in am with a glass of water.  For next 30 minutes, do not eat, drink, take medications or supplements, recline or lie down., Disp: 4 tablet, Rfl: 11     baclofen (LIORESAL) 10 MG tablet, Take 10 mg by mouth as needed., Disp: , Rfl:     clindamycin (CLEOCIN) 300 MG capsule, Take 1 capsule (300 mg total) by mouth 2 (two) times a day., Disp: 28 capsule, Rfl: 0    cyanocobalamin 1,000 mcg/mL injection, Inject 1 mL (1,000 mcg total) into the muscle every 30 days. Include needles and syringes for administration., Disp: 10 mL, Rfl: 1    ergocalciferol (VITAMIN D2) 50,000 unit Cap, Take 50,000 Units by mouth every 7 days. Once weekly, Disp: , Rfl:     HYDROcodone-acetaminophen (NORCO) 5-325 mg per tablet, Take 0.5-1 tablets by mouth nightly as needed for Pain., Disp: , Rfl:     meloxicam (MOBIC) 15 MG tablet, Take 1 tablet (15 mg total) by mouth once daily., Disp: 30 tablet, Rfl: 3    ondansetron (ZOFRAN) 4 MG tablet, Take 1 tablet (4 mg total) by mouth every 6 (six) hours as needed for Nausea., Disp: 30 tablet, Rfl: 0    SYNTHROID 88 mcg tablet, Take 1 tablet (88 mcg total) by mouth before breakfast. Brand name only., Disp: 90 tablet, Rfl: 3    traMADoL (ULTRAM) 50 mg tablet, Take 50 mg by mouth nightly. Take 1/2-1 tablet at bedtime daily prn., Disp: , Rfl:     rimegepant (NURTEC) 75 mg odt, Take 1 tablet (75 mg total) by mouth once as needed for Migraine. Place ODT tablet on the tongue; alternatively the ODT tablet may be placed under the tongue, Disp: 16 tablet, Rfl: 2  [2]   Social History  Socioeconomic History    Marital status:     Number of children: 3   Tobacco Use    Smoking status: Never    Smokeless tobacco: Never   Substance and Sexual Activity    Alcohol use: Never    Drug use: Yes     Types: Hydrocodone    Sexual activity: Not Currently

## 2025-08-10 ENCOUNTER — PATIENT MESSAGE (OUTPATIENT)
Dept: ORTHOPEDICS | Facility: CLINIC | Age: 60
End: 2025-08-10
Payer: COMMERCIAL

## 2025-08-11 DIAGNOSIS — M25.561 RIGHT KNEE PAIN, UNSPECIFIED CHRONICITY: Primary | ICD-10-CM

## 2025-08-15 ENCOUNTER — HOSPITAL ENCOUNTER (OUTPATIENT)
Dept: RADIOLOGY | Facility: HOSPITAL | Age: 60
Discharge: HOME | End: 2025-08-15
Payer: COMMERCIAL

## 2025-08-15 ENCOUNTER — PATIENT MESSAGE (OUTPATIENT)
Dept: ORTHOPEDICS | Facility: CLINIC | Age: 60
End: 2025-08-15
Payer: COMMERCIAL

## 2025-08-15 DIAGNOSIS — M25.561 RIGHT KNEE PAIN, UNSPECIFIED CHRONICITY: ICD-10-CM

## 2025-08-15 PROCEDURE — 73721 MRI JNT OF LWR EXTRE W/O DYE: CPT | Mod: 26,RT,, | Performed by: RADIOLOGY

## 2025-08-15 PROCEDURE — 73721 MRI JNT OF LWR EXTRE W/O DYE: CPT | Mod: TC,RT

## 2025-08-18 ENCOUNTER — RESULTS FOLLOW-UP (OUTPATIENT)
Dept: ORTHOPEDICS | Facility: CLINIC | Age: 60
End: 2025-08-18
Payer: COMMERCIAL

## 2025-08-19 ENCOUNTER — TELEPHONE (OUTPATIENT)
Dept: ORTHOPEDICS | Facility: CLINIC | Age: 60
End: 2025-08-19
Payer: COMMERCIAL

## (undated) DEVICE — SPONGE COTTON TRAY 4X4IN

## (undated) DEVICE — DRAPE INCISE IOBAN 2 13X13IN

## (undated) DEVICE — PAD ABDOMINAL 8X7.5 STERILE

## (undated) DEVICE — CANISTER SUCTION MEDI-VAC 12L

## (undated) DEVICE — SOLIDIFIER BOTTLE 1500CC

## (undated) DEVICE — GLOVE 8 PROTEXIS PI BLUE

## (undated) DEVICE — SOL NACL IRR 3000ML

## (undated) DEVICE — BUR FORMULA BRL 12 FLUTE 5.5MM

## (undated) DEVICE — APPLICATOR CHLORAPREP ORN 26ML

## (undated) DEVICE — SYR 10CC LUER LOCK

## (undated) DEVICE — TUBING CROSSFLOW INFLOW CASS

## (undated) DEVICE — DEVICE SUCTION H20 BROOM 12FT

## (undated) DEVICE — Device

## (undated) DEVICE — GLOVE SENSICARE PI SURG 7.5

## (undated) DEVICE — PROBE AARDVARK SUC 0.5X135MM

## (undated) DEVICE — TOURNIQUET SB QC SP 34X4IN

## (undated) DEVICE — KIT SHLDR POMIERSKIWATSON RUSH

## (undated) DEVICE — PROBE SERFAS ENERGY 90S CRSE

## (undated) DEVICE — SHAVER TOMCAT 4.0

## (undated) DEVICE — GOWN POLY REINF BRTH SLV XL

## (undated) DEVICE — GLOVE BIOGEL SKINSENSE PI 7.5

## (undated) DEVICE — BANDAGE PRCAR COMPR 11YDX6IN

## (undated) DEVICE — SLING ARM LARGE FOAM STRAP

## (undated) DEVICE — PACK KNEE ARTHROSCOPY  RUSH

## (undated) DEVICE — GLOVE SENSICARE PI ALOE 6